# Patient Record
Sex: MALE | Race: WHITE | Employment: OTHER | URBAN - METROPOLITAN AREA
[De-identification: names, ages, dates, MRNs, and addresses within clinical notes are randomized per-mention and may not be internally consistent; named-entity substitution may affect disease eponyms.]

---

## 2017-10-29 ENCOUNTER — APPOINTMENT (OUTPATIENT)
Dept: CT IMAGING | Age: 47
DRG: 683 | End: 2017-10-29
Payer: COMMERCIAL

## 2017-10-29 ENCOUNTER — APPOINTMENT (OUTPATIENT)
Dept: GENERAL RADIOLOGY | Age: 47
DRG: 683 | End: 2017-10-29
Payer: COMMERCIAL

## 2017-10-29 ENCOUNTER — HOSPITAL ENCOUNTER (INPATIENT)
Age: 47
LOS: 5 days | Discharge: HOME | DRG: 683 | End: 2017-11-03
Attending: EMERGENCY MEDICINE | Admitting: INTERNAL MEDICINE
Payer: COMMERCIAL

## 2017-10-29 DIAGNOSIS — D50.9 IRON DEFICIENCY ANEMIA, UNSPECIFIED IRON DEFICIENCY ANEMIA TYPE: ICD-10-CM

## 2017-10-29 DIAGNOSIS — I10 ACCELERATED HYPERTENSION: Primary | ICD-10-CM

## 2017-10-29 DIAGNOSIS — N17.9 ACUTE RENAL FAILURE, UNSPECIFIED ACUTE RENAL FAILURE TYPE (HCC): ICD-10-CM

## 2017-10-29 PROBLEM — E66.9 OBESITY (BMI 35.0-39.9 WITHOUT COMORBIDITY): Status: ACTIVE | Noted: 2017-10-29

## 2017-10-29 PROBLEM — R73.9 HYPERGLYCEMIA: Status: ACTIVE | Noted: 2017-10-29

## 2017-10-29 PROBLEM — E87.6 HYPOKALEMIA: Status: ACTIVE | Noted: 2017-10-29

## 2017-10-29 PROBLEM — I16.0 HYPERTENSIVE URGENCY: Status: ACTIVE | Noted: 2017-10-29

## 2017-10-29 PROBLEM — G47.33 OSA (OBSTRUCTIVE SLEEP APNEA): Status: ACTIVE | Noted: 2017-10-29

## 2017-10-29 LAB
-: ABNORMAL
ABSOLUTE EOS #: 0.2 K/UL (ref 0–0.4)
ABSOLUTE IMMATURE GRANULOCYTE: ABNORMAL K/UL (ref 0–0.3)
ABSOLUTE LYMPH #: 1.5 K/UL (ref 1–4.8)
ABSOLUTE MONO #: 0.8 K/UL (ref 0.2–0.8)
AMORPHOUS: ABNORMAL
ANION GAP SERPL CALCULATED.3IONS-SCNC: 13 MMOL/L (ref 9–17)
ANION GAP SERPL CALCULATED.3IONS-SCNC: 15 MMOL/L (ref 9–17)
BACTERIA: ABNORMAL
BASOPHILS # BLD: 0 %
BASOPHILS ABSOLUTE: 0 K/UL (ref 0–0.2)
BILIRUBIN URINE: NEGATIVE
BUN BLDV-MCNC: 34 MG/DL (ref 6–20)
BUN BLDV-MCNC: 39 MG/DL (ref 6–20)
BUN/CREAT BLD: 18 (ref 9–20)
BUN/CREAT BLD: 19 (ref 9–20)
CALCIUM SERPL-MCNC: 8.3 MG/DL (ref 8.6–10.4)
CALCIUM SERPL-MCNC: 8.3 MG/DL (ref 8.6–10.4)
CASTS UA: ABNORMAL /LPF
CHLORIDE BLD-SCNC: 96 MMOL/L (ref 98–107)
CHLORIDE BLD-SCNC: 98 MMOL/L (ref 98–107)
CO2: 26 MMOL/L (ref 20–31)
CO2: 27 MMOL/L (ref 20–31)
COLOR: YELLOW
COMMENT UA: ABNORMAL
CREAT SERPL-MCNC: 1.87 MG/DL (ref 0.7–1.2)
CREAT SERPL-MCNC: 2.07 MG/DL (ref 0.7–1.2)
CRYSTALS, UA: ABNORMAL /HPF
D-DIMER QUANTITATIVE: 0.69 MG/L FEU
DIFFERENTIAL TYPE: ABNORMAL
EOSINOPHILS RELATIVE PERCENT: 2 %
EPITHELIAL CELLS UA: ABNORMAL /HPF
GFR AFRICAN AMERICAN: 42 ML/MIN
GFR AFRICAN AMERICAN: 48 ML/MIN
GFR NON-AFRICAN AMERICAN: 35 ML/MIN
GFR NON-AFRICAN AMERICAN: 39 ML/MIN
GFR SERPL CREATININE-BSD FRML MDRD: ABNORMAL ML/MIN/{1.73_M2}
GLUCOSE BLD-MCNC: 138 MG/DL (ref 70–99)
GLUCOSE BLD-MCNC: 152 MG/DL (ref 70–99)
GLUCOSE BLD-MCNC: 169 MG/DL (ref 75–110)
GLUCOSE URINE: NEGATIVE
HCT VFR BLD CALC: 31.5 % (ref 41–53)
HEMOGLOBIN: 10.2 G/DL (ref 13.5–17.5)
IMMATURE GRANULOCYTES: ABNORMAL %
KETONES, URINE: NEGATIVE
LEUKOCYTE ESTERASE, URINE: NEGATIVE
LYMPHOCYTES # BLD: 12 %
MCH RBC QN AUTO: 27.4 PG (ref 26–34)
MCHC RBC AUTO-ENTMCNC: 32.5 G/DL (ref 31–37)
MCV RBC AUTO: 84.3 FL (ref 80–100)
MONOCYTES # BLD: 6 %
MUCUS: ABNORMAL
MYOGLOBIN: 239 NG/ML (ref 28–72)
NITRITE, URINE: NEGATIVE
OSMOLALITY URINE: 487 MOSM/KG (ref 300–1170)
OTHER OBSERVATIONS UA: ABNORMAL
PDW BLD-RTO: 17.2 % (ref 11.5–14.5)
PH UA: 5.5 (ref 5–8)
PLATELET # BLD: 236 K/UL (ref 130–400)
PLATELET ESTIMATE: ABNORMAL
PMV BLD AUTO: 7.8 FL (ref 6–12)
POTASSIUM SERPL-SCNC: 2.7 MMOL/L (ref 3.7–5.3)
POTASSIUM SERPL-SCNC: 2.9 MMOL/L (ref 3.7–5.3)
PROTEIN UA: ABNORMAL
RBC # BLD: 3.73 M/UL (ref 4.5–5.9)
RBC # BLD: ABNORMAL 10*6/UL
RBC UA: ABNORMAL /HPF (ref 0–2)
RENAL EPITHELIAL, UA: ABNORMAL /HPF
SEG NEUTROPHILS: 80 %
SEGMENTED NEUTROPHILS ABSOLUTE COUNT: 10.6 K/UL (ref 1.8–7.7)
SODIUM BLD-SCNC: 137 MMOL/L (ref 135–144)
SODIUM BLD-SCNC: 138 MMOL/L (ref 135–144)
SPECIFIC GRAVITY UA: 1.02 (ref 1–1.03)
TRICHOMONAS: ABNORMAL
TROPONIN INTERP: ABNORMAL
TROPONIN T: 0.06 NG/ML
TURBIDITY: CLEAR
URINE HGB: ABNORMAL
UROBILINOGEN, URINE: NORMAL
WBC # BLD: 13.2 K/UL (ref 3.5–11)
WBC # BLD: ABNORMAL 10*3/UL
WBC UA: ABNORMAL /HPF (ref 0–5)
YEAST: ABNORMAL

## 2017-10-29 PROCEDURE — 83935 ASSAY OF URINE OSMOLALITY: CPT

## 2017-10-29 PROCEDURE — 86335 IMMUNFIX E-PHORSIS/URINE/CSF: CPT

## 2017-10-29 PROCEDURE — 84300 ASSAY OF URINE SODIUM: CPT

## 2017-10-29 PROCEDURE — 83036 HEMOGLOBIN GLYCOSYLATED A1C: CPT

## 2017-10-29 PROCEDURE — 6360000002 HC RX W HCPCS: Performed by: EMERGENCY MEDICINE

## 2017-10-29 PROCEDURE — 84156 ASSAY OF PROTEIN URINE: CPT

## 2017-10-29 PROCEDURE — 82088 ASSAY OF ALDOSTERONE: CPT

## 2017-10-29 PROCEDURE — 36415 COLL VENOUS BLD VENIPUNCTURE: CPT

## 2017-10-29 PROCEDURE — 80048 BASIC METABOLIC PNL TOTAL CA: CPT

## 2017-10-29 PROCEDURE — 84244 ASSAY OF RENIN: CPT

## 2017-10-29 PROCEDURE — 6370000000 HC RX 637 (ALT 250 FOR IP): Performed by: EMERGENCY MEDICINE

## 2017-10-29 PROCEDURE — 99285 EMERGENCY DEPT VISIT HI MDM: CPT

## 2017-10-29 PROCEDURE — 6370000000 HC RX 637 (ALT 250 FOR IP): Performed by: INTERNAL MEDICINE

## 2017-10-29 PROCEDURE — 70450 CT HEAD/BRAIN W/O DYE: CPT

## 2017-10-29 PROCEDURE — 87205 SMEAR GRAM STAIN: CPT

## 2017-10-29 PROCEDURE — 82947 ASSAY GLUCOSE BLOOD QUANT: CPT

## 2017-10-29 PROCEDURE — 84166 PROTEIN E-PHORESIS/URINE/CSF: CPT

## 2017-10-29 PROCEDURE — 2500000003 HC RX 250 WO HCPCS: Performed by: EMERGENCY MEDICINE

## 2017-10-29 PROCEDURE — 6360000002 HC RX W HCPCS: Performed by: INTERNAL MEDICINE

## 2017-10-29 PROCEDURE — 6370000000 HC RX 637 (ALT 250 FOR IP): Performed by: SPECIALIST

## 2017-10-29 PROCEDURE — 96374 THER/PROPH/DIAG INJ IV PUSH: CPT

## 2017-10-29 PROCEDURE — 96375 TX/PRO/DX INJ NEW DRUG ADDON: CPT

## 2017-10-29 PROCEDURE — 83874 ASSAY OF MYOGLOBIN: CPT

## 2017-10-29 PROCEDURE — 84484 ASSAY OF TROPONIN QUANT: CPT

## 2017-10-29 PROCEDURE — 85025 COMPLETE CBC W/AUTO DIFF WBC: CPT

## 2017-10-29 PROCEDURE — 81001 URINALYSIS AUTO W/SCOPE: CPT

## 2017-10-29 PROCEDURE — 85379 FIBRIN DEGRADATION QUANT: CPT

## 2017-10-29 PROCEDURE — 93005 ELECTROCARDIOGRAM TRACING: CPT

## 2017-10-29 PROCEDURE — 82570 ASSAY OF URINE CREATININE: CPT

## 2017-10-29 PROCEDURE — 2000000000 HC ICU R&B

## 2017-10-29 PROCEDURE — 2580000003 HC RX 258: Performed by: INTERNAL MEDICINE

## 2017-10-29 PROCEDURE — 71020 XR CHEST STANDARD TWO VW: CPT

## 2017-10-29 RX ORDER — LABETALOL HYDROCHLORIDE 5 MG/ML
10 INJECTION, SOLUTION INTRAVENOUS ONCE
Status: COMPLETED | OUTPATIENT
Start: 2017-10-29 | End: 2017-10-29

## 2017-10-29 RX ORDER — AMLODIPINE BESYLATE 5 MG/1
5 TABLET ORAL DAILY
Status: DISCONTINUED | OUTPATIENT
Start: 2017-10-29 | End: 2017-10-30

## 2017-10-29 RX ORDER — POTASSIUM BICARBONATE 25 MEQ/1
50 TABLET, EFFERVESCENT ORAL ONCE
Status: COMPLETED | OUTPATIENT
Start: 2017-10-29 | End: 2017-10-29

## 2017-10-29 RX ORDER — ACETAMINOPHEN 325 MG/1
650 TABLET ORAL EVERY 4 HOURS PRN
Status: DISCONTINUED | OUTPATIENT
Start: 2017-10-29 | End: 2017-11-03 | Stop reason: HOSPADM

## 2017-10-29 RX ORDER — HYDRALAZINE HYDROCHLORIDE 20 MG/ML
10 INJECTION INTRAMUSCULAR; INTRAVENOUS ONCE
Status: COMPLETED | OUTPATIENT
Start: 2017-10-29 | End: 2017-10-29

## 2017-10-29 RX ORDER — LIDOCAINE HYDROCHLORIDE AND EPINEPHRINE 10; 10 MG/ML; UG/ML
20 INJECTION, SOLUTION INFILTRATION; PERINEURAL ONCE
Status: COMPLETED | OUTPATIENT
Start: 2017-10-29 | End: 2017-10-29

## 2017-10-29 RX ORDER — SODIUM CHLORIDE 0.9 % (FLUSH) 0.9 %
10 SYRINGE (ML) INJECTION PRN
Status: DISCONTINUED | OUTPATIENT
Start: 2017-10-29 | End: 2017-11-03 | Stop reason: HOSPADM

## 2017-10-29 RX ORDER — SODIUM CHLORIDE 0.9 % (FLUSH) 0.9 %
10 SYRINGE (ML) INJECTION EVERY 12 HOURS SCHEDULED
Status: DISCONTINUED | OUTPATIENT
Start: 2017-10-29 | End: 2017-11-03 | Stop reason: HOSPADM

## 2017-10-29 RX ORDER — HEPARIN SODIUM 5000 [USP'U]/ML
5000 INJECTION, SOLUTION INTRAVENOUS; SUBCUTANEOUS EVERY 8 HOURS SCHEDULED
Status: DISCONTINUED | OUTPATIENT
Start: 2017-10-30 | End: 2017-11-03 | Stop reason: HOSPADM

## 2017-10-29 RX ORDER — MORPHINE SULFATE 2 MG/ML
1 INJECTION, SOLUTION INTRAMUSCULAR; INTRAVENOUS EVERY 4 HOURS PRN
Status: DISCONTINUED | OUTPATIENT
Start: 2017-10-29 | End: 2017-11-02

## 2017-10-29 RX ORDER — DEXTROSE MONOHYDRATE 50 MG/ML
100 INJECTION, SOLUTION INTRAVENOUS PRN
Status: DISCONTINUED | OUTPATIENT
Start: 2017-10-29 | End: 2017-11-03 | Stop reason: HOSPADM

## 2017-10-29 RX ORDER — POTASSIUM CHLORIDE 20 MEQ/1
40 TABLET, EXTENDED RELEASE ORAL ONCE
Status: COMPLETED | OUTPATIENT
Start: 2017-10-29 | End: 2017-10-29

## 2017-10-29 RX ORDER — DEXTROSE MONOHYDRATE 25 G/50ML
12.5 INJECTION, SOLUTION INTRAVENOUS PRN
Status: DISCONTINUED | OUTPATIENT
Start: 2017-10-29 | End: 2017-11-03 | Stop reason: HOSPADM

## 2017-10-29 RX ORDER — NICOTINE POLACRILEX 4 MG
15 LOZENGE BUCCAL PRN
Status: DISCONTINUED | OUTPATIENT
Start: 2017-10-29 | End: 2017-11-03 | Stop reason: HOSPADM

## 2017-10-29 RX ORDER — ONDANSETRON 2 MG/ML
4 INJECTION INTRAMUSCULAR; INTRAVENOUS EVERY 6 HOURS PRN
Status: DISCONTINUED | OUTPATIENT
Start: 2017-10-29 | End: 2017-11-03 | Stop reason: HOSPADM

## 2017-10-29 RX ORDER — SPIRONOLACTONE 25 MG/1
50 TABLET ORAL ONCE
Status: COMPLETED | OUTPATIENT
Start: 2017-10-29 | End: 2017-10-29

## 2017-10-29 RX ORDER — SODIUM CHLORIDE 450 MG/100ML
INJECTION, SOLUTION INTRAVENOUS CONTINUOUS
Status: DISCONTINUED | OUTPATIENT
Start: 2017-10-29 | End: 2017-10-29

## 2017-10-29 RX ADMIN — Medication: at 11:21

## 2017-10-29 RX ADMIN — SODIUM CHLORIDE: 4.5 INJECTION, SOLUTION INTRAVENOUS at 16:02

## 2017-10-29 RX ADMIN — LABETALOL HYDROCHLORIDE 10 MG: 5 INJECTION, SOLUTION INTRAVENOUS at 11:13

## 2017-10-29 RX ADMIN — MORPHINE SULFATE 1 MG: 2 INJECTION, SOLUTION INTRAMUSCULAR; INTRAVENOUS at 22:24

## 2017-10-29 RX ADMIN — HEPARIN SODIUM 5000 UNITS: 5000 INJECTION, SOLUTION INTRAVENOUS; SUBCUTANEOUS at 23:58

## 2017-10-29 RX ADMIN — NICARDIPINE HYDROCHLORIDE 5 MG/HR: 0.2 INJECTION, SOLUTION INTRAVENOUS at 13:51

## 2017-10-29 RX ADMIN — ACETAMINOPHEN 650 MG: 325 TABLET ORAL at 21:21

## 2017-10-29 RX ADMIN — AMLODIPINE BESYLATE 5 MG: 5 TABLET ORAL at 19:42

## 2017-10-29 RX ADMIN — SPIRONOLACTONE 50 MG: 25 TABLET, FILM COATED ORAL at 16:02

## 2017-10-29 RX ADMIN — METOPROLOL TARTRATE 50 MG: 25 TABLET ORAL at 12:11

## 2017-10-29 RX ADMIN — POTASSIUM BICARBONATE 50 MEQ: 25 TABLET, EFFERVESCENT ORAL at 12:12

## 2017-10-29 RX ADMIN — INSULIN LISPRO 1 UNITS: 100 INJECTION, SOLUTION INTRAVENOUS; SUBCUTANEOUS at 21:08

## 2017-10-29 RX ADMIN — LIDOCAINE HYDROCHLORIDE,EPINEPHRINE BITARTRATE 20 ML: 10; .01 INJECTION, SOLUTION INFILTRATION; PERINEURAL at 11:13

## 2017-10-29 RX ADMIN — NICARDIPINE HYDROCHLORIDE 10 MG/HR: 0.2 INJECTION, SOLUTION INTRAVENOUS at 23:58

## 2017-10-29 RX ADMIN — HYDRALAZINE HYDROCHLORIDE 10 MG: 20 INJECTION INTRAMUSCULAR; INTRAVENOUS at 11:12

## 2017-10-29 RX ADMIN — POTASSIUM CHLORIDE 40 MEQ: 20 TABLET, EXTENDED RELEASE ORAL at 19:42

## 2017-10-29 RX ADMIN — POTASSIUM CHLORIDE 40 MEQ: 20 TABLET, EXTENDED RELEASE ORAL at 16:02

## 2017-10-29 RX ADMIN — NICARDIPINE HYDROCHLORIDE 5 MG/HR: 0.2 INJECTION, SOLUTION INTRAVENOUS at 19:42

## 2017-10-29 ASSESSMENT — PAIN SCALES - GENERAL
PAINLEVEL_OUTOF10: 0
PAINLEVEL_OUTOF10: 3
PAINLEVEL_OUTOF10: 0
PAINLEVEL_OUTOF10: 0

## 2017-10-29 NOTE — ED NOTES
Pt presents to er via ems with c/o bleeding to left lower leg. Pt states he was picking at a sore at 0100 this morning. Pt states he has been bleeding since. Pt was seen at urgent care earlier this morning. Pt arrives to ed with pressure dressing applied. Pt states he is non-compliant with medications. Pt has been off of blood pressure meds for last 2-3 years. Pt states he gets headaches often. Pt a&ox3. Skin warm and dry. Respirations even and non-labored.       Alrine Merrill RN  10/29/17 1190

## 2017-10-29 NOTE — CONSULTS
Admission medications    Not on File       Current Medications:   Scheduled Meds:   sodium chloride flush  10 mL Intravenous 2 times per day     Continuous Infusions:   niCARdipine in NaCl 5 mg/hr (10/29/17 1530)    sodium chloride 100 mL/hr at 10/29/17 1602     PRN Meds:sodium chloride flush, acetaminophen    Allergies:   No Known Allergies    Social History:     Social History     Social History    Marital status:      Spouse name: N/A    Number of children: N/A    Years of education: N/A     Occupational History    Not on file. Social History Main Topics    Smoking status: Never Smoker    Smokeless tobacco: Never Used    Alcohol use Yes    Drug use: No    Sexual activity: Not on file     Other Topics Concern    Not on file     Social History Narrative    No narrative on file       Family History:   History reviewed. No pertinent family history. Physical Exam:     Vitals:    10/29/17 1715 10/29/17 1730 10/29/17 1800 10/29/17 1815   BP: (!) 149/95 (!) 157/78 (!) 175/76 (!) 147/80   Pulse: 82 80 81 81   Resp: 17 14 17 19   Temp:       TempSrc:       SpO2: 96% 96% 96% 98%   Weight:       Height:         No intake/output data recorded. Patient Vitals for the past 96 hrs (Last 3 readings):   Weight   10/29/17 1106 300 lb (136.1 kg)       PHYSICAL EXAM:  General:  Awake, alert, not in distress. Appears to be stated age. HEENT: Atraumatic, normocephalic. Anicteric sclera. Pink and moist oral mucosa. Neck supple. No carotid bruit. No JVD. Chest: Bilateral air entry, clear to auscultation, no wheezing, rhonchi or rales. Cardiovascular: RRR, S1S2, no murmur, rub or gallop. No lower extremity edema. Abdomen: Soft, non tender to palpation. Active bowel sounds x 4 quadrants. Musculoskeletal: Active ROM x 4 extremities. No cyanosis or clubbing. Integumentary: Pink, warm and dry. Free from rash or lesions. Skin turgor normal. BL vericose vains noted.   CNS: Oriented to person, place and time. Speech clear. Face symmetrical. No tremor. Has trouble recalling events    Data:     CBC:   Recent Labs      10/29/17   1115   WBC  13.2*   HGB  10.2*   HCT  31.5*   MCV  84.3   PLT  236     BMP:  Recent Labs      10/29/17   1115   NA  137   K  2.7*   CL  96*   CO2  26   BUN  39*   CREATININE  2.07*   GLUCOSE  138*         Assessment:     1. Acute Kidney Injury likely hemodynamic vs CKD from Chronic uncontrolled HTN  2. Hypokalemia  3. Hyperaldosteronism? 4. Obesity    Plan:     1. Check serum jeevan and Plasma renin activity  2. We will check Renal Ultrasound to r/o element of obstruction and to assess the kidney size/echotexture. 3. Comprehensive urine testing including Urinalysis, Urine sodium, Creatinine (FENa) and Eosinophils. Urine protein and creatinine to quantify the proteinuria if any at all. 4. Start Norvasc and aldactone po. K supplements. Monitor GFR, elytes. 5. We will order serum and urine protein Immunofixation to r/o element of occult paraprotein disease. 6 .Avoid nephrotoxic drugs and IV contrast exposure. 7. Wean Cardene as tolerated  See orders    Thank you for the consultation. Please do not hesitate to contact us for any further questions/concerns. We will continue to follow along with you.      Electronically signed by Alison Daniels MD on 10/29/2017 at 6:28 PM

## 2017-10-29 NOTE — ED NOTES
Bed: 25  Expected date: 10/29/17  Expected time: 10:48 AM  Means of arrival: [facility]  Comments:  [facility]     Bre Ley  10/29/17 110

## 2017-10-29 NOTE — ED PROVIDER NOTES
reviewed. No pertinent family history. SOCIAL HISTORY       Social History     Social History    Marital status:      Spouse name: N/A    Number of children: N/A    Years of education: N/A     Social History Main Topics    Smoking status: Never Smoker    Smokeless tobacco: Never Used    Alcohol use Yes    Drug use: No    Sexual activity: Not Asked     Other Topics Concern    None     Social History Narrative    None       SCREENINGS             PHYSICAL EXAM    (up to 7 for level 4, 8 or more for level 5)     ED Triage Vitals [10/29/17 1106]   BP Temp Temp Source Pulse Resp SpO2 Height Weight   (!) 238/138 98.3 °F (36.8 °C) Oral 85 20 96 % 6' 1\" (1.854 m) 300 lb (136.1 kg)       Physical Exam   Constitutional:   Morbidly obese in appearance, nondistressed. HENT:   Head: Normocephalic. Right Ear: External ear normal.   Left Ear: External ear normal.   Nose: Nose normal.   Mouth/Throat: Oropharynx is clear and moist.   Eyes: EOM are normal. Pupils are equal, round, and reactive to light. Neck: Neck supple. Cardiovascular: Normal rate and regular rhythm. Exam reveals distant heart sounds (due to body habitus. ). No murmur heard. Pulmonary/Chest: Effort normal. No accessory muscle usage. No respiratory distress. He has decreased breath sounds (due to body habitus. ). He has no rhonchi. He has no rales. Abdominal: Soft. He exhibits no distension. There is no tenderness. Unable to assess for masses due to protruberant abdomen. Musculoskeletal: Normal range of motion. He exhibits no tenderness. Edema: 1+ pitting pretibial.   Patient has superficial varicose veins in both distal lower extremities. There is a small open area over the anterior tibial border distally in the left lower leg from where he starts bleeding 1 cm attempt is made to cauterize it. Skin: Skin is warm and dry. No pallor. Psychiatric: He has a normal mood and affect. His speech is tangential. He is slowed.  He ( Topical Given 10/29/17 1121)   lidocaine-EPINEPHrine 1 percent-1:[id number] injection 20 mL (20 mLs Intradermal Given 10/29/17 1113)   labetalol (NORMODYNE;TRANDATE) injection 10 mg (10 mg Intravenous Given 10/29/17 1113)   hydrALAZINE (APRESOLINE) injection 10 mg (10 mg Intravenous Given 10/29/17 1112)   potassium bicarbonate (K-LYTE) disintegrating tablet 50 mEq (50 mEq Oral Given 10/29/17 1212)   metoprolol tartrate (LOPRESSOR) tablet 50 mg (50 mg Oral Given 10/29/17 1211)       New Prescriptions from this visit:  There are no discharge medications for this patient. Follow-up:  No follow-up provider specified. Final Impression:   1. Accelerated hypertension    2.  Acute renal failure, unspecified acute renal failure type ([facility] [address]               (Please note that portions of this note were completed with a voice recognition program.  Efforts were made to edit the dictations but occasionally words are mis-transcribed.)    Acacia Barger MD (electronically signed)  Attending Emergency Physician            Acacia Barger MD  10/29/17 308 [facility] MD Gabriella  10/29/17 8025

## 2017-10-29 NOTE — H&P
conjunctivae normal and sclera anicteric    Neck: neck supple and non tender without mass   Pulmonary/Chest: clear to auscultation bilaterally- no wheezes, rales or rhonchi, normal air movement, no respiratory distress  Cardiovascular: normal rate, regular rhythm, normal S1 and S2, no gallops, intact distal pulses and no carotid bruits  Abdomen: soft, non-tender, non-distended, normal bowel sounds, no masses or organomegaly  Extremities: no edema and good pulses varicose veins  Neurologic: Alert and oriented ×3 with no focal deficits  Slow To react    Vitals:  BP (!) 145/71   Pulse 81   Temp 97.9 °F (36.6 °C) (Oral)   Resp 16   Ht 6' 1\" (1.854 m)   Wt 300 lb (136.1 kg)   SpO2 97%   BMI 39.58 kg/m²     LABS:  CBC:   Lab Results   Component Value Date    WBC 13.2 10/29/2017    RBC 3.73 10/29/2017    HGB 10.2 10/29/2017    HCT 31.5 10/29/2017    MCV 84.3 10/29/2017    MCH 27.4 10/29/2017    MCHC 32.5 10/29/2017    RDW 17.2 10/29/2017     10/29/2017    MPV 7.8 10/29/2017     BMP:    Lab Results   Component Value Date     10/29/2017    K 2.9 10/29/2017    CL 98 10/29/2017    CO2 27 10/29/2017    BUN 34 10/29/2017    CREATININE 1.87 10/29/2017    CALCIUM 8.3 10/29/2017    GFRAA 48 10/29/2017    LABGLOM 39 10/29/2017    GLUCOSE 152 10/29/2017         ASSESSMENT:    Hypertensive urgency  Acute kidney injury  Hypokalemia  Hyperglycemia  PRIMO  Obesity BMI 39.3  Varicose veins legs  Patient Active Problem List   Diagnosis    Hypertensive urgency    [facility]ONNA [facility]acute kidney injury) ([facility] [address]    Hypokalemia    Hyperglycemia    PRIMO (obstructive sleep apnea)       PLAN:    Admit to ICU on IV Cardene drip, start oral medications K supplements checked before meals and at bedtime Accu-Cheks with insulin coverage as needed check HbA1c DVT prophylaxis overnight pulse oximetry will have nephrology vascular and pulmonary see the patient see orders              Anya Leal MD  7:24 PM  10/29/2017

## 2017-10-30 ENCOUNTER — APPOINTMENT (OUTPATIENT)
Dept: ULTRASOUND IMAGING | Age: 47
DRG: 683 | End: 2017-10-30
Payer: COMMERCIAL

## 2017-10-30 LAB
ABSOLUTE EOS #: 0.2 K/UL (ref 0–0.4)
ABSOLUTE IMMATURE GRANULOCYTE: ABNORMAL K/UL (ref 0–0.3)
ABSOLUTE LYMPH #: 1.9 K/UL (ref 1–4.8)
ABSOLUTE MONO #: 1 K/UL (ref 0.2–0.8)
ALBUMIN (CALCULATED): 3.8 G/DL (ref 3.2–5.2)
ALBUMIN PERCENT: 63 % (ref 45–65)
ALPHA 1 PERCENT: 3 % (ref 3–6)
ALPHA 2 PERCENT: 9 % (ref 6–13)
ALPHA-1-GLOBULIN: 0.2 G/DL (ref 0.1–0.4)
ALPHA-2-GLOBULIN: 0.5 G/DL (ref 0.5–0.9)
ANION GAP SERPL CALCULATED.3IONS-SCNC: 13 MMOL/L (ref 9–17)
BASOPHILS # BLD: 0 %
BASOPHILS ABSOLUTE: 0 K/UL (ref 0–0.2)
BETA GLOBULIN: 0.6 G/DL (ref 0.5–1.1)
BETA PERCENT: 11 % (ref 11–19)
BUN BLDV-MCNC: 29 MG/DL (ref 6–20)
BUN/CREAT BLD: 16 (ref 9–20)
CALCIUM IONIZED: 1.13 MMOL/L (ref 1.13–1.33)
CALCIUM SERPL-MCNC: 8.5 MG/DL (ref 8.6–10.4)
CHLORIDE BLD-SCNC: 99 MMOL/L (ref 98–107)
CO2: 27 MMOL/L (ref 20–31)
CREAT SERPL-MCNC: 1.81 MG/DL (ref 0.7–1.2)
CREATININE URINE: 92.9 MG/DL (ref 39–259)
DIFFERENTIAL TYPE: ABNORMAL
EKG ATRIAL RATE: 80 BPM
EKG P AXIS: 46 DEGREES
EKG P-R INTERVAL: 192 MS
EKG Q-T INTERVAL: 450 MS
EKG QRS DURATION: 124 MS
EKG QTC CALCULATION (BAZETT): 519 MS
EKG R AXIS: -42 DEGREES
EKG T AXIS: 118 DEGREES
EKG VENTRICULAR RATE: 80 BPM
EOSINOPHIL,URINE: NORMAL
EOSINOPHILS RELATIVE PERCENT: 1 %
ESTIMATED AVERAGE GLUCOSE: 128 MG/DL
FERRITIN: 58 UG/L (ref 30–400)
GAMMA GLOBULIN %: 15 % (ref 9–20)
GAMMA GLOBULIN: 0.9 G/DL (ref 0.5–1.5)
GFR AFRICAN AMERICAN: 49 ML/MIN
GFR NON-AFRICAN AMERICAN: 41 ML/MIN
GFR SERPL CREATININE-BSD FRML MDRD: ABNORMAL ML/MIN/{1.73_M2}
GFR SERPL CREATININE-BSD FRML MDRD: ABNORMAL ML/MIN/{1.73_M2}
GLUCOSE BLD-MCNC: 132 MG/DL (ref 70–99)
GLUCOSE BLD-MCNC: 138 MG/DL (ref 75–110)
GLUCOSE BLD-MCNC: 140 MG/DL (ref 75–110)
GLUCOSE BLD-MCNC: 170 MG/DL (ref 75–110)
HBA1C MFR BLD: 6.1 % (ref 4–6)
HCT VFR BLD CALC: 30.6 % (ref 41–53)
HEMOGLOBIN: 9.9 G/DL (ref 13.5–17.5)
IMMATURE GRANULOCYTES: ABNORMAL %
IRON: 17 UG/DL (ref 59–158)
LYMPHOCYTES # BLD: 14 %
MAGNESIUM: 2.1 MG/DL (ref 1.6–2.6)
MCH RBC QN AUTO: 27.6 PG (ref 26–34)
MCHC RBC AUTO-ENTMCNC: 32.3 G/DL (ref 31–37)
MCV RBC AUTO: 85.5 FL (ref 80–100)
MONOCYTES # BLD: 7 %
P E INTERPRETATION, U: NORMAL
PATHOLOGIST: ABNORMAL
PATHOLOGIST: NORMAL
PATHOLOGIST: NORMAL
PDW BLD-RTO: 16.8 % (ref 11.5–14.5)
PHOSPHORUS: 3.1 MG/DL (ref 2.5–4.5)
PLATELET # BLD: 255 K/UL (ref 130–400)
PLATELET ESTIMATE: ABNORMAL
PMV BLD AUTO: 7.8 FL (ref 6–12)
POTASSIUM SERPL-SCNC: 2.8 MMOL/L (ref 3.7–5.3)
PROTEIN ELECTROPHORESIS, SERUM: ABNORMAL
RBC # BLD: 3.58 M/UL (ref 4.5–5.9)
RBC # BLD: ABNORMAL 10*6/UL
SEG NEUTROPHILS: 78 %
SEGMENTED NEUTROPHILS ABSOLUTE COUNT: 10.5 K/UL (ref 1.8–7.7)
SERUM IFX INTERP: NORMAL
SODIUM BLD-SCNC: 139 MMOL/L (ref 135–144)
SODIUM,UR: 32 MMOL/L
SPECIMEN TYPE: NORMAL
TOTAL PROT. SUM,%: 101 % (ref 98–102)
TOTAL PROT. SUM: 6 G/DL (ref 6.3–8.2)
TOTAL PROTEIN: 6 G/DL (ref 6.4–8.3)
URINE IFX INTERP: NORMAL
URINE IFX SPECIMEN: NORMAL
URINE TOTAL PROTEIN: 28 MG/DL
URINE TOTAL PROTEIN: 28 MG/DL
VOLUME: NORMAL ML
WBC # BLD: 13.5 K/UL (ref 3.5–11)
WBC # BLD: ABNORMAL 10*3/UL

## 2017-10-30 PROCEDURE — 2580000003 HC RX 258: Performed by: INTERNAL MEDICINE

## 2017-10-30 PROCEDURE — 76770 US EXAM ABDO BACK WALL COMP: CPT

## 2017-10-30 PROCEDURE — 93970 EXTREMITY STUDY: CPT

## 2017-10-30 PROCEDURE — 6360000002 HC RX W HCPCS: Performed by: INTERNAL MEDICINE

## 2017-10-30 PROCEDURE — 82947 ASSAY GLUCOSE BLOOD QUANT: CPT

## 2017-10-30 PROCEDURE — 84165 PROTEIN E-PHORESIS SERUM: CPT

## 2017-10-30 PROCEDURE — 2500000003 HC RX 250 WO HCPCS: Performed by: INTERNAL MEDICINE

## 2017-10-30 PROCEDURE — 6370000000 HC RX 637 (ALT 250 FOR IP): Performed by: INTERNAL MEDICINE

## 2017-10-30 PROCEDURE — 83540 ASSAY OF IRON: CPT

## 2017-10-30 PROCEDURE — 93306 TTE W/DOPPLER COMPLETE: CPT

## 2017-10-30 PROCEDURE — 82746 ASSAY OF FOLIC ACID SERUM: CPT

## 2017-10-30 PROCEDURE — 6370000000 HC RX 637 (ALT 250 FOR IP): Performed by: SPECIALIST

## 2017-10-30 PROCEDURE — 80048 BASIC METABOLIC PNL TOTAL CA: CPT

## 2017-10-30 PROCEDURE — 83735 ASSAY OF MAGNESIUM: CPT

## 2017-10-30 PROCEDURE — 85025 COMPLETE CBC W/AUTO DIFF WBC: CPT

## 2017-10-30 PROCEDURE — 82330 ASSAY OF CALCIUM: CPT

## 2017-10-30 PROCEDURE — 82728 ASSAY OF FERRITIN: CPT

## 2017-10-30 PROCEDURE — 82607 VITAMIN B-12: CPT

## 2017-10-30 PROCEDURE — 36415 COLL VENOUS BLD VENIPUNCTURE: CPT

## 2017-10-30 PROCEDURE — 2500000003 HC RX 250 WO HCPCS: Performed by: EMERGENCY MEDICINE

## 2017-10-30 PROCEDURE — 86334 IMMUNOFIX E-PHORESIS SERUM: CPT

## 2017-10-30 PROCEDURE — 84100 ASSAY OF PHOSPHORUS: CPT

## 2017-10-30 PROCEDURE — 84155 ASSAY OF PROTEIN SERUM: CPT

## 2017-10-30 PROCEDURE — 6370000000 HC RX 637 (ALT 250 FOR IP): Performed by: PSYCHIATRY & NEUROLOGY

## 2017-10-30 PROCEDURE — 2000000000 HC ICU R&B

## 2017-10-30 RX ORDER — SPIRONOLACTONE 25 MG/1
50 TABLET ORAL ONCE
Status: COMPLETED | OUTPATIENT
Start: 2017-10-30 | End: 2017-10-30

## 2017-10-30 RX ORDER — SPIRONOLACTONE 25 MG/1
50 TABLET ORAL DAILY
Status: DISCONTINUED | OUTPATIENT
Start: 2017-10-30 | End: 2017-10-30

## 2017-10-30 RX ORDER — POTASSIUM CHLORIDE 20 MEQ/1
40 TABLET, EXTENDED RELEASE ORAL 3 TIMES DAILY
Status: DISCONTINUED | OUTPATIENT
Start: 2017-10-30 | End: 2017-11-02

## 2017-10-30 RX ORDER — AMLODIPINE BESYLATE 10 MG/1
10 TABLET ORAL DAILY
Status: DISCONTINUED | OUTPATIENT
Start: 2017-10-30 | End: 2017-10-30

## 2017-10-30 RX ORDER — SPIRONOLACTONE 25 MG/1
25 TABLET ORAL 2 TIMES DAILY
Status: DISCONTINUED | OUTPATIENT
Start: 2017-10-30 | End: 2017-11-01

## 2017-10-30 RX ORDER — AMLODIPINE BESYLATE 10 MG/1
10 TABLET ORAL 2 TIMES DAILY
Status: DISCONTINUED | OUTPATIENT
Start: 2017-10-30 | End: 2017-11-03 | Stop reason: HOSPADM

## 2017-10-30 RX ORDER — ARIPIPRAZOLE 5 MG/1
10 TABLET ORAL DAILY
Status: DISCONTINUED | OUTPATIENT
Start: 2017-10-30 | End: 2017-11-03 | Stop reason: HOSPADM

## 2017-10-30 RX ORDER — HYDRALAZINE HYDROCHLORIDE 50 MG/1
50 TABLET, FILM COATED ORAL EVERY 8 HOURS SCHEDULED
Status: DISCONTINUED | OUTPATIENT
Start: 2017-10-30 | End: 2017-10-31

## 2017-10-30 RX ADMIN — NICARDIPINE HYDROCHLORIDE 10 MG/HR: 0.2 INJECTION, SOLUTION INTRAVENOUS at 11:15

## 2017-10-30 RX ADMIN — INSULIN LISPRO 1 UNITS: 100 INJECTION, SOLUTION INTRAVENOUS; SUBCUTANEOUS at 12:12

## 2017-10-30 RX ADMIN — NICARDIPINE HYDROCHLORIDE 15 MG/HR: 0.2 INJECTION, SOLUTION INTRAVENOUS at 08:39

## 2017-10-30 RX ADMIN — HYDRALAZINE HYDROCHLORIDE 50 MG: 50 TABLET, FILM COATED ORAL at 16:16

## 2017-10-30 RX ADMIN — HEPARIN SODIUM 5000 UNITS: 5000 INJECTION, SOLUTION INTRAVENOUS; SUBCUTANEOUS at 16:10

## 2017-10-30 RX ADMIN — AMLODIPINE BESYLATE 10 MG: 10 TABLET ORAL at 20:33

## 2017-10-30 RX ADMIN — NICARDIPINE HYDROCHLORIDE 10 MG/HR: 0.2 INJECTION, SOLUTION INTRAVENOUS at 04:16

## 2017-10-30 RX ADMIN — POTASSIUM CHLORIDE 40 MEQ: 20 TABLET, EXTENDED RELEASE ORAL at 16:14

## 2017-10-30 RX ADMIN — AMLODIPINE BESYLATE 10 MG: 10 TABLET ORAL at 07:45

## 2017-10-30 RX ADMIN — INSULIN LISPRO 1 UNITS: 100 INJECTION, SOLUTION INTRAVENOUS; SUBCUTANEOUS at 20:36

## 2017-10-30 RX ADMIN — Medication 10 ML: at 12:08

## 2017-10-30 RX ADMIN — HYDRALAZINE HYDROCHLORIDE 50 MG: 50 TABLET, FILM COATED ORAL at 21:05

## 2017-10-30 RX ADMIN — POTASSIUM CHLORIDE 40 MEQ: 20 TABLET, EXTENDED RELEASE ORAL at 20:32

## 2017-10-30 RX ADMIN — HEPARIN SODIUM 5000 UNITS: 5000 INJECTION, SOLUTION INTRAVENOUS; SUBCUTANEOUS at 08:40

## 2017-10-30 RX ADMIN — HEPARIN SODIUM 5000 UNITS: 5000 INJECTION, SOLUTION INTRAVENOUS; SUBCUTANEOUS at 21:05

## 2017-10-30 RX ADMIN — NICARDIPINE HYDROCHLORIDE 10 MG/HR: 0.2 INJECTION, SOLUTION INTRAVENOUS at 16:10

## 2017-10-30 RX ADMIN — POTASSIUM CHLORIDE 40 MEQ: 20 TABLET, EXTENDED RELEASE ORAL at 06:27

## 2017-10-30 RX ADMIN — SPIRONOLACTONE 50 MG: 25 TABLET, FILM COATED ORAL at 06:27

## 2017-10-30 RX ADMIN — NICARDIPINE HYDROCHLORIDE 7.5 MG/HR: 0.2 INJECTION, SOLUTION INTRAVENOUS at 21:09

## 2017-10-30 RX ADMIN — MICONAZOLE NITRATE: 20.6 POWDER TOPICAL at 21:06

## 2017-10-30 RX ADMIN — ARIPIPRAZOLE 10 MG: 5 TABLET ORAL at 16:10

## 2017-10-30 RX ADMIN — SPIRONOLACTONE 25 MG: 25 TABLET, FILM COATED ORAL at 17:41

## 2017-10-30 NOTE — PROGRESS NOTES
Continuous Shazia Blanc MD 50 mL/hr at 10/30/17 1115 10 mg/hr at 10/30/17 1115    sodium chloride flush 0.9 % injection 10 mL  10 mL Intravenous 2 times per day Sosa Cordero MD   10 mL at 10/30/17 1208    sodium chloride flush 0.9 % injection 10 mL  10 mL Intravenous PRN Sosa Cordero MD        acetaminophen (TYLENOL) tablet 650 mg  650 mg Oral Q4H PRN Sosa Cordero MD   650 mg at 10/29/17 2121    glucose (GLUTOSE) 40 % oral gel 15 g  15 g Oral PRN Sosa Cordero MD        dextrose 50 % solution 12.5 g  12.5 g Intravenous PRN Sosa Cordero MD        glucagon (rDNA) injection 1 mg  1 mg Intramuscular PRN Sosa Cordero MD        dextrose 5 % solution  100 mL/hr Intravenous PRN Sosa Cordero MD        insulin lispro (HUMALOG) injection vial 0-6 Units  0-6 Units Subcutaneous TID WC Sosa Cordero MD   1 Units at 10/30/17 1212    insulin lispro (HUMALOG) injection vial 0-3 Units  0-3 Units Subcutaneous Nightly Sosa Cordero MD   1 Units at 10/29/17 2108    morphine (PF) injection 1 mg  1 mg Intravenous Q4H PRN Sosa Cordero MD   1 mg at 10/29/17 2224    heparin (porcine) injection 5,000 Units  5,000 Units Subcutaneous 3 times per day Sosa Cordero MD   5,000 Units at 10/30/17 0840    ondansetron (ZOFRAN) injection 4 mg  4 mg Intravenous Q6H PRN Sosa Cordero MD           REVIEW OF SYSTEMS     All other ROS is negative. OBJECTIVE      Vitals:    10/30/17 1030   BP: (!) 164/86   Pulse: 91   Resp: 19   Temp:    SpO2: 96%     Wt Readings from Last 3 Encounters:   10/29/17 300 lb (136.1 kg)     I/O last 3 completed shifts: In: 519 [I.V.:519]  Out: 1825 [Urine:1825]  Body mass index is 39.58 kg/m². PHYSICAL EXAM      GENERAL APPEARANCE:Awake, alert, in no acute distress  SKIN: warm and dry, no rash or erythema  EYES: conjunctivae normal and sclera anicteric  NECK:  JVD Absent. PULMONARY: Symmetrical and CTA BL. CADRDIOVASCULAR: S1 and S2 audible.    ABDOMEN: soft

## 2017-10-30 NOTE — CONSULTS
Chris Arias      Name: Lin Leon  MRN: [id number]     Acct: [de-identified]  Room: [address] [place] [address]    Admit Date: 10/29/2017  PCP: No primary care provider on file. Physician Requesting Consult:  Segun Pearl MD    Reason for Consult:  Bleeding varicose veins    Chief Complaint:     Chief Complaint   Patient presents with    Other     leg wound bleeding          History Obtained From:     patient    History of Present Illness:      Lin Leon is a  41 y.o.  male who presents with Other (leg wound bleeding )    He is admitted for hypertensive urgency and has been in the ICU for blood pressure control. He ha an episode of bleeding from his left ankle thought to be due to varicose veins. The patient himself is somnolent and a poor historian. He has multiple scabs on his legs as well as chronic venous changes. He denies any claudication and has never had a DVT. Past Medical History:     Past Medical History:   Diagnosis Date    Hypertension         Past Surgical History:     History reviewed. No pertinent surgical history. Medications Prior to Admission:       Prior to Admission medications    Not on File        Allergies:       Review of patient's allergies indicates no known allergies. Social History:     Tobacco:    reports that he has never smoked. He has never used smokeless tobacco.  Alcohol:      reports that he drinks alcohol. Drug Use:  reports that he does not use drugs. Family History:     History reviewed. No pertinent family history.     Review of Systems:     Positive and Negative as described in HPI    Constitutional:  negative for  fevers, chills, sweats, fatigue, and weight loss  HEENT:  negative for vision or hearing changes,   Respiratory:  negative for shortness of breath, cough, or congestion  Cardiovascular:  negative for  chest pain, palpitations  Gastrointestinal:  negative for nausea, vomiting, diarrhea, constipation, abdominal pain  Genitourinary:  negative for frequency, dysuria  Integument/Breast:  negative for rash, skin lesions  Musculoskeletal:  negative for muscle aches or joint pain  Neurological:  negative for headaches, dizziness, lightheadedness, numbness, pain and tingling extremities  Behavior/Psych:  negative for depression and anxiety    Code Status:  Full Code    Physical Exam:     Vitals:  BP (!) 164/86   Pulse 91   Temp 98 °F (36.7 °C) (Infrared)   Resp 19   Ht 6' 1\" (1.854 m)   Wt 300 lb (136.1 kg)   SpO2 96%   BMI 39.58 kg/m²   Temp (24hrs), Av.2 °F (36.8 °C), Min:97.9 °F (36.6 °C), Max:98.6 °F (37 °C)      General appearance - somnolent, well appearing and in no acute distress  Mental status - oriented to person, place and time with normal affect  Head - normocephalic and atraumatic  Eyes - pupils equal and reactive, extraocular eye movements intact, conjunctiva clear  Ears - hearing appears to be intact  Nose - no drainage noted  Mouth - mucous membranes moist  Neck - supple, no carotid bruits, thyroid not palpable, no JVD  Chest - clear to auscultation, normal effort  Heart - normal rate, regular rhythm, no murmurs  Abdomen - obese, soft, non-tender, non-distended, bowel sounds present all four quadrants, no masses, hepatomegaly, splenomegaly or aortic enlargement  Neurological - somnolent, does not open eyes when spoken to. no focal findings or movement disorder noted, cranial nerves II through XII grossly intact  Extremities - peripheral pulses palpable, + Hemosiderosis, small varicose veins. Multiple scabbed areas.  +pruritis when asked  Skin - no gross lesions, rashes, or induration noted        R brachial 2+ L brachial 2+   R radial 2+ L radial 2+   R femoral 2+ L femoral 2+   R popliteal 2+ L popliteal 2+   R posterior tibial 2+ L posterior tibial 2+   R dorsalis pedis 2+ L dorsalis pedis 2+         Data:     Lab Results   Component Value Date    WBC 13.5 (H) 10/30/2017    HGB 9.9 (L) 10/30/2017 HCT 30.6 (L) 10/30/2017    MCV 85.5 10/30/2017     10/30/2017     Lab Results   Component Value Date     10/30/2017    K 2.8 10/30/2017    CL 99 10/30/2017    CO2 27 10/30/2017    BUN 29 10/30/2017    CREATININE 1.81 10/30/2017    GLUCOSE 132 10/30/2017    CALCIUM 8.5 10/30/2017          Assessment:     Primary Problem  <principal problem not specified>  1. 41 yr old male with bleeding varicose veins    Active Hospital Problems    Diagnosis Date Noted    Hypertensive urgency [I16.0] 10/29/2017    JONNA (acute kidney injury) ([facility] [address] [N17.9] 10/29/2017    Hypokalemia [E87.6] 10/29/2017    Hyperglycemia [R73.9] 10/29/2017    PRIMO (obstructive sleep apnea) [G47.33] 10/29/2017    Obesity (BMI 35.0-39.9 without comorbidity) [E66.9] 10/29/2017       Plan:     1. Will obtain a venous reflux study to rule out valvular disease  2. Continue BP control. Electronically signed by Sushila Lamas MD on 10/30/2017 at 11:13 AM     Copy sent to Dr. Stanley primary care provider on file.

## 2017-10-30 NOTE — PLAN OF CARE
Problem: PAIN  Goal: Patient's pain/discomfort is manageable  Outcome: Ongoing  Pt medicated with pain medication prn. Assessed all pain characteristics including level, type, location, frequency, and onset. Non-pharmacologic interventions offered to pt as well. Pt states pain is tolerable at this time.  Will continue to monitor

## 2017-10-30 NOTE — PLAN OF CARE
Problem: Nutrition  Goal: Optimal nutrition therapy  Nutrition Problem: Altered nutrition-related lab values  Intervention: Food and/or Nutrient Delivery: Continue current diet, Start ONS  Nutritional Goals: PO intake to meet >75% of estimated kcal/protein needs, with good management of renal labs  Outcome: Ongoing

## 2017-10-30 NOTE — PROGRESS NOTES
Pharmacy Medication History Note      The patient does not currently take any prescription or OTC maintenance medications at home. No changes to the patient's home medication list were necessary. Source(s) of information: Patient    I have made the following changes to the patient's home medication list:  Other Notes · He has been off all RX medications for 2 years due to loss of insurance. Denies use of regular OTC medications         Please feel free to call me with any questions about this encounter. Thank you.     Gena Soares [facility]  Pharmacy Medication Accuracy Review Service  Phone: [phone]  Fax: [phone]    Electronically signed by Gena Soares [facility] on 10/30/2017 at 9:52 AM

## 2017-10-30 NOTE — CONSULTS
Inpatient consult to Pulmonology  Consult performed by: Mariam Metz ordered by: Opal Obrien  Assessment/Recommendations: PULMONARY, Lane Toure MD    REASON FOR CONSULTATION/CC: HYPOXEMIA AND OBSTRUCTIVE SLEEP APNEA      CONSULTING PHYSICIAN:  [facility]  PCP: No primary care provider on file. HISTORY OF PRESENT ILLNESS: Haley Rick is a 41y.o. year old male NONCOMPLIANT WITH MEDS ADMITTED WITH DYSPNEA, HYPOXEMIA AND BLEEDING FROM HIS LEGS  DROWSY AND HAS NOW IMPROVED OXYGENATION. HAS HX OF PRIMO, HAD NCPAP, NOT NOW. PAST MEDICAL HISTORY:  Past Medical History:  No date: Hypertension    PAST SURGICAL HISTORY:  History reviewed. No pertinent surgical history. FAMILY HISTORY:  family history is not on file. SOCIAL HISTORY:   reports that he has never smoked. He has never used smokeless tobacco.    OCCUPATIONAL HISTORY; denies any exposure to chemicals, coal dust, or asbestos  TRAVEL HISTORY: no recent travel history, no close contact with infectious disease      Scheduled Meds:  potassium chloride, 40 mEq, Oral, TID  ARIPiprazole, 10 mg, Oral, Daily  spironolactone, 25 mg, Oral, BID  amLODIPine, 10 mg, Oral, BID  hydrALAZINE, 50 mg, Oral, 3 times per day  sodium chloride flush, 10 mL, Intravenous, 2 times per day  insulin lispro, 0-6 Units, Subcutaneous, TID WC  insulin lispro, 0-3 Units, Subcutaneous, Nightly  heparin (porcine), 5,000 Units, Subcutaneous, 3 times per day        Continuous Infusions:  niCARdipine in NaCl Last Rate: 10 mg/hr (10/30/17 1610)  dextrose        PRN Meds:  sodium chloride flush, acetaminophen, glucose, dextrose, glucagon (rDNA), dextrose, morphine, ondansetron    ALLERGIES:  Patient has No Known Allergies.     REVIEW OF SYSTEMS:  DYSPNEA ON ANY EXERTION, LOUD SNORING  EDS, LEG SWELLING AND BLEEDING FROM SCATCHING  NO CHEST PAIN  ALL OTHER SYSTEM REVIEW NEGATIVE    Objective:  PHYSICAL EXAM:  Blood pressure (!) 186/88, pulse 92, temperature 98.8 °F (37.1 °C), temperature source Infrared, resp. rate 22, height 6' 1\" (1.854 m), weight 300 lb (136.1 kg), SpO2 97 %.'  Gen: POOR WITH LARGE NECK  Eyes: PERRL. .   ENT: No discharge. Pharynx clear. NECK IS SUPPLE  LARGE TONGUE WITH CROWDED OP  Neck: Trachea midline. No obvious mass. Neck is supple. Resp: POOR AIR BOTH BASES FEW RHONCHI  CV: Regular rate. NO S3.  GI: Non-tender. Non-distended. No hernia. Skin: Warm, dry, normal texture and turgor. No nodule on exposed extremities. Lymph: No cervical LAD. No supraclavicular LAD. M/S: No cyanosis. No clubbing. No joint deformity. 3+ BILATERAL LEG EDEMA  SCRATCH MARKS AND BLEEDING  Neuro: Moves all extremities, no focal defect  Psych: none on brief evaluation      Data Reviewed:  LABS:  CBC:                 10/29/17     10/30/17                       1115          0451          WBC          13.2*        13.5*         HGB          10.2*        9.9*          HCT          31.5*        30.6*         MCV          84.3         85.5          PLT          236          255           BMP:                 10/29/17     10/29/17     10/30/17                       1115          1807          0451          NA           137          138          139           K            2.7*         2.9*         2.8*          CL           96*          98           99            CO2          26           27           27            PHOS          --           --          3.1           BUN          39*          34*          29*           CREATININE   2.07*        1.87*        1.81*         LIVER PROFILE: @LABRCNT, ALB:3,BILIDIR:3,BILITOT:3,ALKPHOS:3)@  PT/INR: No results for input(s): PROTIME, INR in the last 72 hours. APTT: No results for input(s): APTT in the last 72 hours.   UA:                10/29/17                       2127          COLORU       YELLOW        PHUR         5.5           WBCUA        0 TO 2        RBCUA        2 TO 5        MUCUS        NOT REPORTED  TRICHOMONAS  NOT REPORTED  YEAST        NOT REPORTED  BACTERIA     FEW*          SPECGRAV     1.020         LEUKOCYTESUR NEGATIVE      UROBILINOGEN Normal        BILIRUBINUR  NEGATIVE      GLUCOSEU     NEGATIVE      AMORPHOUS    NOT REPORTED  No results for input(s): PHART, [id number], PO2ART in the last 72 hours. ECHOCARDIOGRAM:  Summary  Left ventricle is normal in size. Moderate to severe left ventricular hypertrophy. Global left ventricular systolic function is low normal with an estimated  ejection fraction of 50 % . Accelerated velocities though the LVOT likely due to LVH. Technically somewhat difficult study. All segments not well visualized. Cannot rule-out abnormal segmental wall motion. Left atrium is mildly dilated. Thickened mitral valve leaflets. Mild mitral regurgitation. Moderate tricuspid regurgitation. Mild pulmonary hypertension with an estimated right ventricular systolic  pressure of 47 mmHg. Aortic root dimension is at upper limit of normal.  IVC impaired inspiratory variation indicating elevated RA filling pressure  (i.e. CVP) . CHEST RADIOGRAPH:  CARDIOMEGALY; NO INFILTRATE, ATELECTASIS  Assessment:  CHRONIC RES FAILURE WITH NOCTURNAL HYPOXEMIA  OBSTRUCTIVE SLEEP APNEA  CORPULMONALE WITH LEG EDEMA  PULM ART HYPERTENSION        Plan:   BIPAP, OXYGEN, EDUCATION ON COMPLIANCE AND PRIMO DONE  TOTALLY NONCOMPLIANT WITH MEDS.  DOESNOT HAVE BIPAP NOW  IMPROVING; LEG EDEMA IS FROM CORPULMONALE, EHCO ORDERED  BLEEDING FROM LEG WOUNDS HAVE STOPPED    Gal Lr MD  PULMONARY, CRITICAL CARE  [facility] PULMONARY  SLEEP AND CRITICAL CARE GROUP  [phone]

## 2017-10-30 NOTE — PROGRESS NOTES
Psychiatry consultation. Chart reviewed and patient interviewed. This is a 51-year-old white male patient who was admitted on 29 October 2017 from emergency room because of hypertensive crisis. He reports that he had a scab on his leg which he pulled and could not stop bleeding so came to the emergency room. He states that he has history of psychiatric treatment in the past but none currently. He states that he has history of confusion, racing thoughts, mood swings. He denies any substance abuse. He has history of hypertension. He reports that his mother has history of psychiatry problems. He reports that he was born and raised in [place]. He graduated from high school and has had some college. 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 He has been  for 13 years and has 1 son. He lives with his wife and son and get along adequately. On mental status examination He is of average height and built, cooperative and informative, talking slowly in a conversational tone. His affect is appropriate and responds to questions slowly but relevantly and coherently. He denies any feelings of hopelessness helplessness, suicidal feelings, homicidal feelings, delusions or ideas of reference but he is not clear about hallucinations. He is alert and oriented to time place person and situation. His memory and intellectual functions are somewhat impaired which seems more due to poor concentration and thought process disorganization than any cognitive impairment. He has superficial judgment and partial insight. Diagnostic impression  : Bipolar disorder    Will manage his  psych. Medication and provide supportive therapy as well as develop insight. Side effects of medications reviewed and medication education provided.

## 2017-10-30 NOTE — PLAN OF CARE
Problem: Falls - Risk of  Goal: Absence of falls  Outcome: Ongoing  The patient remained free from falls this shift, call light within reach, bed in locked and lowest position. Side rails up x2. Continue to monitor closely. Bed alarm set. Problem: DAILY CARE  Goal: Daily care needs are met  Outcome: Ongoing  Assisted pt with bed bath and gown change at beginning of shift. Problem: SKIN INTEGRITY  Goal: Skin integrity is maintained or improved  Outcome: Ongoing  See skin assessment for details. interdry applied to abd folds & groin area due to redness and moisture. Problem: KNOWLEDGE DEFICIT  Goal: Patient/S.O. demonstrates understanding of disease process, treatment plan, medications, and discharge instructions.   Outcome: Ongoing

## 2017-10-31 LAB
ABSOLUTE EOS #: 0.36 K/UL (ref 0–0.44)
ABSOLUTE EOS #: 0.4 K/UL (ref 0–0.4)
ABSOLUTE IMMATURE GRANULOCYTE: 0.06 K/UL (ref 0–0.3)
ABSOLUTE IMMATURE GRANULOCYTE: ABNORMAL K/UL (ref 0–0.3)
ABSOLUTE LYMPH #: 2.31 K/UL (ref 1.1–3.7)
ABSOLUTE LYMPH #: 2.6 K/UL (ref 1–4.8)
ABSOLUTE MONO #: 1.01 K/UL (ref 0.1–1.2)
ABSOLUTE MONO #: 1.1 K/UL (ref 0.2–0.8)
ABSOLUTE RETIC #: 0.14 M/UL (ref 0.03–0.08)
ANION GAP SERPL CALCULATED.3IONS-SCNC: 13 MMOL/L (ref 9–17)
BASOPHILS # BLD: 1 %
BASOPHILS # BLD: 1 % (ref 0–2)
BASOPHILS ABSOLUTE: 0.08 K/UL (ref 0–0.2)
BASOPHILS ABSOLUTE: 0.2 K/UL (ref 0–0.2)
BUN BLDV-MCNC: 25 MG/DL (ref 6–20)
BUN/CREAT BLD: 15 (ref 9–20)
CALCIUM SERPL-MCNC: 8.8 MG/DL (ref 8.6–10.4)
CHLORIDE BLD-SCNC: 101 MMOL/L (ref 98–107)
CO2: 25 MMOL/L (ref 20–31)
CORTISOL COLLECTION INFO: NORMAL
CORTISOL: 11.7 UG/DL (ref 2.7–18.4)
CREAT SERPL-MCNC: 1.72 MG/DL (ref 0.7–1.2)
DIFFERENTIAL TYPE: ABNORMAL
DIFFERENTIAL TYPE: ABNORMAL
EOSINOPHILS RELATIVE PERCENT: 2 % (ref 1–4)
EOSINOPHILS RELATIVE PERCENT: 3 %
FOLATE: >20 NG/ML
FREE KAPPA/LAMBDA RATIO: 2.64 (ref 0.26–1.65)
GFR AFRICAN AMERICAN: 52 ML/MIN
GFR NON-AFRICAN AMERICAN: 43 ML/MIN
GFR SERPL CREATININE-BSD FRML MDRD: ABNORMAL ML/MIN/{1.73_M2}
GFR SERPL CREATININE-BSD FRML MDRD: ABNORMAL ML/MIN/{1.73_M2}
GLUCOSE BLD-MCNC: 125 MG/DL (ref 75–110)
GLUCOSE BLD-MCNC: 137 MG/DL (ref 75–110)
GLUCOSE BLD-MCNC: 140 MG/DL (ref 70–99)
GLUCOSE BLD-MCNC: 152 MG/DL (ref 75–110)
HCT VFR BLD CALC: 30 % (ref 40.7–50.3)
HCT VFR BLD CALC: 30.1 % (ref 41–53)
HEMOGLOBIN: 9.3 G/DL (ref 13–17)
HEMOGLOBIN: 9.9 G/DL (ref 13.5–17.5)
IGA: 212 MG/DL (ref 70–400)
IGG: 998 MG/DL (ref 700–1600)
IGM: 32 MG/DL (ref 40–230)
IMMATURE GRANULOCYTES: 0 %
IMMATURE GRANULOCYTES: ABNORMAL %
IMMATURE RETIC FRACT: 29.6 (ref 2.7–18.3)
KAPPA FREE LIGHT CHAINS QNT: 7.6 MG/DL (ref 0.37–1.94)
LACTATE DEHYDROGENASE: 285 U/L (ref 135–225)
LAMBDA FREE LIGHT CHAINS QNT: 2.88 MG/DL (ref 0.57–2.63)
LYMPHOCYTES # BLD: 16 % (ref 24–43)
LYMPHOCYTES # BLD: 17 %
MCH RBC QN AUTO: 27.8 PG (ref 25.2–33.5)
MCH RBC QN AUTO: 28.2 PG (ref 26–34)
MCHC RBC AUTO-ENTMCNC: 31 G/DL (ref 29.9–34.7)
MCHC RBC AUTO-ENTMCNC: 33 G/DL (ref 31–37)
MCV RBC AUTO: 85.4 FL (ref 80–100)
MCV RBC AUTO: 89.6 FL (ref 82.6–102.9)
MONOCYTES # BLD: 7 % (ref 3–12)
MONOCYTES # BLD: 8 %
PDW BLD-RTO: 15.8 % (ref 11.8–14.4)
PDW BLD-RTO: 17.5 % (ref 11.5–14.5)
PLATELET # BLD: 250 K/UL (ref 130–400)
PLATELET # BLD: 299 K/UL (ref 138–453)
PLATELET ESTIMATE: ABNORMAL
PLATELET ESTIMATE: ABNORMAL
PMV BLD AUTO: 10.1 FL (ref 8.1–13.5)
PMV BLD AUTO: 8.9 FL (ref 6–12)
POTASSIUM SERPL-SCNC: 3.5 MMOL/L (ref 3.7–5.3)
RBC # BLD: 3.35 M/UL (ref 4.21–5.77)
RBC # BLD: 3.53 M/UL (ref 4.5–5.9)
RBC # BLD: ABNORMAL 10*6/UL
RBC # BLD: ABNORMAL 10*6/UL
RETIC %: 4.2 % (ref 0.5–1.9)
RETIC HEMOGLOBIN: 28.1 PG (ref 28.2–35.7)
SEG NEUTROPHILS: 71 %
SEG NEUTROPHILS: 74 % (ref 36–65)
SEGMENTED NEUTROPHILS ABSOLUTE COUNT: 10.7 K/UL (ref 1.8–7.7)
SEGMENTED NEUTROPHILS ABSOLUTE COUNT: 10.93 K/UL (ref 1.5–8.1)
SODIUM BLD-SCNC: 139 MMOL/L (ref 135–144)
VITAMIN B-12: 485 PG/ML (ref 211–946)
WBC # BLD: 14.8 K/UL (ref 3.5–11.3)
WBC # BLD: 15 K/UL (ref 3.5–11)
WBC # BLD: ABNORMAL 10*3/UL
WBC # BLD: ABNORMAL 10*3/UL

## 2017-10-31 PROCEDURE — 80048 BASIC METABOLIC PNL TOTAL CA: CPT

## 2017-10-31 PROCEDURE — 85025 COMPLETE CBC W/AUTO DIFF WBC: CPT

## 2017-10-31 PROCEDURE — 82533 TOTAL CORTISOL: CPT

## 2017-10-31 PROCEDURE — 82947 ASSAY GLUCOSE BLOOD QUANT: CPT

## 2017-10-31 PROCEDURE — 36415 COLL VENOUS BLD VENIPUNCTURE: CPT

## 2017-10-31 PROCEDURE — 2000000000 HC ICU R&B

## 2017-10-31 PROCEDURE — 83883 ASSAY NEPHELOMETRY NOT SPEC: CPT

## 2017-10-31 PROCEDURE — 6370000000 HC RX 637 (ALT 250 FOR IP): Performed by: SPECIALIST

## 2017-10-31 PROCEDURE — 6370000000 HC RX 637 (ALT 250 FOR IP): Performed by: PSYCHIATRY & NEUROLOGY

## 2017-10-31 PROCEDURE — 85045 AUTOMATED RETICULOCYTE COUNT: CPT

## 2017-10-31 PROCEDURE — 2500000003 HC RX 250 WO HCPCS: Performed by: EMERGENCY MEDICINE

## 2017-10-31 PROCEDURE — 83615 LACTATE (LD) (LDH) ENZYME: CPT

## 2017-10-31 PROCEDURE — 82784 ASSAY IGA/IGD/IGG/IGM EACH: CPT

## 2017-10-31 PROCEDURE — 6370000000 HC RX 637 (ALT 250 FOR IP): Performed by: INTERNAL MEDICINE

## 2017-10-31 PROCEDURE — 94762 N-INVAS EAR/PLS OXIMTRY CONT: CPT

## 2017-10-31 PROCEDURE — 6360000002 HC RX W HCPCS: Performed by: INTERNAL MEDICINE

## 2017-10-31 RX ORDER — HYDRALAZINE HYDROCHLORIDE 50 MG/1
100 TABLET, FILM COATED ORAL EVERY 8 HOURS SCHEDULED
Status: DISCONTINUED | OUTPATIENT
Start: 2017-10-31 | End: 2017-11-03 | Stop reason: HOSPADM

## 2017-10-31 RX ORDER — BISACODYL 10 MG
10 SUPPOSITORY, RECTAL RECTAL DAILY PRN
Status: DISCONTINUED | OUTPATIENT
Start: 2017-10-31 | End: 2017-11-03 | Stop reason: HOSPADM

## 2017-10-31 RX ADMIN — NICARDIPINE HYDROCHLORIDE 12.5 MG/HR: 0.2 INJECTION, SOLUTION INTRAVENOUS at 17:04

## 2017-10-31 RX ADMIN — AMLODIPINE BESYLATE 10 MG: 10 TABLET ORAL at 11:23

## 2017-10-31 RX ADMIN — NICARDIPINE HYDROCHLORIDE 12.5 MG/HR: 0.2 INJECTION, SOLUTION INTRAVENOUS at 20:59

## 2017-10-31 RX ADMIN — NICARDIPINE HYDROCHLORIDE 10 MG/HR: 0.2 INJECTION, SOLUTION INTRAVENOUS at 09:37

## 2017-10-31 RX ADMIN — HYDRALAZINE HYDROCHLORIDE 50 MG: 50 TABLET, FILM COATED ORAL at 14:06

## 2017-10-31 RX ADMIN — HEPARIN SODIUM 5000 UNITS: 5000 INJECTION, SOLUTION INTRAVENOUS; SUBCUTANEOUS at 06:57

## 2017-10-31 RX ADMIN — POTASSIUM CHLORIDE 40 MEQ: 20 TABLET, EXTENDED RELEASE ORAL at 21:55

## 2017-10-31 RX ADMIN — MICONAZOLE NITRATE: 20.6 POWDER TOPICAL at 14:04

## 2017-10-31 RX ADMIN — SPIRONOLACTONE 25 MG: 25 TABLET, FILM COATED ORAL at 11:25

## 2017-10-31 RX ADMIN — HEPARIN SODIUM 5000 UNITS: 5000 INJECTION, SOLUTION INTRAVENOUS; SUBCUTANEOUS at 14:06

## 2017-10-31 RX ADMIN — SPIRONOLACTONE 25 MG: 25 TABLET, FILM COATED ORAL at 19:16

## 2017-10-31 RX ADMIN — NICARDIPINE HYDROCHLORIDE 15 MG/HR: 0.2 INJECTION, SOLUTION INTRAVENOUS at 05:15

## 2017-10-31 RX ADMIN — MICONAZOLE NITRATE: 20.6 POWDER TOPICAL at 18:39

## 2017-10-31 RX ADMIN — AMLODIPINE BESYLATE 10 MG: 10 TABLET ORAL at 21:56

## 2017-10-31 RX ADMIN — MICONAZOLE NITRATE: 20.6 POWDER TOPICAL at 11:25

## 2017-10-31 RX ADMIN — NICARDIPINE HYDROCHLORIDE 12.5 MG/HR: 0.2 INJECTION, SOLUTION INTRAVENOUS at 14:03

## 2017-10-31 RX ADMIN — NICARDIPINE HYDROCHLORIDE 10 MG/HR: 0.2 INJECTION, SOLUTION INTRAVENOUS at 01:36

## 2017-10-31 RX ADMIN — BISACODYL 10 MG: 10 SUPPOSITORY RECTAL at 19:16

## 2017-10-31 RX ADMIN — HEPARIN SODIUM 5000 UNITS: 5000 INJECTION, SOLUTION INTRAVENOUS; SUBCUTANEOUS at 21:55

## 2017-10-31 RX ADMIN — ARIPIPRAZOLE 10 MG: 5 TABLET ORAL at 11:24

## 2017-10-31 RX ADMIN — HYDRALAZINE HYDROCHLORIDE 100 MG: 50 TABLET, FILM COATED ORAL at 21:55

## 2017-10-31 RX ADMIN — POTASSIUM CHLORIDE 40 MEQ: 20 TABLET, EXTENDED RELEASE ORAL at 11:25

## 2017-10-31 RX ADMIN — INSULIN LISPRO 1 UNITS: 100 INJECTION, SOLUTION INTRAVENOUS; SUBCUTANEOUS at 18:39

## 2017-10-31 RX ADMIN — POTASSIUM CHLORIDE 40 MEQ: 20 TABLET, EXTENDED RELEASE ORAL at 14:05

## 2017-10-31 RX ADMIN — MICONAZOLE NITRATE: 20.6 POWDER TOPICAL at 21:56

## 2017-10-31 NOTE — PROGRESS NOTES
Nephrology Progress Note    Yandynicola Sam    No primary care provider on file. Follow Up for (CC) : JONNA, HTN    ASSESSMENT        1. Acute Kidney Injury likely hemodynamic vs CKD from Chronic uncontrolled HTN  2. Hypokalemia  3. Hyperaldosteronism? 4. Obesity     Active Problems:    Hypertensive urgency    JONNA (acute kidney injury) ([facility] [address]    Hypokalemia    Hyperglycemia    PRIMO (obstructive sleep apnea)    Obesity (BMI 35.0-39.9 without comorbidity)      PLAN     On Norvasc, hydralazine and aldactone. Still requiring Cardene high dose. Increase hydralazine  Avoid Catapress due to non-compliance and rebound HTN risks. Wean Cardene as tolerated  Pending serum jeevan and Plasma renin activity  Renal Ultrasound showed CKD and asymmetry. ? FRANKI. Vascular on the case. K supplements given and improving. Pending serum and urine protein Immunofixation to r/o element of occult paraprotein disease. Avoid nephrotoxic drugs and IV contrast exposure. Please do not hesitate to call with questions. SUBJECTIVE      Pt seen and examined at bed side. No complaints.     Chief Complaint   Patient presents with    Other     leg wound bleeding        Patient Active Problem List   Diagnosis    Hypertensive urgency    JONNA [facility]acute kidney injury) ([facility]    Hypokalemia    Hyperglycemia    PRIMO (obstructive sleep apnea)    Obesity (BMI 35.0-39.9 without comorbidity)       CURRENT MEDICATIONS      Current Facility-Administered Medications   Medication Dose Route Frequency Provider Last Rate Last Dose    potassium chloride (KLOR-CON M) extended release tablet 40 mEq  40 mEq Oral TID Diana Stevens MD   40 mEq at 10/31/17 1405    ARIPiprazole (ABILIFY) tablet 10 mg  10 mg Oral Daily Janae Merino MD   10 mg at 10/31/17 1124    spironolactone (ALDACTONE) tablet 25 mg  25 mg Oral BID Diana Stevens MD   25 mg at 10/31/17 1125    amLODIPine (NORVASC) tablet 10 mg  10 mg Oral BID Diana Stevens MD   10 mg at 10/31/17 1123    [Urine:2150]  Body mass index is 39.58 kg/m². PHYSICAL EXAM      GENERAL APPEARANCE:Awake, alert, in no acute distress  SKIN: warm and dry, no rash or erythema  EYES: conjunctivae normal and sclera anicteric  NECK:  JVD Absent. PULMONARY: Symmetrical and CTA BL. CADRDIOVASCULAR: S1 and S2 audible. ABDOMEN: soft nontender, bowel sounds present. EXTREMITIES: no cyanosis, clubbing or edema    LABS      Data:    CBC:   Recent Labs      10/29/17   1115  10/30/17   0451  10/31/17   0710   WBC  13.2*  13.5*  15.0*   HGB  10.2*  9.9*  9.9*   HCT  31.5*  30.6*  30.1*   MCV  84.3  85.5  85.4   PLT  236  255  250     BMP:    Recent Labs      10/29/17   1807  10/30/17   0451  10/31/17   0710   NA  138  139  139   K  2.9*  2.8*  3.5*   CL  98  99  101   CO2  27  27  25   BUN  34*  29*  25*   CREATININE  1.87*  1.81*  1.72*   GLUCOSE  152*  132*  140*     CMP:   Recent Labs      10/29/17   1807  10/30/17   0451  10/31/17   0710   NA  138  139  139   K  2.9*  2.8*  3.5*   CL  98  99  101   CO2  27  27  25   BUN  34*  29*  25*   CREATININE  1.87*  1.81*  1.72*   GLUCOSE  152*  132*  140*   CALCIUM  8.3*  8.5*  8.8   PROT   --   6.0*   --        Phosphorus:    Recent Labs      10/30/17   0451   PHOS  3.1     Ionized Calcium: No results for input(s): IONCA in the last 72 hours. Magnesium:   Recent Labs      10/30/17   0451   MG  2.1     Albumin: No results for input(s): LABALBU in the last 72 hours. URINALYSIS/URINE CHEMISTRIES      URINE SODIUM:    Lab Results   Component Value Date    DENA 32 10/29/2017      URINE OSMOLARITY:    Lab Results   Component Value Date    OSMOU 487 10/29/2017     URINE CREATININE:    Lab Results   Component Value Date    LABCREA 92.9 10/29/2017     URINE EOSINOPHILS: No components found for: EOSU  URINALYSIS:  U/A:     RADIOLOGY      No results found for this or any previous visit. [facility].   Ph: [phone]

## 2017-10-31 NOTE — PROGRESS NOTES
oriented to person, place and time with normal affect  Neck: good carotid pulses, no JVD  Lungs: clear to auscultation bilaterally, normal effort  Heart: regular rate and rhythm, no murmur,  Abdomen: soft, obese, non-tender, non-distended, bowel sounds present all four quadrants, no masses, hepatomegaly, splenomegaly or aortic enlargement  Extremities: Small varicosities with venous skin changes and several scabbed areas  Skin: no gross lesions, rashes, or induration    Assessment:   3 63-year-old male with bleeding varicose veins  2. Mild bilateral lower extremity venous reflux  3. Bipolar disorder    Patient Active Problem List:     Hypertensive urgency     JONNA (acute kidney injury) ([facility] [address]     Hypokalemia     Hyperglycemia     PRIMO (obstructive sleep apnea)     Obesity (BMI 35.0-39.9 without comorbidity)      Plan:   1. Fit with 30-40 mmHg knee-high Jobst stockings  2.  Continue blood pressure control    Electronically signed by Rose Ellis MD on 10/31/2017 at 5:39 PM

## 2017-10-31 NOTE — PROGRESS NOTES
PT EXAMINED, NOX DONE, AMBULANT IN THE ROOM OK FOR MED SURG                                                  PROGRESS NOTE: PULMONARY & CRITICAL Ramírez Albrecht MD     Patient - Jazmyne Mendez   MRN -  [id number]   [facility]   - 1970      Date of Admission -  10/29/2017 11:03 AM  Date of evaluation -  2017    Admit Date: 10/29/2017       2017    Subjective: denies chest pain, no hemoptysis, able to communicate, no cough    Objective:   Vitals: BP (!) 171/80   Pulse 93   Temp 98.6 °F (37 °C)   Resp 15   Ht 6' 1\" (1.854 m)   Wt 300 lb (136.1 kg)   SpO2 99%   BMI 39.58 kg/m²   General appearance: alert, comfortable  Skin: Skin color, texture, turgor normal. No rash  HEENT: Head: Normocephalic, no lesions, without obvious abnormality. Neck:  Supple, no JVD, normal on examination  Lungs: diminished breath sounds bibasilar  Heart: regular rate and rhythm, S1, S2 normal, P2 sounds normal  ABDOMEN: soft, bowel sounds positive, soft  Extremities: 1+ LEG EDEMA, HOMANS NEG  SKIN: turgor normal, no rash seen  Neurologic: Mental status: Alert, oriented,; moves all extremities. Data:   Scheduled Meds: Reviewed  Continuous Infusions:   niCARdipine in NaCl 2.5 mg/hr (17 0505)    dextrose         Intake/Output Summary (Last 24 hours) at 17 1144  Last data filed at 17 0510   Gross per 24 hour   Intake             1611 ml   Output              550 ml   Net             1061 ml     CBC:   Recent Labs      17   0452   WBC  14.9*   HGB  9.4*   PLT  294     BMP:  Recent Labs      17   0452   NA  139   K  4.1   CL  101   CO2  25   BUN  26*   CREATININE  1.90*   GLUCOSE  125*     NOX REVIEWED; NO DESATURATION DURING SLEEP. AMBULATING IN THE ROOM      Assessment/Discussion & Plan     OBSTRUCTIVE SLEEP APNEA  MORBID OBESITY  CORPULMONALE WITH LEG EDEMA  PULM ART HYPERTENSION    NOX AND PLAN DISCUSSED.  IS DOING BETTER WITH IMPROVED OXYGENATION AND DYSPNEA  Electronically signed by MD Deann Nj MD  PULMONARY, [place] [id number] GROUP   [phone]

## 2017-10-31 NOTE — PLAN OF CARE
Problem: Cardiac Output - Decreased:  Goal: Hemodynamic stability will improve  Hemodynamic stability will improve  Outcome: Ongoing  Pt remains on cardene gtt. titrating as ordered.

## 2017-11-01 LAB
ABSOLUTE EOS #: 0.5 K/UL (ref 0–0.4)
ABSOLUTE IMMATURE GRANULOCYTE: ABNORMAL K/UL (ref 0–0.3)
ABSOLUTE LYMPH #: 2.2 K/UL (ref 1–4.8)
ABSOLUTE MONO #: 0.8 K/UL (ref 0.2–0.8)
ALDOSTERONE COMMENT: NORMAL
ALDOSTERONE: 46.2 NG/DL
ANION GAP SERPL CALCULATED.3IONS-SCNC: 13 MMOL/L (ref 9–17)
BASOPHILS # BLD: 0 %
BASOPHILS ABSOLUTE: 0 K/UL (ref 0–0.2)
BUN BLDV-MCNC: 26 MG/DL (ref 6–20)
BUN/CREAT BLD: 14 (ref 9–20)
CALCIUM SERPL-MCNC: 8.6 MG/DL (ref 8.6–10.4)
CHLORIDE BLD-SCNC: 101 MMOL/L (ref 98–107)
CO2: 25 MMOL/L (ref 20–31)
CREAT SERPL-MCNC: 1.9 MG/DL (ref 0.7–1.2)
DIFFERENTIAL TYPE: ABNORMAL
EOSINOPHILS RELATIVE PERCENT: 3 %
GFR AFRICAN AMERICAN: 47 ML/MIN
GFR NON-AFRICAN AMERICAN: 39 ML/MIN
GFR SERPL CREATININE-BSD FRML MDRD: ABNORMAL ML/MIN/{1.73_M2}
GFR SERPL CREATININE-BSD FRML MDRD: ABNORMAL ML/MIN/{1.73_M2}
GLUCOSE BLD-MCNC: 121 MG/DL (ref 75–110)
GLUCOSE BLD-MCNC: 123 MG/DL (ref 75–110)
GLUCOSE BLD-MCNC: 125 MG/DL (ref 70–99)
GLUCOSE BLD-MCNC: 134 MG/DL (ref 75–110)
HAPTOGLOBIN: 145 MG/DL (ref 30–200)
HCT VFR BLD CALC: 28.6 % (ref 41–53)
HEMOGLOBIN: 9.4 G/DL (ref 13.5–17.5)
IMMATURE GRANULOCYTES: ABNORMAL %
LACTATE DEHYDROGENASE: 420 U/L (ref 135–225)
LYMPHOCYTES # BLD: 15 %
MCH RBC QN AUTO: 28 PG (ref 26–34)
MCHC RBC AUTO-ENTMCNC: 33 G/DL (ref 31–37)
MCV RBC AUTO: 84.9 FL (ref 80–100)
MONOCYTES # BLD: 5 %
PATHOLOGIST REVIEW: NORMAL
PDW BLD-RTO: 17.3 % (ref 11.5–14.5)
PLATELET # BLD: 294 K/UL (ref 130–400)
PLATELET ESTIMATE: ABNORMAL
PMV BLD AUTO: ABNORMAL FL (ref 6–12)
POTASSIUM SERPL-SCNC: 4.1 MMOL/L (ref 3.7–5.3)
RBC # BLD: 3.37 M/UL (ref 4.5–5.9)
RBC # BLD: ABNORMAL 10*6/UL
RENIN ACTIVITY: 9.5 NG/ML/HR
RENIN COMMENT: NORMAL
SEG NEUTROPHILS: 77 %
SEGMENTED NEUTROPHILS ABSOLUTE COUNT: 11.4 K/UL (ref 1.8–7.7)
SODIUM BLD-SCNC: 139 MMOL/L (ref 135–144)
SURGICAL PATHOLOGY REPORT: NORMAL
WBC # BLD: 14.9 K/UL (ref 3.5–11)
WBC # BLD: ABNORMAL 10*3/UL

## 2017-11-01 PROCEDURE — 82947 ASSAY GLUCOSE BLOOD QUANT: CPT

## 2017-11-01 PROCEDURE — 6370000000 HC RX 637 (ALT 250 FOR IP): Performed by: INTERNAL MEDICINE

## 2017-11-01 PROCEDURE — 80048 BASIC METABOLIC PNL TOTAL CA: CPT

## 2017-11-01 PROCEDURE — 2500000003 HC RX 250 WO HCPCS: Performed by: EMERGENCY MEDICINE

## 2017-11-01 PROCEDURE — 6370000000 HC RX 637 (ALT 250 FOR IP): Performed by: PSYCHIATRY & NEUROLOGY

## 2017-11-01 PROCEDURE — 6360000002 HC RX W HCPCS: Performed by: INTERNAL MEDICINE

## 2017-11-01 PROCEDURE — 6370000000 HC RX 637 (ALT 250 FOR IP): Performed by: SPECIALIST

## 2017-11-01 PROCEDURE — 36415 COLL VENOUS BLD VENIPUNCTURE: CPT

## 2017-11-01 PROCEDURE — 83010 ASSAY OF HAPTOGLOBIN QUANT: CPT

## 2017-11-01 PROCEDURE — 99254 IP/OBS CNSLTJ NEW/EST MOD 60: CPT | Performed by: INTERNAL MEDICINE

## 2017-11-01 PROCEDURE — 85025 COMPLETE CBC W/AUTO DIFF WBC: CPT

## 2017-11-01 PROCEDURE — 83615 LACTATE (LD) (LDH) ENZYME: CPT

## 2017-11-01 PROCEDURE — 2000000000 HC ICU R&B

## 2017-11-01 RX ORDER — CARVEDILOL 25 MG/1
25 TABLET ORAL 2 TIMES DAILY WITH MEALS
Status: DISCONTINUED | OUTPATIENT
Start: 2017-11-01 | End: 2017-11-03 | Stop reason: HOSPADM

## 2017-11-01 RX ADMIN — AMLODIPINE BESYLATE 10 MG: 10 TABLET ORAL at 09:26

## 2017-11-01 RX ADMIN — CARVEDILOL 25 MG: 25 TABLET, FILM COATED ORAL at 17:22

## 2017-11-01 RX ADMIN — HEPARIN SODIUM 5000 UNITS: 5000 INJECTION, SOLUTION INTRAVENOUS; SUBCUTANEOUS at 21:42

## 2017-11-01 RX ADMIN — POTASSIUM CHLORIDE 40 MEQ: 20 TABLET, EXTENDED RELEASE ORAL at 14:04

## 2017-11-01 RX ADMIN — ARIPIPRAZOLE 10 MG: 5 TABLET ORAL at 09:27

## 2017-11-01 RX ADMIN — NICARDIPINE HYDROCHLORIDE 5 MG/HR: 0.2 INJECTION, SOLUTION INTRAVENOUS at 12:57

## 2017-11-01 RX ADMIN — MICONAZOLE NITRATE: 20.6 POWDER TOPICAL at 09:27

## 2017-11-01 RX ADMIN — AMLODIPINE BESYLATE 10 MG: 10 TABLET ORAL at 21:44

## 2017-11-01 RX ADMIN — MICONAZOLE NITRATE: 20.6 POWDER TOPICAL at 21:43

## 2017-11-01 RX ADMIN — POTASSIUM CHLORIDE 40 MEQ: 20 TABLET, EXTENDED RELEASE ORAL at 09:26

## 2017-11-01 RX ADMIN — HYDRALAZINE HYDROCHLORIDE 100 MG: 50 TABLET, FILM COATED ORAL at 21:42

## 2017-11-01 RX ADMIN — HYDRALAZINE HYDROCHLORIDE 100 MG: 50 TABLET, FILM COATED ORAL at 06:27

## 2017-11-01 RX ADMIN — HEPARIN SODIUM 5000 UNITS: 5000 INJECTION, SOLUTION INTRAVENOUS; SUBCUTANEOUS at 14:04

## 2017-11-01 RX ADMIN — BISACODYL 10 MG: 10 SUPPOSITORY RECTAL at 15:39

## 2017-11-01 RX ADMIN — SPIRONOLACTONE 25 MG: 25 TABLET, FILM COATED ORAL at 09:26

## 2017-11-01 RX ADMIN — POTASSIUM CHLORIDE 40 MEQ: 20 TABLET, EXTENDED RELEASE ORAL at 21:42

## 2017-11-01 RX ADMIN — HYDRALAZINE HYDROCHLORIDE 100 MG: 50 TABLET, FILM COATED ORAL at 14:03

## 2017-11-01 RX ADMIN — NICARDIPINE HYDROCHLORIDE 2.5 MG/HR: 0.2 INJECTION, SOLUTION INTRAVENOUS at 20:26

## 2017-11-01 RX ADMIN — HEPARIN SODIUM 5000 UNITS: 5000 INJECTION, SOLUTION INTRAVENOUS; SUBCUTANEOUS at 06:27

## 2017-11-01 RX ADMIN — NICARDIPINE HYDROCHLORIDE 5 MG/HR: 0.2 INJECTION, SOLUTION INTRAVENOUS at 01:35

## 2017-11-01 NOTE — PROGRESS NOTES
Nephrology Progress Note    Richard Beaver    No primary care provider on file. Follow Up for (CC) : JONNA, HTN    ASSESSMENT        1. Acute Kidney Injury likely hemodynamic vs CKD from Chronic uncontrolled HTN  2. Hypokalemia  3. Hyperaldosteronism? 4. Obesity  5. Hypertension     Active Problems:    Hypertensive urgency    JONNA (acute kidney injury) ([facility] [address]    Hypokalemia    Hyperglycemia    PRIMO (obstructive sleep apnea)    Obesity (BMI 35.0-39.9 without comorbidity)      PLAN     Has elevated Aldosterone  Check Renin activity and renal artery doppler US  Add Coreg 25 mg BID  On Norvasc, and hydralazine   Discontinue Aldactone for now  Still requiring Cardene high dose. Avoid Catapress due to non-compliance and rebound HTN risks. Wean Cardene as tolerated  Pending serum jeevan and Plasma renin activity  Renal Ultrasound showed CKD and asymmetry. ? FRANKI. Vascular on the case. Has IgA Elke chains on serum and Urine  Immunofixation. Recommend Hem consult. Avoid nephrotoxic drugs and IV contrast exposure. Please do not hesitate to call with questions. SUBJECTIVE      Pt seen and examined at bed side. No complaints.   Still requiring Cardene drip    Chief Complaint   Patient presents with    Other     leg wound bleeding        Patient Active Problem List   Diagnosis    Hypertensive urgency    JONNA (acute kidney injury) ([facility]    Hypokalemia    Hyperglycemia    PRIMO (obstructive sleep apnea)    Obesity (BMI 35.0-39.9 without comorbidity)       CURRENT MEDICATIONS      Current Facility-Administered Medications   Medication Dose Route Frequency Provider Last Rate Last Dose    carvedilol (COREG) tablet 25 mg  25 mg Oral BID  Earnest Lennon MD        hydrALAZINE (APRESOLINE) tablet 100 mg  100 mg Oral 3 times per day Deborah Clark MD   100 mg at 11/01/17 1403    bisacodyl (DULCOLAX) suppository 10 mg  10 mg Rectal Daily PRN Sandi Ponce MD   10 mg at 11/01/17 1539    potassium chloride (KLOR-CON M) extended release tablet 40 mEq  40 mEq Oral TID Miguel Temple MD   40 mEq at 11/01/17 1404    ARIPiprazole (ABILIFY) tablet 10 mg  10 mg Oral Daily Justice Fong MD   10 mg at 11/01/17 0927    amLODIPine (NORVASC) tablet 10 mg  10 mg Oral BID Miguel Temple MD   10 mg at 11/01/17 0926    miconazole (MICOTIN) 2 % powder   Topical 4x Daily Migel Chaves MD        niCARdipine (CARDENE) 40 mg in 0.9 % sodium chloride 200 mL infusion  5 mg/hr Intravenous Continuous Kemiedwin Li MD 25 mL/hr at 11/01/17 1257 5 mg/hr at 11/01/17 1257    sodium chloride flush 0.9 % injection 10 mL  10 mL Intravenous 2 times per day Migel Chaves MD   Stopped at 10/31/17 1404    sodium chloride flush 0.9 % injection 10 mL  10 mL Intravenous PRN Migel Chaves MD        acetaminophen (TYLENOL) tablet 650 mg  650 mg Oral Q4H PRN Migel Chaves MD   650 mg at 10/29/17 2121    glucose (GLUTOSE) 40 % oral gel 15 g  15 g Oral PRN Migel Chaves MD        dextrose 50 % solution 12.5 g  12.5 g Intravenous PRN Migel Chaves MD        glucagon (rDNA) injection 1 mg  1 mg Intramuscular PRN Migel Chaves MD        dextrose 5 % solution  100 mL/hr Intravenous PRN Migel Chaves MD        insulin lispro (HUMALOG) injection vial 0-6 Units  0-6 Units Subcutaneous TID WC Migel Chaves MD   1 Units at 10/31/17 1839    insulin lispro (HUMALOG) injection vial 0-3 Units  0-3 Units Subcutaneous Nightly Migel Chaves MD   1 Units at 10/30/17 2036    morphine (PF) injection 1 mg  1 mg Intravenous Q4H PRN Migel Chaves MD   1 mg at 10/29/17 2224    heparin (porcine) injection 5,000 Units  5,000 Units Subcutaneous 3 times per day Migel Chaves MD   5,000 Units at 11/01/17 1404    ondansetron (ZOFRAN) injection 4 mg  4 mg Intravenous Q6H PRN Migel Chaves MD           REVIEW OF SYSTEMS     All other ROS is negative.     OBJECTIVE      Vitals:    11/01/17 1200   BP: (!) 167/82   Pulse: 87   Resp: 18 Temp: 98.1 °F (36.7 °C)   SpO2: 99%     Wt Readings from Last 3 Encounters:   10/29/17 300 lb (136.1 kg)     I/O last 3 completed shifts: In: 5465 [P.O.:820; I.V.:551]  Out: 300 [Urine:300]  Body mass index is 39.58 kg/m². PHYSICAL EXAM      GENERAL APPEARANCE:Awake, alert, in no acute distress  SKIN: warm and dry, no rash or erythema  EYES: conjunctivae normal and sclera anicteric  NECK:  JVD Absent. PULMONARY: Symmetrical and CTA BL. CADRDIOVASCULAR: S1 and S2 audible. ABDOMEN: soft nontender, bowel sounds present. EXTREMITIES: no cyanosis, clubbing or edema    LABS      Data:    CBC:   Recent Labs      10/31/17   0710  10/31/17   1425  11/01/17 0452   WBC  15.0*  14.8*  14.9*   HGB  9.9*  9.3*  9.4*   HCT  30.1*  30.0*  28.6*   MCV  85.4  89.6  84.9   PLT  250  299  294     BMP:    Recent Labs      10/30/17   0451  10/31/17   0710  11/01/17   0452   NA  139  139  139   K  2.8*  3.5*  4.1   CL  99  101  101   CO2  27  25  25   BUN  29*  25*  26*   CREATININE  1.81*  1.72*  1.90*   GLUCOSE  132*  140*  125*     CMP:   Recent Labs      10/30/17   0451  10/31/17   0710  11/01/17   0452   NA  139  139  139   K  2.8*  3.5*  4.1   CL  99  101  101   CO2  27  25  25   BUN  29*  25*  26*   CREATININE  1.81*  1.72*  1.90*   GLUCOSE  132*  140*  125*   CALCIUM  8.5*  8.8  8.6   PROT  6.0*   --    --        Phosphorus:    Recent Labs      10/30/17   0451   PHOS  3.1     Ionized Calcium: No results for input(s): IONCA in the last 72 hours. Magnesium:   Recent Labs      10/30/17   0451   MG  2.1     Albumin: No results for input(s): LABALBU in the last 72 hours.                     URINALYSIS/URINE CHEMISTRIES      URINE SODIUM:    Lab Results   Component Value Date    DENA 32 10/29/2017      URINE OSMOLARITY:    Lab Results   Component Value Date    OSMOU 487 10/29/2017     URINE CREATININE:    Lab Results   Component Value Date    LABCREA 92.9 10/29/2017     URINE EOSINOPHILS: No components found for: EOSU  URINALYSIS:  U/A:     RADIOLOGY      No results found for this or any previous visit. [address] [facility].   Ph: [phone]

## 2017-11-01 NOTE — PROGRESS NOTES
OFF  TO SEE US IN OUR OFFICE WITH DR MELÉNDEZ FOR SLEEP STUDY.  RN INFORMED AND PT AWARE    Electronically signed by MD John Paul Becerril MD  [facility], [facility]   [phone]

## 2017-11-01 NOTE — PROGRESS NOTES
He reports that he  is doing slightly better as racing thoughts are improving ,less confused. .  Affect -  Superficial  Mood -    improving  Thought process -  Disorganized,looseness of association  Cognition -  Improving  Memory- Recent-Adequate                 Remote-OK  Orientation-Oriented to time ,person and place. Insight-Partial  Judgement-superficial                                                     Medications reviewed. .Side effects of medications reviewed and medication education provided  Encouraged to interact with peers  and participate in activities  Reassurance and support provided. No side effects including akathisia,tremers,dystonia or orthostasis reported or observed. Plan: Stabilization with antipsychotic and mood stabilization medications,group therapy and develop coping skills,discharge to community.

## 2017-11-01 NOTE — PLAN OF CARE
Problem: Falls - Risk of  Goal: Absence of falls  Outcome: Ongoing      Problem: SAFETY  Goal: Free from intentional harm  Outcome: Ongoing      Problem: DAILY CARE  Goal: Daily care needs are met  Outcome: Ongoing      Problem: PAIN  Goal: Patient's pain/discomfort is manageable  Outcome: Ongoing      Problem: SKIN INTEGRITY  Goal: Skin integrity is maintained or improved  Outcome: Ongoing

## 2017-11-02 LAB
ABSOLUTE EOS #: 0.5 K/UL (ref 0–0.4)
ABSOLUTE IMMATURE GRANULOCYTE: ABNORMAL K/UL (ref 0–0.3)
ABSOLUTE LYMPH #: 1.8 K/UL (ref 1–4.8)
ABSOLUTE MONO #: 0.5 K/UL (ref 0.2–0.8)
ANION GAP SERPL CALCULATED.3IONS-SCNC: 12 MMOL/L (ref 9–17)
BASOPHILS # BLD: 1 %
BASOPHILS ABSOLUTE: 0.1 K/UL (ref 0–0.2)
BUN BLDV-MCNC: 25 MG/DL (ref 6–20)
BUN/CREAT BLD: 14 (ref 9–20)
CALCIUM SERPL-MCNC: 8.8 MG/DL (ref 8.6–10.4)
CHLORIDE BLD-SCNC: 102 MMOL/L (ref 98–107)
CO2: 25 MMOL/L (ref 20–31)
CREAT SERPL-MCNC: 1.79 MG/DL (ref 0.7–1.2)
DIFFERENTIAL TYPE: ABNORMAL
EOSINOPHILS RELATIVE PERCENT: 4 %
GFR AFRICAN AMERICAN: 50 ML/MIN
GFR NON-AFRICAN AMERICAN: 41 ML/MIN
GFR SERPL CREATININE-BSD FRML MDRD: ABNORMAL ML/MIN/{1.73_M2}
GFR SERPL CREATININE-BSD FRML MDRD: ABNORMAL ML/MIN/{1.73_M2}
GLUCOSE BLD-MCNC: 113 MG/DL (ref 75–110)
GLUCOSE BLD-MCNC: 118 MG/DL (ref 70–99)
GLUCOSE BLD-MCNC: 122 MG/DL (ref 75–110)
GLUCOSE BLD-MCNC: 152 MG/DL (ref 75–110)
HCT VFR BLD CALC: 31 % (ref 41–53)
HEMOGLOBIN: 10 G/DL (ref 13.5–17.5)
IMMATURE GRANULOCYTES: ABNORMAL %
LYMPHOCYTES # BLD: 15 %
MCH RBC QN AUTO: 27.1 PG (ref 26–34)
MCHC RBC AUTO-ENTMCNC: 32.3 G/DL (ref 31–37)
MCV RBC AUTO: 83.9 FL (ref 80–100)
MONOCYTES # BLD: 5 %
PDW BLD-RTO: 16.4 % (ref 11.5–14.5)
PLATELET # BLD: 306 K/UL (ref 130–400)
PLATELET ESTIMATE: ABNORMAL
PMV BLD AUTO: ABNORMAL FL (ref 6–12)
POTASSIUM SERPL-SCNC: 4.7 MMOL/L (ref 3.7–5.3)
RBC # BLD: 3.7 M/UL (ref 4.5–5.9)
RBC # BLD: ABNORMAL 10*6/UL
SEG NEUTROPHILS: 75 %
SEGMENTED NEUTROPHILS ABSOLUTE COUNT: 9.1 K/UL (ref 1.8–7.7)
SODIUM BLD-SCNC: 139 MMOL/L (ref 135–144)
WBC # BLD: 12 K/UL (ref 3.5–11)
WBC # BLD: ABNORMAL 10*3/UL

## 2017-11-02 PROCEDURE — 6370000000 HC RX 637 (ALT 250 FOR IP): Performed by: INTERNAL MEDICINE

## 2017-11-02 PROCEDURE — 2580000003 HC RX 258: Performed by: INTERNAL MEDICINE

## 2017-11-02 PROCEDURE — 80048 BASIC METABOLIC PNL TOTAL CA: CPT

## 2017-11-02 PROCEDURE — 99232 SBSQ HOSP IP/OBS MODERATE 35: CPT | Performed by: INTERNAL MEDICINE

## 2017-11-02 PROCEDURE — 36415 COLL VENOUS BLD VENIPUNCTURE: CPT

## 2017-11-02 PROCEDURE — 82947 ASSAY GLUCOSE BLOOD QUANT: CPT

## 2017-11-02 PROCEDURE — 93976 VASCULAR STUDY: CPT

## 2017-11-02 PROCEDURE — 6370000000 HC RX 637 (ALT 250 FOR IP): Performed by: SPECIALIST

## 2017-11-02 PROCEDURE — 85025 COMPLETE CBC W/AUTO DIFF WBC: CPT

## 2017-11-02 PROCEDURE — 94761 N-INVAS EAR/PLS OXIMETRY MLT: CPT

## 2017-11-02 PROCEDURE — 6370000000 HC RX 637 (ALT 250 FOR IP): Performed by: PSYCHIATRY & NEUROLOGY

## 2017-11-02 PROCEDURE — 84244 ASSAY OF RENIN: CPT

## 2017-11-02 PROCEDURE — 6360000002 HC RX W HCPCS: Performed by: INTERNAL MEDICINE

## 2017-11-02 PROCEDURE — 2060000000 HC ICU INTERMEDIATE R&B

## 2017-11-02 RX ADMIN — ARIPIPRAZOLE 10 MG: 5 TABLET ORAL at 08:39

## 2017-11-02 RX ADMIN — CARVEDILOL 25 MG: 25 TABLET, FILM COATED ORAL at 17:42

## 2017-11-02 RX ADMIN — AMLODIPINE BESYLATE 10 MG: 10 TABLET ORAL at 21:27

## 2017-11-02 RX ADMIN — CARVEDILOL 25 MG: 25 TABLET, FILM COATED ORAL at 08:39

## 2017-11-02 RX ADMIN — INSULIN LISPRO 1 UNITS: 100 INJECTION, SOLUTION INTRAVENOUS; SUBCUTANEOUS at 21:24

## 2017-11-02 RX ADMIN — HEPARIN SODIUM 5000 UNITS: 5000 INJECTION, SOLUTION INTRAVENOUS; SUBCUTANEOUS at 21:25

## 2017-11-02 RX ADMIN — MICONAZOLE NITRATE: 20.6 POWDER TOPICAL at 17:42

## 2017-11-02 RX ADMIN — MICONAZOLE NITRATE: 20.6 POWDER TOPICAL at 21:22

## 2017-11-02 RX ADMIN — HYDRALAZINE HYDROCHLORIDE 100 MG: 50 TABLET, FILM COATED ORAL at 14:25

## 2017-11-02 RX ADMIN — HEPARIN SODIUM 5000 UNITS: 5000 INJECTION, SOLUTION INTRAVENOUS; SUBCUTANEOUS at 14:26

## 2017-11-02 RX ADMIN — MICONAZOLE NITRATE: 20.6 POWDER TOPICAL at 08:39

## 2017-11-02 RX ADMIN — HYDRALAZINE HYDROCHLORIDE 100 MG: 50 TABLET, FILM COATED ORAL at 05:28

## 2017-11-02 RX ADMIN — POTASSIUM CHLORIDE 40 MEQ: 20 TABLET, EXTENDED RELEASE ORAL at 21:27

## 2017-11-02 RX ADMIN — POTASSIUM CHLORIDE 40 MEQ: 20 TABLET, EXTENDED RELEASE ORAL at 08:39

## 2017-11-02 RX ADMIN — AMLODIPINE BESYLATE 10 MG: 10 TABLET ORAL at 08:39

## 2017-11-02 RX ADMIN — HEPARIN SODIUM 5000 UNITS: 5000 INJECTION, SOLUTION INTRAVENOUS; SUBCUTANEOUS at 05:27

## 2017-11-02 RX ADMIN — HYDRALAZINE HYDROCHLORIDE 100 MG: 50 TABLET, FILM COATED ORAL at 21:27

## 2017-11-02 RX ADMIN — Medication 10 ML: at 21:46

## 2017-11-02 RX ADMIN — Medication 10 ML: at 08:41

## 2017-11-02 RX ADMIN — POTASSIUM CHLORIDE 40 MEQ: 20 TABLET, EXTENDED RELEASE ORAL at 14:25

## 2017-11-02 RX ADMIN — MICONAZOLE NITRATE: 20.6 POWDER TOPICAL at 14:25

## 2017-11-02 ASSESSMENT — PAIN SCALES - GENERAL
PAINLEVEL_OUTOF10: 0

## 2017-11-02 NOTE — PROGRESS NOTES
2 times per day Sosa Cordero MD   10 mL at 11/02/17 0841    sodium chloride flush 0.9 % injection 10 mL  10 mL Intravenous PRN Sosa Cordero MD        acetaminophen (TYLENOL) tablet 650 mg  650 mg Oral Q4H PRN Sosa Cordero MD   650 mg at 10/29/17 2121    glucose (GLUTOSE) 40 % oral gel 15 g  15 g Oral PRN Soas Cordero MD        dextrose 50 % solution 12.5 g  12.5 g Intravenous PRN Sosa Cordero MD        glucagon (rDNA) injection 1 mg  1 mg Intramuscular PRN Sosa Cordero MD        dextrose 5 % solution  100 mL/hr Intravenous PRN Sosa Cordero MD        insulin lispro (HUMALOG) injection vial 0-6 Units  0-6 Units Subcutaneous TID WC Sosa Cordero MD   1 Units at 10/31/17 1839    insulin lispro (HUMALOG) injection vial 0-3 Units  0-3 Units Subcutaneous Nightly Sosa Cordero MD   1 Units at 10/30/17 2036    morphine (PF) injection 1 mg  1 mg Intravenous Q4H PRN Sosa Cordero MD   1 mg at 10/29/17 2224    heparin (porcine) injection 5,000 Units  5,000 Units Subcutaneous 3 times per day Sosa Cordero MD   5,000 Units at 11/02/17 0527    ondansetron (ZOFRAN) injection 4 mg  4 mg Intravenous Q6H PRN Sosa Cordero MD         REVIEW OF SYSTEMS:    Constitutional: No fever or chills. No night sweats, no weight loss   Eyes: No eye discharge, double vision, or eye pain   HEENT: negative for sore mouth, sore throat, hoarseness and voice change   Respiratory: negative for cough , sputum, dyspnea, wheezing, hemoptysis, chest pain   Cardiovascular: negative for chest pain, dyspnea, palpitations, orthopnea, PND   Gastrointestinal: negative for nausea, vomiting, diarrhea, constipation, abdominal pain, Dysphagia, hematemesis and hematochezia   Genitourinary: negative for frequency, dysuria, nocturia, urinary incontinence, and hematuria   Integument: negative for rash, skin lesions, bruises.    Hematologic/Lymphatic: negative for easy bruising, bleeding, lymphadenopathy, or petechiae Value: A ZONE OF RESTRICTION IS PRESENT IN THE BETA GLOBULIN REGION.  CONFIRMED BY   Comment:  IMMUNOFIXATION TO BE MONOCLONAL   IgA,   Kappa. (0.07 g/dl)       Ref. Range 10/30/2017 21:31   Ferritin Latest Ref Range: 30 - 400 ug/L 58   Iron Latest Ref Range: 59 - 158 ug/dL 17 (L)   Folate Latest Ref Range: >4.8 ng/mL >20.0   Vitamin B-12 Latest Ref Range: 211 - 946 pg/mL 485       IMAGING DATA:  Reviewed    Primary Problem  <principal problem not specified>    Active Hospital Problems    Diagnosis Date Noted    Hypertensive urgency [I16.0] 10/29/2017    JONNA (acute kidney injury) ([facility] [address] [N17.9] 10/29/2017    Hypokalemia [E87.6] 10/29/2017    Hyperglycemia [R73.9] 10/29/2017    PRIMO (obstructive sleep apnea) [G47.33] 10/29/2017    Obesity (BMI 35.0-39.9 without comorbidity) [E66.9] 10/29/2017       IMPRESSION:   1. MGUS: very mild M protein, 0.07 gm/dl IgG kappa  2. Anemia: likely due to Blood loss  3. JONNA: nephro on board  4. HTN  leukocytosis    RECOMMENDATIONS:  1. Mild MGUS, no indication for BM biopsy and will check Immunoglobulin panel. Patient will need follow-up with hematology in 4-6 weeks after discharge. 2. Transfuse PRBC if Hb less than 7  3. Blood smear did not show morphologic abnormalities   4. No hemolysis  5. Hb stable  6. Will follow      Discussed with patient and Nurse. Thank you for asking us to see this patient.     Kameron Rush MD  Hematologist/Medical Oncologist  Cell: [phone]

## 2017-11-02 NOTE — PROGRESS NOTES
Follow-up hypertension  Patient slow to respond but appropriate denies any complaint no headache no chest pain no palpitation no shortness of breath  Review of system  No fever no chills no GI/ complaints no code per minute complaints no TIA no bleeding no polyuria nor polydipsia  Physical exam vitals stable blood pressures still up   Eyes no pallor no jaundice  Heart regular rate and rhythm no gallop abdomen soft soundswithoutanyandtenderness  With his good pulses without Homans sign mild edema positive varicose veins  Neuro alert oriented ×3 no focal deficit  Assessment and plan  Hypertensive urgency  Acute kidney injury  Normocytic anemia  Morbid obesity  Likely sleep apnea  Bipolar disorder  LVOT will ask cardiology to see  Meds labs reviewed  Iron deficiency anemia  We'll ask hematology to see for paraproteinemia  GI to see for iron deficiency anemia see orders

## 2017-11-02 NOTE — PROGRESS NOTES
[facility]  Attending Progress Note    S: Denies chest pain, palpitations, dizziness and syncope    O: Resting bed. Off Cardene drip since midnight    /67   Pulse 70   Temp 97.9 °F (36.6 °C) (Infrared)   Resp 22   Ht 6' 1\" (1.854 m)   Wt 300 lb (136.1 kg)   SpO2 99%   BMI 39.58 kg/m²     Intake/Output Summary (Last 24 hours) at 11/02/17 1126  Last data filed at 11/02/17 0217   Gross per 24 hour   Intake              442 ml   Output             1400 ml   Net             -958 ml       CONSTITUTIONAL:  awake, alert, cooperative, no apparent distress, and appears stated age  LUNGS:  No increased work of breathing, good air exchange, clear to auscultation bilaterally, no crackles or wheezing  CARDIOVASCULAR:  Normal apical impulse, regular rate and rhythm, normal S1 and S2, no S3 or S4, and no murmur noted  ABDOMEN:  No scars, normal bowel sounds, soft, non-distended, non-tender, no masses palpated, no hepatosplenomegally  MUSCULOSKELETAL:  +2 BLE edema  NEUROLOGIC:  Awake, alert, oriented to name, place and time.     Labs:  Lab Results   Component Value Date    WBC 12.0 11/02/2017    HGB 10.0 11/02/2017     11/02/2017     Lab Results   Component Value Date     11/02/2017    K 4.7 11/02/2017    CO2 25 11/02/2017    BUN 25 11/02/2017    CREATININE 1.79 11/02/2017       Lab Results   Component Value Date    MG 2.1 10/30/2017         Scheduled Meds:   carvedilol  25 mg Oral BID     hydrALAZINE  100 mg Oral 3 times per day    potassium chloride  40 mEq Oral TID    ARIPiprazole  10 mg Oral Daily    amLODIPine  10 mg Oral BID    miconazole   Topical 4x Daily    sodium chloride flush  10 mL Intravenous 2 times per day    insulin lispro  0-6 Units Subcutaneous TID WC    insulin lispro  0-3 Units Subcutaneous Nightly    heparin (porcine)  5,000 Units Subcutaneous 3 times per day     Continuous Infusions:   niCARdipine in NaCl Stopped (11/02/17 0009)    dextrose       PRN

## 2017-11-02 NOTE — CONSULTS
completed. Pertinent positives identified in the HPI, all other review of symptoms negative but he is a very vague, poor historian    · Constitutional: there has been no unanticipated weight loss. · Eyes: No visual changes or diplopia. No scleral icterus. · ENT: No Headaches,  · Cardiovascular: No chest pain, loss of consciousness. No pleuritic pain, or phlebitis. · Respiratory: No cough or wheezing, no sputum production. No hematemesis. · Gastrointestinal: No abdominal pain, appetite loss, blood in stools. No change in bowel or bladder habits. · Genitourinary: No dysuria, trouble voiding, or hematuria. · Musculoskeletal:  No gait disturbance, weakness or joint complaints. · Neurological: No headache, diplopia, change in muscle strength, numbness or tingling. No change in gait, balance, coordination, mood, affect, memory, mentation, behavior. · Endocrine: No malaise, fatigue or temperature intolerance. No excessive thirst, fluid intake, or urination. No tremor. · Hematologic/Lymphatic: No abnormal bruising or bleeding, blood clots or swollen lymph nodes. · Allergic/Immunologic: No nasal congestion or hives. Physical Examination:    Vitals:    11/01/17 1800   BP: 132/74   Pulse: 71   Resp: 13   Temp:    SpO2: 97%    Weight: 300 lb (136.1 kg)         General Appearance: Morbidly obese. Alert, no distress, appears stated age   Head:  Normocephalic, without obvious abnormality, atraumatic   Eyes:  PERRL, conjunctiva/corneas clear   Nose: Nares normal, no drainage or sinus tenderness   Throat: Lips, mucosa, and tongue normal   Neck: Supple, symmetrical, no carotid bruit or JVD   Lungs:   Clear to auscultation bilaterally, respirations unlabored   Chest Wall:  No tenderness or deformity   Heart:  Regular rate and rhythm, S1, S2 normal, no murmur, rub or gallop   Abdomen:   Soft, non-tender, bowel sounds active all four quadrants,  no masses, no organomegaly   Extremities: +2 edema.  Wearing compression stockings   Skin: Skin color, texture, turgor normal, no rashes or lesions   Pysch: Flat affect   Neurologic: Normal gross motor and sensory exam.         Labs  CBC:   Lab Results   Component Value Date    WBC 14.9 11/01/2017    RBC 3.37 11/01/2017    HGB 9.4 11/01/2017    HCT 28.6 11/01/2017    MCV 84.9 11/01/2017    RDW 17.3 11/01/2017     11/01/2017     CMP:    Lab Results   Component Value Date     11/01/2017    K 4.1 11/01/2017     11/01/2017    CO2 25 11/01/2017    BUN 26 11/01/2017    CREATININE 1.90 11/01/2017    GFRAA 47 11/01/2017    LABGLOM 39 11/01/2017    GLUCOSE 125 11/01/2017    PROT 6.0 10/30/2017    CALCIUM 8.6 11/01/2017     PT/INR:  No results found for: PTINR  Lab Results   Component Value Date    TROPONINT 0.06 (H) 10/29/2017     Echo: Moderate to severe LVH with intact function    Assessment  1. Hypertensive urgency  2. Left ventricular hypertrophy  3. Acute renal failure  4. Obesity      Plan:    I had the opportunity to review the clinical symptoms and presentation of [place]. · Amlodipine was started on the 29th. Hopefully its effect will be increasing enough by tomorrow to adequately control his BP and allow Cardene to start weaning off  · If not would consider Nifedipine (sustained release)  · No evidence of acute cardiac pathology otherwise    Thank you for allowing to us to participate in the [facility] or [facility]. Further evaluation will be based upon the patient's clinical course and testing results. All questions and concerns were addressed to the patient/family. Alternatives to my treatment were discussed. The note was completed using EMR. Every effort was made to ensure accuracy; however, inadvertent computerized transcription errors may be present.         Electronically signed by Jesusita Quigley MD on 11/1/2017 at 9:28 PM

## 2017-11-02 NOTE — FLOWSHEET NOTE
Patient in bed; acknowledges ; closes eyes and ignores ; expresses no spiritual/emotional needs at this time. Spiritual Care will follow as needed.      11/02/17 1309   Encounter Summary   Services provided to: Patient   Referral/Consult From: Rounding   Continue Visiting (11/2/17)   Complexity of Encounter Low   Length of Encounter 15 minutes   Routine   Type Follow up   Assessment Passive   Intervention Active listening   Outcome Did not respond

## 2017-11-02 NOTE — PROGRESS NOTES
.He has been doing much better and has not been feeling as overwhelmed or confused. .Affect-Brighter  . Mood -   Stable  Thought process -   improving  Thought content-no delusions  Cognition -  Intact  Memory- Recent-Adequate                Remote-OK  Orientation-OK                                               Insight-Partial  Judgement-Superficial                                                Attempted to develop insight. Medications reviewed. Side effects of medications reviewed and medication education provided. No side effects including akathisia,tremers,dystonia or orthostasis reported or observed. Plan: Stabilization with mood stabilizing  medications,supportive therapy and develop coping skills,discharge to community. Encouraged to interact with peers  and participate in activities. Aster Pepe

## 2017-11-02 NOTE — PROGRESS NOTES
Nephrology Progress Note    Robin Eddy    No primary care provider on file. Follow Up for (CC) : JONNA, HTN    ASSESSMENT        1. Acute Kidney Injury likely hemodynamic vs CKD from Chronic uncontrolled HTN  2. Hypokalemia  3. Hyperaldosteronism? 4. Obesity  5. Hypertension     Active Problems:    Hypertensive urgency    JONNA (acute kidney injury) (HCC)    Hypokalemia    Hyperglycemia    PRIMO (obstructive sleep apnea)    Obesity (BMI 35.0-39.9 without comorbidity)      PLAN     Cr improving  Off Cardene drip  BP under reasonable control  Has elevated Aldosterone  Renin activity and renal artery doppler US pending  Continue  Coreg, Norvasc, and hydralazine   Avoid Catapress due to non-compliance and rebound HTN risks. Renal Ultrasound showed CKD and asymmetry. ? FRANKI. Vascular on the case. Has IgA Elke chains on serum and Urine  Immunofixation. Hematology input appreciated  Avoid nephrotoxic drugs and IV contrast exposure. Ok To be discharged from nephrology prospective  Will need to f/up with nephrology in 3-4 weeks with pre visit BMP  Please do not hesitate to call with questions. SUBJECTIVE      Pt seen and examined at bed side. No complaints.   Off Cardene drip    Chief Complaint   Patient presents with    Other     leg wound bleeding        Patient Active Problem List   Diagnosis    Hypertensive urgency    JONNA (acute kidney injury) ([address]    Hypokalemia    Hyperglycemia    PRIMO (obstructive sleep apnea)    Obesity (BMI 35.0-39.9 without comorbidity)       CURRENT MEDICATIONS      Current Facility-Administered Medications   Medication Dose Route Frequency Provider Last Rate Last Dose    carvedilol (COREG) tablet 25 mg  25 mg Oral BID  Earnest Lennon MD   25 mg at 11/02/17 0839    hydrALAZINE (APRESOLINE) tablet 100 mg  100 mg Oral 3 times per day Chetan Black MD   100 mg at 11/02/17 0528    bisacodyl (DULCOLAX) suppository 10 mg  10 mg Rectal Daily PRN Vj Balderas MD   10 mg at 11/01/17 1539    potassium chloride (KLOR-CON M) extended release tablet 40 mEq  40 mEq Oral TID Arlet Torre MD   40 mEq at 11/02/17 0839    ARIPiprazole (ABILIFY) tablet 10 mg  10 mg Oral Daily Zan Beaver MD   10 mg at 11/02/17 0839    amLODIPine (NORVASC) tablet 10 mg  10 mg Oral BID Arlet Torre MD   10 mg at 11/02/17 0839    miconazole (MICOTIN) 2 % powder   Topical 4x Daily Alla Medina MD        niCARdipine (CARDENE) 40 mg in 0.9 % sodium chloride 200 mL infusion  5 mg/hr Intravenous Continuous Tracey Souza MD   Stopped at 11/02/17 0009    sodium chloride flush 0.9 % injection 10 mL  10 mL Intravenous 2 times per day Alla Medina MD   10 mL at 11/02/17 0841    sodium chloride flush 0.9 % injection 10 mL  10 mL Intravenous PRN Alla Medina MD        acetaminophen (TYLENOL) tablet 650 mg  650 mg Oral Q4H PRN Alla Medina MD   650 mg at 10/29/17 2121    glucose (GLUTOSE) 40 % oral gel 15 g  15 g Oral PRN Alla Medina MD        dextrose 50 % solution 12.5 g  12.5 g Intravenous PRN Alla Medina MD        glucagon (rDNA) injection 1 mg  1 mg Intramuscular PRN Alla Medina MD        dextrose 5 % solution  100 mL/hr Intravenous PRN Alla Medina MD        insulin lispro (HUMALOG) injection vial 0-6 Units  0-6 Units Subcutaneous TID WC Alla Medina MD   1 Units at 10/31/17 1839    insulin lispro (HUMALOG) injection vial 0-3 Units  0-3 Units Subcutaneous Nightly Alla Medina MD   1 Units at 10/30/17 2036    morphine (PF) injection 1 mg  1 mg Intravenous Q4H PRN Alla Medina MD   1 mg at 10/29/17 2224    heparin (porcine) injection 5,000 Units  5,000 Units Subcutaneous 3 times per day Alla Medina MD   5,000 Units at 11/02/17 0527    ondansetron (ZOFRAN) injection 4 mg  4 mg Intravenous Q6H PRN Alla Medina MD           REVIEW OF SYSTEMS     All other ROS is negative.     OBJECTIVE      Vitals:    11/02/17 1100   BP: 139/67   Pulse: 70   Resp: RADIOLOGY      No results found for this or any previous visit. [address] [facility].   Ph: [phone]

## 2017-11-03 VITALS
WEIGHT: 300 LBS | HEART RATE: 68 BPM | SYSTOLIC BLOOD PRESSURE: 137 MMHG | RESPIRATION RATE: 20 BRPM | DIASTOLIC BLOOD PRESSURE: 92 MMHG | BODY MASS INDEX: 39.76 KG/M2 | TEMPERATURE: 97.4 F | OXYGEN SATURATION: 98 % | HEIGHT: 73 IN

## 2017-11-03 PROBLEM — F31.9 BIPOLAR 1 DISORDER (HCC): Status: ACTIVE | Noted: 2017-11-03

## 2017-11-03 PROBLEM — L30.9 DERMATITIS: Status: ACTIVE | Noted: 2017-11-03

## 2017-11-03 PROBLEM — I83.93 VARICOSE VEINS OF LEGS: Status: ACTIVE | Noted: 2017-11-03

## 2017-11-03 PROBLEM — I16.1 HYPERTENSIVE EMERGENCY: Status: ACTIVE | Noted: 2017-11-03

## 2017-11-03 PROBLEM — D50.9 IRON DEFICIENCY ANEMIA: Status: ACTIVE | Noted: 2017-11-03

## 2017-11-03 PROBLEM — R73.03 PREDIABETES: Status: ACTIVE | Noted: 2017-11-03

## 2017-11-03 LAB
GLUCOSE BLD-MCNC: 109 MG/DL (ref 75–110)
GLUCOSE BLD-MCNC: 118 MG/DL (ref 75–110)

## 2017-11-03 PROCEDURE — 97116 GAIT TRAINING THERAPY: CPT

## 2017-11-03 PROCEDURE — 6360000002 HC RX W HCPCS: Performed by: INTERNAL MEDICINE

## 2017-11-03 PROCEDURE — 2580000003 HC RX 258: Performed by: INTERNAL MEDICINE

## 2017-11-03 PROCEDURE — 6370000000 HC RX 637 (ALT 250 FOR IP): Performed by: SPECIALIST

## 2017-11-03 PROCEDURE — 97161 PT EVAL LOW COMPLEX 20 MIN: CPT

## 2017-11-03 PROCEDURE — G8978 MOBILITY CURRENT STATUS: HCPCS

## 2017-11-03 PROCEDURE — G8989 SELF CARE D/C STATUS: HCPCS

## 2017-11-03 PROCEDURE — 97530 THERAPEUTIC ACTIVITIES: CPT

## 2017-11-03 PROCEDURE — 6370000000 HC RX 637 (ALT 250 FOR IP): Performed by: INTERNAL MEDICINE

## 2017-11-03 PROCEDURE — G8988 SELF CARE GOAL STATUS: HCPCS

## 2017-11-03 PROCEDURE — 6370000000 HC RX 637 (ALT 250 FOR IP): Performed by: PSYCHIATRY & NEUROLOGY

## 2017-11-03 PROCEDURE — 82947 ASSAY GLUCOSE BLOOD QUANT: CPT

## 2017-11-03 PROCEDURE — G8979 MOBILITY GOAL STATUS: HCPCS

## 2017-11-03 PROCEDURE — 97165 OT EVAL LOW COMPLEX 30 MIN: CPT

## 2017-11-03 PROCEDURE — G8987 SELF CARE CURRENT STATUS: HCPCS

## 2017-11-03 RX ORDER — AMLODIPINE BESYLATE 10 MG/1
10 TABLET ORAL 2 TIMES DAILY
Qty: 60 TABLET | Refills: 0 | Status: SHIPPED | OUTPATIENT
Start: 2017-11-03

## 2017-11-03 RX ORDER — DOCUSATE SODIUM 100 MG/1
100 CAPSULE, LIQUID FILLED ORAL DAILY
Qty: 30 CAPSULE | Refills: 0 | Status: SHIPPED | OUTPATIENT
Start: 2017-11-03

## 2017-11-03 RX ORDER — HYDRALAZINE HYDROCHLORIDE 100 MG/1
100 TABLET, FILM COATED ORAL EVERY 8 HOURS SCHEDULED
Qty: 90 TABLET | Refills: 0 | Status: SHIPPED | OUTPATIENT
Start: 2017-11-03

## 2017-11-03 RX ORDER — CARVEDILOL 25 MG/1
25 TABLET ORAL 2 TIMES DAILY WITH MEALS
Qty: 60 TABLET | Refills: 0 | Status: SHIPPED | OUTPATIENT
Start: 2017-11-03

## 2017-11-03 RX ORDER — ARIPIPRAZOLE 10 MG/1
10 TABLET ORAL DAILY
Qty: 20 TABLET | Refills: 0 | Status: SHIPPED | OUTPATIENT
Start: 2017-11-04

## 2017-11-03 RX ORDER — LANOLIN ALCOHOL/MO/W.PET/CERES
325 CREAM (GRAM) TOPICAL 2 TIMES DAILY
Qty: 60 TABLET | Refills: 0 | Status: SHIPPED | OUTPATIENT
Start: 2017-11-03

## 2017-11-03 RX ADMIN — CARVEDILOL 25 MG: 25 TABLET, FILM COATED ORAL at 08:47

## 2017-11-03 RX ADMIN — HYDRALAZINE HYDROCHLORIDE 100 MG: 50 TABLET, FILM COATED ORAL at 05:34

## 2017-11-03 RX ADMIN — ARIPIPRAZOLE 10 MG: 5 TABLET ORAL at 08:47

## 2017-11-03 RX ADMIN — HEPARIN SODIUM 5000 UNITS: 5000 INJECTION, SOLUTION INTRAVENOUS; SUBCUTANEOUS at 13:27

## 2017-11-03 RX ADMIN — ONDANSETRON 4 MG: 2 INJECTION INTRAMUSCULAR; INTRAVENOUS at 07:56

## 2017-11-03 RX ADMIN — BISACODYL 10 MG: 10 SUPPOSITORY RECTAL at 00:21

## 2017-11-03 RX ADMIN — HEPARIN SODIUM 5000 UNITS: 5000 INJECTION, SOLUTION INTRAVENOUS; SUBCUTANEOUS at 05:35

## 2017-11-03 RX ADMIN — AMLODIPINE BESYLATE 10 MG: 10 TABLET ORAL at 08:47

## 2017-11-03 RX ADMIN — HYDRALAZINE HYDROCHLORIDE 100 MG: 50 TABLET, FILM COATED ORAL at 13:27

## 2017-11-03 RX ADMIN — Medication 10 ML: at 08:48

## 2017-11-03 NOTE — PLAN OF CARE
Problem: Falls - Risk of  Goal: Absence of falls  Outcome: Ongoing  Patient remains free of falls this shift. Patient bed is in lowest position with wheels locked, side rails are up 2/4. Bedside table and call light are kept within reach of patient. Patient is able to ambulate in room, room is kept free of clutter. Patient is being monitored during hourly rounds, door remains open. Patient is reminded to wear non-skid footwear when getting OOB.      Problem: Cardiac Output - Decreased:  Goal: Hemodynamic stability will improve  Hemodynamic stability will improve   Outcome: Ongoing

## 2017-11-03 NOTE — PROGRESS NOTES
Occupational Therapy   Occupational Therapy Initial Assessment  Date: 11/3/2017   Patient Name: Marline Barger  MRN: [id number]     : 1970    Patient Diagnosis(es): The primary encounter diagnosis was Accelerated hypertension. A diagnosis of Acute renal failure, unspecified acute renal failure type [facility]) was also pertinent to this visit. has a past medical history of Bipolar 1 disorder ([address] and Hypertension. has no past surgical history on file. Restrictions       Subjective   General  Chart Reviewed: Yes  Patient assessed for rehabilitation services?: Yes  Family / Caregiver Present: No  Diagnosis: Pt. admitted for accelerated HTN. Subjective  Subjective: Pt. verbalized he's feeling \"extra tired. \"  General Comment  Comments: Writer consulted Maru Velez RN whom indicated pt. was medically stable for OT this date. RN states changes have been made to pt's psych meds. During OT eval it was noted that pt's responses to questions were unrelated to question. Pt. also presented very lethargic often times keeping his eyes closed when speaking. Pain Assessment  Patient Currently in Pain: Denies  Oxygen Therapy  SpO2: 99 %  O2 Device: None (Room air)  Social/Functional History  Social/Functional History  Lives With: Spouse (States he lives with Raina Dyer of his wives. \")  Type of Home: House  Home Layout: One level  Home Access: Stairs to enter with rails  Entrance Stairs - Number of Steps: Pt. states that 3 large steps were converted to 10 small steps by [employer] [address] Shower/Tub: Tub/Shower unit  [place] Toilet: Standard  Home Equipment: Crutches  ADL Assistance: Independent  Homemaking Assistance: Independent  Homemaking Responsibilities: Yes (basic meal prep)  Ambulation Assistance: Independent  Transfer Assistance: Independent  Active : Yes  Occupation: On disability  IADL Comments: Pt. states his wife performs most IADLs but he is able.        Objective   Vision:

## 2017-11-03 NOTE — PROGRESS NOTES
Nephrology Progress Note    Erie Rhymes    No primary care provider on file. Follow Up for (CC) : JONNA, HTN    ASSESSMENT        1. Acute Kidney Injury likely hemodynamic vs CKD from Chronic uncontrolled HTN  2. Hypokalemia  3. Hyperaldosteronism? 4. Obesity  5. Hypertension     Active Problems:    Hypertensive urgency    JONNA (acute kidney injury) ([facility]    Hypokalemia    Hyperglycemia    PRIMO (obstructive sleep apnea)    Obesity (BMI 35.0-39.9 without comorbidity)      PLAN     BP under reasonable control  Has elevated Aldosterone, but renin was not suppressed  Continue  Coreg, Norvasc, and hydralazine   Avoid Catapress due to non-compliance and rebound HTN risks. Renal doppler  Showed no FRANKI. Has IgA Elke chains on serum and Urine  Immunofixation. Hematology input appreciated  Avoid nephrotoxic drugs and IV contrast exposure. Ok To be discharged from nephrology prospective  Will need to f/up with nephrology in 3-4 weeks with pre visit BMP  Please do not hesitate to call with questions. SUBJECTIVE      Pt seen and examined at bed side. No complaints.   BP well controlled    Chief Complaint   Patient presents with    Other     leg wound bleeding        Patient Active Problem List   Diagnosis    Hypertensive urgency    JONNA (acute kidney injury) ([facility] [address] [facility]    Hypokalemia    Hyperglycemia    PRIMO (obstructive sleep apnea)    Obesity (BMI 35.0-39.9 without comorbidity)       CURRENT MEDICATIONS      Current Facility-Administered Medications   Medication Dose Route Frequency Provider Last Rate Last Dose    carvedilol (COREG) tablet 25 mg  25 mg Oral BID  Earnest Lennon MD   25 mg at 11/03/17 0847    hydrALAZINE (APRESOLINE) tablet 100 mg  100 mg Oral 3 times per day Yajaira Obregon MD   100 mg at 11/03/17 1327    bisacodyl (DULCOLAX) suppository 10 mg  10 mg Rectal Daily PRN Sosa Cordero MD   10 mg at 11/03/17 0021    ARIPiprazole (ABILIFY) tablet 10 mg  10 mg Oral Daily Kalen Villagran MD   10 mg at 11/03/17 0847    amLODIPine (NORVASC) tablet 10 mg  10 mg Oral BID Daniel Kendall MD   10 mg at 11/03/17 0847    miconazole (MICOTIN) 2 % powder   Topical 4x Daily Hayley Alba MD        sodium chloride flush 0.9 % injection 10 mL  10 mL Intravenous 2 times per day Hayley Alba MD   10 mL at 11/03/17 0848    sodium chloride flush 0.9 % injection 10 mL  10 mL Intravenous PRN Hayley Alba MD        acetaminophen (TYLENOL) tablet 650 mg  650 mg Oral Q4H PRN Hayley Alba MD   650 mg at 10/29/17 2121    glucose (GLUTOSE) 40 % oral gel 15 g  15 g Oral PRN Hayley Alba MD        dextrose 50 % solution 12.5 g  12.5 g Intravenous PRN Hayley Alba MD        glucagon (rDNA) injection 1 mg  1 mg Intramuscular PRN Hayley Alba MD        dextrose 5 % solution  100 mL/hr Intravenous PRN Hayley Alba MD        insulin lispro (HUMALOG) injection vial 0-6 Units  0-6 Units Subcutaneous TID WC Hayley Alba MD   1 Units at 10/31/17 1839    insulin lispro (HUMALOG) injection vial 0-3 Units  0-3 Units Subcutaneous Nightly Hayley Alba MD   1 Units at 11/02/17 2124    heparin (porcine) injection 5,000 Units  5,000 Units Subcutaneous 3 times per day Hayley Alba MD   5,000 Units at 11/03/17 1327    ondansetron (ZOFRAN) injection 4 mg  4 mg Intravenous Q6H PRN Hayley Alba MD   4 mg at 11/03/17 0756       REVIEW OF SYSTEMS     All other ROS is negative. OBJECTIVE      Vitals:    11/03/17 1515   BP: (!) 137/92   Pulse: 68   Resp: 20   Temp: 97.4 °F (36.3 °C)   SpO2: 98%     Wt Readings from Last 3 Encounters:   10/29/17 300 lb (136.1 kg)     I/O last 3 completed shifts: In: 280 [P.O.:280]  Out: 450 [Urine:450]  Body mass index is 39.58 kg/m². PHYSICAL EXAM      GENERAL APPEARANCE:Awake, alert, in no acute distress  SKIN: warm and dry, no rash or erythema  EYES: conjunctivae normal and sclera anicteric  NECK:  JVD Absent.    PULMONARY: Symmetrical and CTA BL.  CADRDIOVASCULAR: S1 and S2 audible. ABDOMEN: soft nontender, bowel sounds present. EXTREMITIES: no cyanosis, clubbing or edema    LABS      Data:    CBC:   Recent Labs      11/01/17 0452 11/02/17 0453   WBC  14.9*  12.0*   HGB  9.4*  10.0*   HCT  28.6*  31.0*   MCV  84.9  83.9   PLT  294  306     BMP:    Recent Labs      11/01/17 0452 11/02/17 0453   NA  139  139   K  4.1  4.7   CL  101  102   CO2  25  25   BUN  26*  25*   CREATININE  1.90*  1.79*   GLUCOSE  125*  118*     CMP:   Recent Labs      11/01/17 0452 11/02/17 0453   NA  139  139   K  4.1  4.7   CL  101  102   CO2  25  25   BUN  26*  25*   CREATININE  1.90*  1.79*   GLUCOSE  125*  118*   CALCIUM  8.6  8.8       Phosphorus:    No results for input(s): PHOS in the last 72 hours. Ionized Calcium: No results for input(s): IONCA in the last 72 hours. Magnesium:   No results for input(s): MG in the last 72 hours. Albumin: No results for input(s): LABALBU in the last 72 hours. URINALYSIS/URINE CHEMISTRIES      URINE SODIUM:    Lab Results   Component Value Date    DENA 32 10/29/2017      URINE OSMOLARITY:    Lab Results   Component Value Date    OSMOU 487 10/29/2017     URINE CREATININE:    Lab Results   Component Value Date    LABCREA 92.9 10/29/2017     URINE EOSINOPHILS: No components found for: EOSU  URINALYSIS:  U/A:     RADIOLOGY      No results found for this or any previous visit. [address] [facility] [facility].   Ph: [phone]

## 2017-11-03 NOTE — PROGRESS NOTES
[facility]  Attending Progress Note    S: Denies complaints    O: in bed, no eye contact, flat affect    BP (!) 148/88   Pulse 67   Temp 97.5 °F (36.4 °C) (Oral)   Resp 20   Ht 6' 1\" (1.854 m)   Wt 300 lb (136.1 kg)   SpO2 100%   BMI 39.58 kg/m²     Intake/Output Summary (Last 24 hours) at 11/03/17 1110  Last data filed at 11/02/17 1800   Gross per 24 hour   Intake              560 ml   Output              450 ml   Net              110 ml       CONSTITUTIONAL: in bed, no distress  LUNGS:  No increased work of breathing, good air exchange, clear to auscultation bilaterally, no crackles or wheezing  CARDIOVASCULAR:  Normal apical impulse, regular rate and rhythm, normal S1 and S2, no S3 or S4, and no murmur noted  ABDOMEN:  No scars, normal bowel sounds, soft, non-distended, non-tender, no masses palpated, no hepatosplenomegally  MUSCULOSKELETAL:  +2 BLE edema  NEUROLOGIC: flat affect    Labs:  Lab Results   Component Value Date    WBC 12.0 11/02/2017    HGB 10.0 11/02/2017     11/02/2017     Lab Results   Component Value Date     11/02/2017    K 4.7 11/02/2017    CO2 25 11/02/2017    BUN 25 11/02/2017    CREATININE 1.79 11/02/2017       Lab Results   Component Value Date    MG 2.1 10/30/2017         Scheduled Meds:   carvedilol  25 mg Oral BID     hydrALAZINE  100 mg Oral 3 times per day    ARIPiprazole  10 mg Oral Daily    amLODIPine  10 mg Oral BID    miconazole   Topical 4x Daily    sodium chloride flush  10 mL Intravenous 2 times per day    insulin lispro  0-6 Units Subcutaneous TID     insulin lispro  0-3 Units Subcutaneous Nightly    heparin (porcine)  5,000 Units Subcutaneous 3 times per day     Continuous Infusions:   dextrose       PRN Meds:.bisacodyl, sodium chloride flush, acetaminophen, glucose, dextrose, glucagon (rDNA), dextrose, ondansetron    A:    1. Hypertensive urgency  2. Left ventricular hypertrophy  3.  Acute renal failure,

## 2017-11-03 NOTE — DISCHARGE SUMMARY
Physician Discharge Summary     Patient ID:  Adriana Niño  [id number]  6 y.o.  1970    Admit date: 10/29/2017    Discharge date and time:  11/3/2017    Admission Diagnoses:   Patient Active Problem List   Diagnosis    Hypertensive urgency    J[facility]NA [facility]) [facility] [address]    Hypokalemia    PRIMO (obstructive sleep apnea)    Obesity (BMI 35.0-39.9 without comorbidity)    Prediabetes    Bipolar 1 disorder (HCC)    Hypertensive emergency    Iron deficiency anemia    Varicose veins of legs    Dermatitis       Discharge Diagnoses: Hypertensive emergency  ckd3  Bipolar disorder  preDiabetes  Hypokalemia   Obesity BMI 35 to 39.9  Obstructive sleep apnea  Iron deficiencyAnemia  Dermatitis  Varicose veins leg bleeding stopped  MGUS  Hyperaldosteronism  LVOT  Consults: cardiology, pulmonary/intensive care, nephrology, hematology/oncology and psychiatry and vascular    Procedures: None    Hospital Course: Patient admitted with severe unrelenting hypertension with a bleeding varicose veins in the legs patient was admitted to the ICU on IV Cardene has finished her medication patient was not taken his medication for a couple years history of bipolar disorder patient was not acting normal psychiatry consultation was obtained and Abilify was started hypokalemia which received potassium supplements no major complications during his stay his kidney function did not improve most likely it is a CK D3 from the hypertension being treated    Discharge Exam:  See progress note from today    Disposition: home  Stable improved  Patient Instructions:   Current Discharge Medication List      START taking these medications    Details   hydrALAZINE (APRESOLINE) 100 MG tablet Take 1 tablet by mouth every 8 hours  Qty: 90 tablet, Refills: 0      ARIPiprazole (ABILIFY) 10 MG tablet Take 1 tablet by mouth daily  Qty: 20 tablet, Refills: 0      carvedilol (COREG) 25 MG tablet Take 1 tablet by mouth 2 times daily (with meals)  Qty: 60

## 2017-11-03 NOTE — PROGRESS NOTES
During hourly rounds patient was observed to be sitting at side of bed. Writer asked patient if he needed anything, patient requested suppository c/o constipation. Suppository administered as ordered.

## 2017-11-03 NOTE — PROGRESS NOTES
Pt refuses to stay in bed despite bed alarm and keep telemetry alarms on. Keeps stating that he is dizzy but refuses to keep bed alarm on. Writer stated that we were concered about him falling if he was feeling dizzy and walking around the room. States his understanding, but still gets out of bed by himself. Will continue to monitor.

## 2017-11-03 NOTE — PROGRESS NOTES
WFL  Bed mobility  Rolling to Right: Independent  Supine to Sit: Independent  Sit to Supine: Independent  Scooting: Independent  Transfers  Sit to Stand: Independent  Stand to sit: Independent  Bed to Chair: Independent  Ambulation  Ambulation?: Yes  Ambulation 1  Surface: level tile  Device: No Device  Quality of Gait: normal gait/ balance  Distance: 100 ft. Comments: Picked object off floor with normal balance     Balance  Posture: Good  Sitting - Static: Good  Sitting - Dynamic: Good  Standing - Static: Good  Standing - Dynamic: Good        Assessment   Assessment: No PT needed at this time as pt. is independent with gait/ balance/ functional mobility  Prognosis: Excellent  No Skilled PT: Independent with functional mobility   REQUIRES PT FOLLOW UP: No  Activity Tolerance  Activity Tolerance: Patient Tolerated treatment well     Discharge Recommendations:  Home independently      Plan   Safety Devices  Type of devices: Left in chair, Nurse notified    G-Code  PT G-Codes  Functional Assessment Tool Used: [place] [facility]  Score: 28  Functional Limitation: Mobility: Walking and moving around  Mobility: Walking and Moving Around Current Status (): 0 percent impaired, limited or restricted  Mobility: Walking and Moving Around Goal Status (): 0 percent impaired, limited or restricted    Goals  Patient Goals   Patient goals : d/C PT due to indep.         Therapy Time   Individual Concurrent Group Co-treatment   Time In [facility]         Time Out 0943         Minutes 18                 KAM RESE, PT

## 2017-11-03 NOTE — PROGRESS NOTES
Follow-up hypertensive urgency  No much better blood pressure much better off the IV Cardene on oral meds review of system  No fever no chills no GI/ complaints no cardiopulmonary complaints no TIA bleeding no headache no polyuria nor polydipsia no hypoglycemia no sore throat  Physical exam vitals stable  Eyes no pallor or jaundice  Heart regular rate and rhythm without gallop  Abdomen soft plus bowel sounds without any tenderness  Lungs air entry equal and clear to auscultation and percussion  Extremities trace edema good pulses no Homans sign varicose veins  Neuro alert and oriented ×3 with no focal deficit  Assessment and plan  Hypertensive urgency resolved  Hypertension controlled  Acute kidney injury versus CK D3  Normocytic anemia  Morbid obesity secondary to excess calories  Bipolar disorder  LVOT  Meds labs reviewed consultants notes reviewed and physical therapy occupational therapy transferred to progressive uremia , see orders

## 2017-11-06 LAB
RENIN ACTIVITY: 6.5 NG/ML/HR
RENIN COMMENT: NORMAL

## 2018-09-04 ENCOUNTER — HOSPITAL ENCOUNTER (OUTPATIENT)
Age: 48
Setting detail: SPECIMEN
Discharge: HOME | End: 2018-09-04
Payer: MEDICARE

## 2018-09-04 LAB
ANION GAP SERPL CALCULATED.3IONS-SCNC: 15 MMOL/L (ref 9–17)
BUN BLDV-MCNC: 34 MG/DL (ref 6–20)
BUN/CREAT BLD: 15 (ref 9–20)
CALCIUM SERPL-MCNC: 8.6 MG/DL (ref 8.6–10.4)
CHLORIDE BLD-SCNC: 99 MMOL/L (ref 98–107)
CO2: 20 MMOL/L (ref 20–31)
CREAT SERPL-MCNC: 2.28 MG/DL (ref 0.7–1.2)
GFR AFRICAN AMERICAN: 38 ML/MIN
GFR NON-AFRICAN AMERICAN: 31 ML/MIN
GFR SERPL CREATININE-BSD FRML MDRD: ABNORMAL ML/MIN/{1.73_M2}
GFR SERPL CREATININE-BSD FRML MDRD: ABNORMAL ML/MIN/{1.73_M2}
GLUCOSE BLD-MCNC: 75 MG/DL (ref 70–99)
HCT VFR BLD CALC: 35.7 % (ref 41–53)
HEMOGLOBIN: 11.4 G/DL (ref 13.5–17.5)
INR BLD: 2.5
MCH RBC QN AUTO: 26 PG (ref 26–34)
MCHC RBC AUTO-ENTMCNC: 32 G/DL (ref 31–37)
MCV RBC AUTO: 81.4 FL (ref 80–100)
NRBC AUTOMATED: ABNORMAL PER 100 WBC
PDW BLD-RTO: 19 % (ref 11.5–14.5)
PLATELET # BLD: 176 K/UL (ref 130–400)
PMV BLD AUTO: 8.7 FL (ref 6–12)
POTASSIUM SERPL-SCNC: 4.5 MMOL/L (ref 3.7–5.3)
PROTHROMBIN TIME: 24.9 SEC (ref 9.7–11.6)
RBC # BLD: 4.39 M/UL (ref 4.5–5.9)
SODIUM BLD-SCNC: 134 MMOL/L (ref 135–144)
WBC # BLD: 8.7 K/UL (ref 3.5–11)

## 2018-09-04 PROCEDURE — 80048 BASIC METABOLIC PNL TOTAL CA: CPT

## 2018-09-04 PROCEDURE — P9603 ONE-WAY ALLOW PRORATED MILES: HCPCS

## 2018-09-04 PROCEDURE — 36415 COLL VENOUS BLD VENIPUNCTURE: CPT

## 2018-09-04 PROCEDURE — 85027 COMPLETE CBC AUTOMATED: CPT

## 2018-09-04 PROCEDURE — 85610 PROTHROMBIN TIME: CPT

## 2018-09-06 ENCOUNTER — HOSPITAL ENCOUNTER (OUTPATIENT)
Age: 48
Setting detail: SPECIMEN
Discharge: HOME | End: 2018-09-06
Payer: MEDICARE

## 2018-09-06 LAB
INR BLD: 2.3
PROTHROMBIN TIME: 22.6 SEC (ref 9.7–11.6)

## 2018-09-06 PROCEDURE — P9603 ONE-WAY ALLOW PRORATED MILES: HCPCS

## 2018-09-06 PROCEDURE — 36415 COLL VENOUS BLD VENIPUNCTURE: CPT

## 2018-09-06 PROCEDURE — 85610 PROTHROMBIN TIME: CPT

## 2018-09-10 ENCOUNTER — HOSPITAL ENCOUNTER (OUTPATIENT)
Age: 48
Setting detail: SPECIMEN
Discharge: HOME | End: 2018-09-10
Payer: MEDICARE

## 2018-09-10 LAB
ANION GAP SERPL CALCULATED.3IONS-SCNC: 12 MMOL/L (ref 9–17)
BUN BLDV-MCNC: 36 MG/DL (ref 6–20)
BUN/CREAT BLD: 19 (ref 9–20)
CALCIUM SERPL-MCNC: 9.1 MG/DL (ref 8.6–10.4)
CHLORIDE BLD-SCNC: 104 MMOL/L (ref 98–107)
CO2: 23 MMOL/L (ref 20–31)
CREAT SERPL-MCNC: 1.9 MG/DL (ref 0.7–1.2)
GFR AFRICAN AMERICAN: 47 ML/MIN
GFR NON-AFRICAN AMERICAN: 39 ML/MIN
GFR SERPL CREATININE-BSD FRML MDRD: ABNORMAL ML/MIN/{1.73_M2}
GFR SERPL CREATININE-BSD FRML MDRD: ABNORMAL ML/MIN/{1.73_M2}
GLUCOSE BLD-MCNC: 101 MG/DL (ref 70–99)
HCT VFR BLD CALC: 36 % (ref 41–53)
HEMOGLOBIN: 11.6 G/DL (ref 13.5–17.5)
INR BLD: 1.8
MCH RBC QN AUTO: 26.1 PG (ref 26–34)
MCHC RBC AUTO-ENTMCNC: 32.2 G/DL (ref 31–37)
MCV RBC AUTO: 81 FL (ref 80–100)
NRBC AUTOMATED: ABNORMAL PER 100 WBC
PDW BLD-RTO: 18.9 % (ref 11.5–14.5)
PLATELET # BLD: 357 K/UL (ref 130–400)
PMV BLD AUTO: 7.7 FL (ref 6–12)
POTASSIUM SERPL-SCNC: 4.2 MMOL/L (ref 3.7–5.3)
PROTHROMBIN TIME: 18.1 SEC (ref 9.7–11.6)
RBC # BLD: 4.44 M/UL (ref 4.5–5.9)
SODIUM BLD-SCNC: 139 MMOL/L (ref 135–144)
WBC # BLD: 8.2 K/UL (ref 3.5–11)

## 2018-09-10 PROCEDURE — 85027 COMPLETE CBC AUTOMATED: CPT

## 2018-09-10 PROCEDURE — 80048 BASIC METABOLIC PNL TOTAL CA: CPT

## 2018-09-10 PROCEDURE — 85610 PROTHROMBIN TIME: CPT

## 2018-09-10 PROCEDURE — P9603 ONE-WAY ALLOW PRORATED MILES: HCPCS

## 2018-09-10 PROCEDURE — 36415 COLL VENOUS BLD VENIPUNCTURE: CPT

## 2018-09-13 ENCOUNTER — HOSPITAL ENCOUNTER (OUTPATIENT)
Age: 48
Setting detail: SPECIMEN
Discharge: HOME | End: 2018-09-13
Payer: MEDICARE

## 2018-09-13 LAB
-: ABNORMAL
AMORPHOUS: ABNORMAL
BACTERIA: ABNORMAL
BILIRUBIN URINE: NEGATIVE
CASTS UA: ABNORMAL /LPF (ref 0–2)
COLOR: YELLOW
COMMENT UA: ABNORMAL
CREATININE URINE: 141.5 MG/DL (ref 39–259)
CRYSTALS, UA: ABNORMAL /HPF
EPITHELIAL CELLS UA: ABNORMAL /HPF (ref 0–5)
GLUCOSE URINE: NEGATIVE
INR BLD: 1.5
KETONES, URINE: NEGATIVE
LEUKOCYTE ESTERASE, URINE: ABNORMAL
MICROALBUMIN/CREAT 24H UR: 53 MG/L
MICROALBUMIN/CREAT UR-RTO: 37 MCG/MG CREAT
MUCUS: ABNORMAL
NITRITE, URINE: POSITIVE
OTHER OBSERVATIONS UA: ABNORMAL
PH UA: 5 (ref 5–8)
PROTEIN UA: ABNORMAL
PROTHROMBIN TIME: 15 SEC (ref 9.7–11.6)
RBC UA: ABNORMAL /HPF (ref 0–2)
RENAL EPITHELIAL, UA: ABNORMAL /HPF
SPECIFIC GRAVITY UA: 1.02 (ref 1–1.03)
TRICHOMONAS: ABNORMAL
TURBIDITY: ABNORMAL
URINE HGB: ABNORMAL
UROBILINOGEN, URINE: NORMAL
WBC UA: ABNORMAL /HPF (ref 0–5)
YEAST: ABNORMAL

## 2018-09-13 PROCEDURE — 82043 UR ALBUMIN QUANTITATIVE: CPT

## 2018-09-13 PROCEDURE — P9603 ONE-WAY ALLOW PRORATED MILES: HCPCS

## 2018-09-13 PROCEDURE — 81001 URINALYSIS AUTO W/SCOPE: CPT

## 2018-09-13 PROCEDURE — 87086 URINE CULTURE/COLONY COUNT: CPT

## 2018-09-13 PROCEDURE — 82570 ASSAY OF URINE CREATININE: CPT

## 2018-09-13 PROCEDURE — 36415 COLL VENOUS BLD VENIPUNCTURE: CPT

## 2018-09-13 PROCEDURE — 85610 PROTHROMBIN TIME: CPT

## 2018-09-14 LAB
CULTURE: NORMAL
Lab: NORMAL
SPECIMEN DESCRIPTION: NORMAL
STATUS: NORMAL

## 2018-09-17 ENCOUNTER — HOSPITAL ENCOUNTER (OUTPATIENT)
Age: 48
Setting detail: SPECIMEN
Discharge: HOME | End: 2018-09-17
Payer: MEDICARE

## 2018-09-17 LAB
ABSOLUTE EOS #: 0.29 K/UL (ref 0–0.44)
ABSOLUTE IMMATURE GRANULOCYTE: 0.04 K/UL (ref 0–0.3)
ABSOLUTE LYMPH #: 1.83 K/UL (ref 1.1–3.7)
ABSOLUTE MONO #: 0.7 K/UL (ref 0.1–1.2)
ANION GAP SERPL CALCULATED.3IONS-SCNC: 15 MMOL/L (ref 9–17)
BASOPHILS # BLD: 0 % (ref 0–2)
BASOPHILS ABSOLUTE: 0.04 K/UL (ref 0–0.2)
BUN BLDV-MCNC: 22 MG/DL (ref 6–20)
BUN/CREAT BLD: 12 (ref 9–20)
CALCIUM SERPL-MCNC: 9.3 MG/DL (ref 8.6–10.4)
CHLORIDE BLD-SCNC: 103 MMOL/L (ref 98–107)
CO2: 26 MMOL/L (ref 20–31)
CREAT SERPL-MCNC: 1.84 MG/DL (ref 0.7–1.2)
DIFFERENTIAL TYPE: ABNORMAL
EOSINOPHILS RELATIVE PERCENT: 3 % (ref 1–4)
GFR AFRICAN AMERICAN: 48 ML/MIN
GFR NON-AFRICAN AMERICAN: 40 ML/MIN
GFR SERPL CREATININE-BSD FRML MDRD: ABNORMAL ML/MIN/{1.73_M2}
GFR SERPL CREATININE-BSD FRML MDRD: ABNORMAL ML/MIN/{1.73_M2}
GLUCOSE BLD-MCNC: 79 MG/DL (ref 70–99)
HCT VFR BLD CALC: 40 % (ref 40.7–50.3)
HEMOGLOBIN: 11.7 G/DL (ref 13–17)
IMMATURE GRANULOCYTES: 0 %
INR BLD: 2.2
LYMPHOCYTES # BLD: 19 % (ref 24–43)
MAGNESIUM: 2.1 MG/DL (ref 1.6–2.6)
MCH RBC QN AUTO: 25.2 PG (ref 25.2–33.5)
MCHC RBC AUTO-ENTMCNC: 29.3 G/DL (ref 28.4–34.8)
MCV RBC AUTO: 86.2 FL (ref 82.6–102.9)
MONOCYTES # BLD: 7 % (ref 3–12)
NRBC AUTOMATED: 0 PER 100 WBC
PDW BLD-RTO: 17.4 % (ref 11.8–14.4)
PHOSPHORUS: 4 MG/DL (ref 2.5–4.5)
PLATELET # BLD: 252 K/UL (ref 138–453)
PLATELET ESTIMATE: ABNORMAL
PMV BLD AUTO: 10.3 FL (ref 8.1–13.5)
POTASSIUM SERPL-SCNC: 4 MMOL/L (ref 3.7–5.3)
PROTHROMBIN TIME: 21.8 SEC (ref 9.7–11.6)
RBC # BLD: 4.64 M/UL (ref 4.21–5.77)
RBC # BLD: ABNORMAL 10*6/UL
SEG NEUTROPHILS: 70 % (ref 36–65)
SEGMENTED NEUTROPHILS ABSOLUTE COUNT: 6.76 K/UL (ref 1.5–8.1)
SODIUM BLD-SCNC: 144 MMOL/L (ref 135–144)
URIC ACID: 6.7 MG/DL (ref 3.4–7)
VITAMIN D 25-HYDROXY: 20.5 NG/ML (ref 30–100)
WBC # BLD: 9.7 K/UL (ref 3.5–11.3)
WBC # BLD: ABNORMAL 10*3/UL

## 2018-09-17 PROCEDURE — 83735 ASSAY OF MAGNESIUM: CPT

## 2018-09-17 PROCEDURE — 36415 COLL VENOUS BLD VENIPUNCTURE: CPT

## 2018-09-17 PROCEDURE — 84100 ASSAY OF PHOSPHORUS: CPT

## 2018-09-17 PROCEDURE — 84550 ASSAY OF BLOOD/URIC ACID: CPT

## 2018-09-17 PROCEDURE — 80048 BASIC METABOLIC PNL TOTAL CA: CPT

## 2018-09-17 PROCEDURE — 85610 PROTHROMBIN TIME: CPT

## 2018-09-17 PROCEDURE — 85025 COMPLETE CBC W/AUTO DIFF WBC: CPT

## 2018-09-17 PROCEDURE — 82306 VITAMIN D 25 HYDROXY: CPT

## 2018-09-20 ENCOUNTER — HOSPITAL ENCOUNTER (OUTPATIENT)
Age: 48
Setting detail: SPECIMEN
Discharge: HOME | End: 2018-09-20
Payer: MEDICARE

## 2018-09-20 LAB
INR BLD: 2.9
PROTHROMBIN TIME: 28.5 SEC (ref 9.7–11.6)

## 2018-09-20 PROCEDURE — 36415 COLL VENOUS BLD VENIPUNCTURE: CPT

## 2018-09-20 PROCEDURE — P9603 ONE-WAY ALLOW PRORATED MILES: HCPCS

## 2018-09-20 PROCEDURE — 85610 PROTHROMBIN TIME: CPT

## 2018-09-24 ENCOUNTER — HOSPITAL ENCOUNTER (OUTPATIENT)
Age: 48
Setting detail: SPECIMEN
Discharge: HOME | End: 2018-09-24
Payer: MEDICARE

## 2018-09-24 LAB
ANION GAP SERPL CALCULATED.3IONS-SCNC: 14 MMOL/L (ref 9–17)
BUN BLDV-MCNC: 22 MG/DL (ref 6–20)
BUN/CREAT BLD: 12 (ref 9–20)
CALCIUM SERPL-MCNC: 8.9 MG/DL (ref 8.6–10.4)
CHLORIDE BLD-SCNC: 104 MMOL/L (ref 98–107)
CO2: 25 MMOL/L (ref 20–31)
CREAT SERPL-MCNC: 1.78 MG/DL (ref 0.7–1.2)
GFR AFRICAN AMERICAN: 50 ML/MIN
GFR NON-AFRICAN AMERICAN: 42 ML/MIN
GFR SERPL CREATININE-BSD FRML MDRD: ABNORMAL ML/MIN/{1.73_M2}
GFR SERPL CREATININE-BSD FRML MDRD: ABNORMAL ML/MIN/{1.73_M2}
GLUCOSE BLD-MCNC: 84 MG/DL (ref 70–99)
INR BLD: 3
POTASSIUM SERPL-SCNC: 3.8 MMOL/L (ref 3.7–5.3)
PROTHROMBIN TIME: 29.2 SEC (ref 9.7–11.6)
SODIUM BLD-SCNC: 143 MMOL/L (ref 135–144)

## 2018-09-24 PROCEDURE — 85610 PROTHROMBIN TIME: CPT

## 2018-09-24 PROCEDURE — P9603 ONE-WAY ALLOW PRORATED MILES: HCPCS

## 2018-09-24 PROCEDURE — 36415 COLL VENOUS BLD VENIPUNCTURE: CPT

## 2018-09-24 PROCEDURE — 80048 BASIC METABOLIC PNL TOTAL CA: CPT

## 2018-09-27 ENCOUNTER — HOSPITAL ENCOUNTER (OUTPATIENT)
Age: 48
Setting detail: SPECIMEN
Discharge: HOME | End: 2018-09-27
Payer: MEDICARE

## 2018-09-27 LAB
INR BLD: 3.4
PROTHROMBIN TIME: 32.7 SEC (ref 9.7–11.6)

## 2018-09-27 PROCEDURE — 36415 COLL VENOUS BLD VENIPUNCTURE: CPT

## 2018-09-27 PROCEDURE — P9603 ONE-WAY ALLOW PRORATED MILES: HCPCS

## 2018-09-27 PROCEDURE — 85610 PROTHROMBIN TIME: CPT

## 2019-03-19 ENCOUNTER — HOSPITAL ENCOUNTER (OUTPATIENT)
Age: 49
Setting detail: SPECIMEN
Discharge: HOME | End: 2019-03-19
Payer: MEDICARE

## 2019-03-19 LAB
INR BLD: 1.4
PROTHROMBIN TIME: 13.8 SEC (ref 9.7–11.6)

## 2019-03-19 PROCEDURE — 85610 PROTHROMBIN TIME: CPT

## 2019-03-19 PROCEDURE — P9603 ONE-WAY ALLOW PRORATED MILES: HCPCS

## 2019-03-19 PROCEDURE — 36415 COLL VENOUS BLD VENIPUNCTURE: CPT

## 2019-03-21 ENCOUNTER — HOSPITAL ENCOUNTER (OUTPATIENT)
Age: 49
Setting detail: SPECIMEN
Discharge: HOME | End: 2019-03-21
Payer: MEDICARE

## 2019-03-21 LAB
INR BLD: 1.3
PROTHROMBIN TIME: 13.4 SEC (ref 9.7–11.6)

## 2019-03-21 PROCEDURE — 36415 COLL VENOUS BLD VENIPUNCTURE: CPT

## 2019-03-21 PROCEDURE — 85610 PROTHROMBIN TIME: CPT

## 2019-03-21 PROCEDURE — P9603 ONE-WAY ALLOW PRORATED MILES: HCPCS

## 2019-03-25 ENCOUNTER — HOSPITAL ENCOUNTER (OUTPATIENT)
Age: 49
Setting detail: SPECIMEN
Discharge: HOME | End: 2019-03-25
Payer: MEDICARE

## 2019-03-25 LAB
INR BLD: 1.5
PROTHROMBIN TIME: 15.1 SEC (ref 9.7–11.6)

## 2019-03-25 PROCEDURE — 36415 COLL VENOUS BLD VENIPUNCTURE: CPT

## 2019-03-25 PROCEDURE — P9603 ONE-WAY ALLOW PRORATED MILES: HCPCS

## 2019-03-25 PROCEDURE — 85610 PROTHROMBIN TIME: CPT

## 2019-06-06 ENCOUNTER — HOSPITAL ENCOUNTER (OUTPATIENT)
Age: 49
Setting detail: SPECIMEN
Discharge: HOME | End: 2019-06-06
Payer: MEDICARE

## 2019-06-06 LAB
ANION GAP SERPL CALCULATED.3IONS-SCNC: 12 MMOL/L (ref 9–17)
BUN BLDV-MCNC: 23 MG/DL (ref 6–20)
BUN/CREAT BLD: ABNORMAL (ref 9–20)
CALCIUM SERPL-MCNC: 8.1 MG/DL (ref 8.6–10.4)
CHLORIDE BLD-SCNC: 103 MMOL/L (ref 98–107)
CO2: 24 MMOL/L (ref 20–31)
CREAT SERPL-MCNC: 1.5 MG/DL (ref 0.7–1.2)
GFR AFRICAN AMERICAN: >60 ML/MIN
GFR NON-AFRICAN AMERICAN: 50 ML/MIN
GFR SERPL CREATININE-BSD FRML MDRD: ABNORMAL ML/MIN/{1.73_M2}
GFR SERPL CREATININE-BSD FRML MDRD: ABNORMAL ML/MIN/{1.73_M2}
GLUCOSE BLD-MCNC: 96 MG/DL (ref 70–99)
HCT VFR BLD CALC: 37.7 % (ref 40.7–50.3)
HEMOGLOBIN: 11.4 G/DL (ref 13–17)
MCH RBC QN AUTO: 27.9 PG (ref 25.2–33.5)
MCHC RBC AUTO-ENTMCNC: 30.2 G/DL (ref 28.4–34.8)
MCV RBC AUTO: 92.2 FL (ref 82.6–102.9)
NRBC AUTOMATED: 0 PER 100 WBC
PDW BLD-RTO: 13.3 % (ref 11.8–14.4)
PLATELET # BLD: 270 K/UL (ref 138–453)
PMV BLD AUTO: 10.8 FL (ref 8.1–13.5)
POTASSIUM SERPL-SCNC: 3.9 MMOL/L (ref 3.7–5.3)
RBC # BLD: 4.09 M/UL (ref 4.21–5.77)
SODIUM BLD-SCNC: 139 MMOL/L (ref 135–144)
WBC # BLD: 11.1 K/UL (ref 3.5–11.3)

## 2019-06-06 PROCEDURE — 85027 COMPLETE CBC AUTOMATED: CPT

## 2019-06-06 PROCEDURE — 80048 BASIC METABOLIC PNL TOTAL CA: CPT

## 2019-06-06 PROCEDURE — 36415 COLL VENOUS BLD VENIPUNCTURE: CPT

## 2019-06-06 PROCEDURE — P9603 ONE-WAY ALLOW PRORATED MILES: HCPCS

## 2019-06-07 ENCOUNTER — HOSPITAL ENCOUNTER (OUTPATIENT)
Age: 49
Setting detail: SPECIMEN
Discharge: HOME | End: 2019-06-07
Payer: MEDICARE

## 2019-06-07 LAB
INR BLD: 1.4
PROTHROMBIN TIME: 14.5 SEC (ref 9.7–11.6)

## 2019-06-07 PROCEDURE — 85610 PROTHROMBIN TIME: CPT

## 2019-06-07 PROCEDURE — 36415 COLL VENOUS BLD VENIPUNCTURE: CPT

## 2019-06-07 PROCEDURE — P9603 ONE-WAY ALLOW PRORATED MILES: HCPCS

## 2019-06-10 ENCOUNTER — HOSPITAL ENCOUNTER (OUTPATIENT)
Age: 49
Setting detail: SPECIMEN
Discharge: HOME | End: 2019-06-10
Payer: MEDICARE

## 2019-06-10 LAB
INR BLD: 1.3
PROTHROMBIN TIME: 12.9 SEC (ref 9.7–11.6)

## 2019-06-10 PROCEDURE — 36415 COLL VENOUS BLD VENIPUNCTURE: CPT

## 2019-06-10 PROCEDURE — P9603 ONE-WAY ALLOW PRORATED MILES: HCPCS

## 2019-06-10 PROCEDURE — 85610 PROTHROMBIN TIME: CPT

## 2019-06-13 ENCOUNTER — HOSPITAL ENCOUNTER (OUTPATIENT)
Age: 49
Setting detail: SPECIMEN
Discharge: HOME | End: 2019-06-13
Payer: MEDICARE

## 2019-06-13 LAB
INR BLD: 1.3
PROTHROMBIN TIME: 13.4 SEC (ref 9.7–11.6)

## 2019-06-13 PROCEDURE — P9603 ONE-WAY ALLOW PRORATED MILES: HCPCS

## 2019-06-13 PROCEDURE — 85610 PROTHROMBIN TIME: CPT

## 2019-06-13 PROCEDURE — 36415 COLL VENOUS BLD VENIPUNCTURE: CPT

## 2019-06-17 ENCOUNTER — HOSPITAL ENCOUNTER (OUTPATIENT)
Age: 49
Setting detail: SPECIMEN
Discharge: HOME | End: 2019-06-17
Payer: MEDICARE

## 2019-06-17 LAB
INR BLD: 1.7
PROTHROMBIN TIME: 17.2 SEC (ref 9.7–11.6)

## 2019-06-17 PROCEDURE — P9603 ONE-WAY ALLOW PRORATED MILES: HCPCS

## 2019-06-17 PROCEDURE — 85610 PROTHROMBIN TIME: CPT

## 2019-06-17 PROCEDURE — 36415 COLL VENOUS BLD VENIPUNCTURE: CPT

## 2019-06-20 ENCOUNTER — HOSPITAL ENCOUNTER (OUTPATIENT)
Age: 49
Setting detail: SPECIMEN
Discharge: HOME | End: 2019-06-20
Payer: MEDICARE

## 2019-06-20 LAB
INR BLD: 2
PROTHROMBIN TIME: 20.2 SEC (ref 9.7–11.6)

## 2019-06-20 PROCEDURE — 85610 PROTHROMBIN TIME: CPT

## 2019-06-20 PROCEDURE — 36415 COLL VENOUS BLD VENIPUNCTURE: CPT

## 2019-06-20 PROCEDURE — P9603 ONE-WAY ALLOW PRORATED MILES: HCPCS

## 2019-06-24 ENCOUNTER — HOSPITAL ENCOUNTER (OUTPATIENT)
Age: 49
Setting detail: SPECIMEN
Discharge: HOME | End: 2019-06-24
Payer: MEDICARE

## 2019-06-24 LAB
INR BLD: 2.1
PROTHROMBIN TIME: 20.8 SEC (ref 9.7–11.6)

## 2019-06-24 PROCEDURE — 36415 COLL VENOUS BLD VENIPUNCTURE: CPT

## 2019-06-24 PROCEDURE — P9603 ONE-WAY ALLOW PRORATED MILES: HCPCS

## 2019-06-24 PROCEDURE — 85610 PROTHROMBIN TIME: CPT

## 2019-07-16 ENCOUNTER — HOSPITAL ENCOUNTER (OUTPATIENT)
Age: 49
Setting detail: SPECIMEN
Discharge: HOME | End: 2019-07-16
Payer: COMMERCIAL

## 2019-07-16 LAB
ABSOLUTE EOS #: 0.54 K/UL (ref 0–0.44)
ABSOLUTE IMMATURE GRANULOCYTE: 0.21 K/UL (ref 0–0.3)
ABSOLUTE LYMPH #: 1.76 K/UL (ref 1.1–3.7)
ABSOLUTE MONO #: 0.85 K/UL (ref 0.1–1.2)
BASOPHILS # BLD: 1 % (ref 0–2)
BASOPHILS ABSOLUTE: 0.1 K/UL (ref 0–0.2)
DIFFERENTIAL TYPE: ABNORMAL
EOSINOPHILS RELATIVE PERCENT: 5 % (ref 1–4)
HCT VFR BLD CALC: 39 % (ref 40.7–50.3)
HEMOGLOBIN: 12 G/DL (ref 13–17)
IMMATURE GRANULOCYTES: 2 %
INR BLD: 1.8
LYMPHOCYTES # BLD: 16 % (ref 24–43)
MCH RBC QN AUTO: 27.6 PG (ref 25.2–33.5)
MCHC RBC AUTO-ENTMCNC: 30.8 G/DL (ref 28.4–34.8)
MCV RBC AUTO: 89.9 FL (ref 82.6–102.9)
MONOCYTES # BLD: 8 % (ref 3–12)
NRBC AUTOMATED: 0 PER 100 WBC
PDW BLD-RTO: 14.1 % (ref 11.8–14.4)
PLATELET # BLD: 392 K/UL (ref 138–453)
PLATELET ESTIMATE: ABNORMAL
PMV BLD AUTO: 9.9 FL (ref 8.1–13.5)
PROTHROMBIN TIME: 18.5 SEC (ref 9.7–11.6)
RBC # BLD: 4.34 M/UL (ref 4.21–5.77)
RBC # BLD: ABNORMAL 10*6/UL
SEG NEUTROPHILS: 68 % (ref 36–65)
SEGMENTED NEUTROPHILS ABSOLUTE COUNT: 7.27 K/UL (ref 1.5–8.1)
WBC # BLD: 10.7 K/UL (ref 3.5–11.3)
WBC # BLD: ABNORMAL 10*3/UL

## 2019-07-16 PROCEDURE — 36415 COLL VENOUS BLD VENIPUNCTURE: CPT

## 2019-07-16 PROCEDURE — 85610 PROTHROMBIN TIME: CPT

## 2019-07-16 PROCEDURE — P9603 ONE-WAY ALLOW PRORATED MILES: HCPCS

## 2019-07-16 PROCEDURE — 85025 COMPLETE CBC W/AUTO DIFF WBC: CPT

## 2019-07-17 ENCOUNTER — HOSPITAL ENCOUNTER (OUTPATIENT)
Age: 49
Setting detail: SPECIMEN
Discharge: HOME | End: 2019-07-17
Payer: COMMERCIAL

## 2019-07-17 LAB
INR BLD: 1.9
PROTHROMBIN TIME: 19.3 SEC (ref 9.7–11.6)

## 2019-07-17 PROCEDURE — 36415 COLL VENOUS BLD VENIPUNCTURE: CPT

## 2019-07-17 PROCEDURE — 85610 PROTHROMBIN TIME: CPT

## 2019-07-17 PROCEDURE — P9603 ONE-WAY ALLOW PRORATED MILES: HCPCS

## 2019-07-18 ENCOUNTER — HOSPITAL ENCOUNTER (OUTPATIENT)
Age: 49
Setting detail: SPECIMEN
Discharge: HOME | End: 2019-07-18
Payer: COMMERCIAL

## 2019-07-18 LAB
INR BLD: 2
PROTHROMBIN TIME: 19.7 SEC (ref 9.7–11.6)

## 2019-07-18 PROCEDURE — 36415 COLL VENOUS BLD VENIPUNCTURE: CPT

## 2019-07-18 PROCEDURE — P9603 ONE-WAY ALLOW PRORATED MILES: HCPCS

## 2019-07-18 PROCEDURE — 85610 PROTHROMBIN TIME: CPT

## 2019-07-19 ENCOUNTER — HOSPITAL ENCOUNTER (OUTPATIENT)
Age: 49
Setting detail: SPECIMEN
Discharge: HOME | End: 2019-07-19
Payer: COMMERCIAL

## 2019-07-19 LAB
ANION GAP SERPL CALCULATED.3IONS-SCNC: 14 MMOL/L (ref 9–17)
BUN BLDV-MCNC: 21 MG/DL (ref 6–20)
BUN/CREAT BLD: ABNORMAL (ref 9–20)
C-REACTIVE PROTEIN: 12.2 MG/L (ref 0–5)
CALCIUM SERPL-MCNC: 9.2 MG/DL (ref 8.6–10.4)
CHLORIDE BLD-SCNC: 101 MMOL/L (ref 98–107)
CO2: 26 MMOL/L (ref 20–31)
CREAT SERPL-MCNC: 1.13 MG/DL (ref 0.7–1.2)
GFR AFRICAN AMERICAN: >60 ML/MIN
GFR NON-AFRICAN AMERICAN: >60 ML/MIN
GFR SERPL CREATININE-BSD FRML MDRD: ABNORMAL ML/MIN/{1.73_M2}
GFR SERPL CREATININE-BSD FRML MDRD: ABNORMAL ML/MIN/{1.73_M2}
GLUCOSE BLD-MCNC: 170 MG/DL (ref 70–99)
HCT VFR BLD CALC: 40 % (ref 40.7–50.3)
HEMOGLOBIN: 12.1 G/DL (ref 13–17)
INR BLD: 2.1
MCH RBC QN AUTO: 27.3 PG (ref 25.2–33.5)
MCHC RBC AUTO-ENTMCNC: 30.3 G/DL (ref 28.4–34.8)
MCV RBC AUTO: 90.1 FL (ref 82.6–102.9)
NRBC AUTOMATED: 0 PER 100 WBC
PARTIAL THROMBOPLASTIN TIME: 36.2 SEC (ref 23–31)
PDW BLD-RTO: 14.7 % (ref 11.8–14.4)
PLATELET # BLD: 373 K/UL (ref 138–453)
PMV BLD AUTO: 9.5 FL (ref 8.1–13.5)
POTASSIUM SERPL-SCNC: 3.9 MMOL/L (ref 3.7–5.3)
PROTHROMBIN TIME: 21.1 SEC (ref 9.7–11.6)
RBC # BLD: 4.44 M/UL (ref 4.21–5.77)
SEDIMENTATION RATE, ERYTHROCYTE: 33 MM (ref 0–10)
SODIUM BLD-SCNC: 141 MMOL/L (ref 135–144)
VANCOMYCIN TROUGH DATE LAST DOSE: NORMAL
VANCOMYCIN TROUGH DOSE AMOUNT: NORMAL
VANCOMYCIN TROUGH TIME LAST DOSE: NORMAL
VANCOMYCIN TROUGH: 14.3 UG/ML (ref 10–20)
WBC # BLD: 7.4 K/UL (ref 3.5–11.3)

## 2019-07-19 PROCEDURE — 85027 COMPLETE CBC AUTOMATED: CPT

## 2019-07-19 PROCEDURE — 85610 PROTHROMBIN TIME: CPT

## 2019-07-19 PROCEDURE — P9603 ONE-WAY ALLOW PRORATED MILES: HCPCS

## 2019-07-19 PROCEDURE — 80202 ASSAY OF VANCOMYCIN: CPT

## 2019-07-19 PROCEDURE — 85651 RBC SED RATE NONAUTOMATED: CPT

## 2019-07-19 PROCEDURE — 36415 COLL VENOUS BLD VENIPUNCTURE: CPT

## 2019-07-19 PROCEDURE — 85730 THROMBOPLASTIN TIME PARTIAL: CPT

## 2019-07-19 PROCEDURE — 80048 BASIC METABOLIC PNL TOTAL CA: CPT

## 2019-07-19 PROCEDURE — 86140 C-REACTIVE PROTEIN: CPT

## 2019-07-21 ENCOUNTER — HOSPITAL ENCOUNTER (OUTPATIENT)
Age: 49
Setting detail: SPECIMEN
Discharge: HOME | End: 2019-07-21
Payer: COMMERCIAL

## 2019-07-21 LAB
INR BLD: 2.3
PROTHROMBIN TIME: 22.7 SEC (ref 9.7–11.6)

## 2019-07-21 PROCEDURE — 36415 COLL VENOUS BLD VENIPUNCTURE: CPT

## 2019-07-21 PROCEDURE — P9603 ONE-WAY ALLOW PRORATED MILES: HCPCS

## 2019-07-21 PROCEDURE — 85610 PROTHROMBIN TIME: CPT

## 2019-07-22 ENCOUNTER — HOSPITAL ENCOUNTER (OUTPATIENT)
Age: 49
Setting detail: SPECIMEN
Discharge: HOME | End: 2019-07-22
Payer: COMMERCIAL

## 2019-07-22 LAB
INR BLD: 2
PROTHROMBIN TIME: 20.4 SEC (ref 9.7–11.6)

## 2019-07-22 PROCEDURE — 85610 PROTHROMBIN TIME: CPT

## 2019-07-22 PROCEDURE — P9603 ONE-WAY ALLOW PRORATED MILES: HCPCS

## 2019-07-22 PROCEDURE — 36415 COLL VENOUS BLD VENIPUNCTURE: CPT

## 2019-07-23 ENCOUNTER — HOSPITAL ENCOUNTER (OUTPATIENT)
Age: 49
Setting detail: SPECIMEN
Discharge: HOME | End: 2019-07-23
Payer: COMMERCIAL

## 2019-07-23 LAB
INR BLD: 1.7
PROTHROMBIN TIME: 17.5 SEC (ref 9.7–11.6)
VANCOMYCIN TROUGH DATE LAST DOSE: NORMAL
VANCOMYCIN TROUGH DOSE AMOUNT: NORMAL
VANCOMYCIN TROUGH TIME LAST DOSE: NORMAL
VANCOMYCIN TROUGH: 15.8 UG/ML (ref 10–20)

## 2019-07-23 PROCEDURE — 36415 COLL VENOUS BLD VENIPUNCTURE: CPT

## 2019-07-23 PROCEDURE — 80202 ASSAY OF VANCOMYCIN: CPT

## 2019-07-23 PROCEDURE — P9603 ONE-WAY ALLOW PRORATED MILES: HCPCS

## 2019-07-23 PROCEDURE — 85610 PROTHROMBIN TIME: CPT

## 2019-07-25 ENCOUNTER — HOSPITAL ENCOUNTER (OUTPATIENT)
Age: 49
Setting detail: SPECIMEN
Discharge: HOME | End: 2019-07-25
Payer: COMMERCIAL

## 2019-07-25 LAB
INR BLD: 1.5
PROTHROMBIN TIME: 15.2 SEC (ref 9.7–11.6)

## 2019-07-25 PROCEDURE — 85610 PROTHROMBIN TIME: CPT

## 2019-07-25 PROCEDURE — P9603 ONE-WAY ALLOW PRORATED MILES: HCPCS

## 2019-07-25 PROCEDURE — 36415 COLL VENOUS BLD VENIPUNCTURE: CPT

## 2019-07-26 ENCOUNTER — HOSPITAL ENCOUNTER (OUTPATIENT)
Age: 49
Setting detail: SPECIMEN
Discharge: HOME | End: 2019-07-26
Payer: COMMERCIAL

## 2019-07-26 LAB
ANION GAP SERPL CALCULATED.3IONS-SCNC: 14 MMOL/L (ref 9–17)
BUN BLDV-MCNC: 28 MG/DL (ref 6–20)
BUN/CREAT BLD: 22 (ref 9–20)
C-REACTIVE PROTEIN: 16.3 MG/L (ref 0–5)
CALCIUM SERPL-MCNC: 8.7 MG/DL (ref 8.6–10.4)
CHLORIDE BLD-SCNC: 98 MMOL/L (ref 98–107)
CO2: 27 MMOL/L (ref 20–31)
CREAT SERPL-MCNC: 1.29 MG/DL (ref 0.7–1.2)
GFR AFRICAN AMERICAN: >60 ML/MIN
GFR NON-AFRICAN AMERICAN: 60 ML/MIN
GFR SERPL CREATININE-BSD FRML MDRD: ABNORMAL ML/MIN/{1.73_M2}
GFR SERPL CREATININE-BSD FRML MDRD: ABNORMAL ML/MIN/{1.73_M2}
GLUCOSE BLD-MCNC: 134 MG/DL (ref 70–99)
HCT VFR BLD CALC: 38 % (ref 40.7–50.3)
HEMOGLOBIN: 11.6 G/DL (ref 13–17)
MCH RBC QN AUTO: 28 PG (ref 25.2–33.5)
MCHC RBC AUTO-ENTMCNC: 30.5 G/DL (ref 28.4–34.8)
MCV RBC AUTO: 91.6 FL (ref 82.6–102.9)
NRBC AUTOMATED: 0 PER 100 WBC
PDW BLD-RTO: 15.5 % (ref 11.8–14.4)
PLATELET # BLD: 253 K/UL (ref 138–453)
PMV BLD AUTO: 9.7 FL (ref 8.1–13.5)
POTASSIUM SERPL-SCNC: 3.9 MMOL/L (ref 3.7–5.3)
RBC # BLD: 4.15 M/UL (ref 4.21–5.77)
SEDIMENTATION RATE, ERYTHROCYTE: 27 MM (ref 0–15)
SODIUM BLD-SCNC: 139 MMOL/L (ref 135–144)
VANCOMYCIN TROUGH DATE LAST DOSE: NORMAL
VANCOMYCIN TROUGH DOSE AMOUNT: NORMAL
VANCOMYCIN TROUGH TIME LAST DOSE: NORMAL
VANCOMYCIN TROUGH: 13.2 UG/ML (ref 10–20)
WBC # BLD: 6.9 K/UL (ref 3.5–11.3)

## 2019-07-26 PROCEDURE — 85651 RBC SED RATE NONAUTOMATED: CPT

## 2019-07-26 PROCEDURE — 86140 C-REACTIVE PROTEIN: CPT

## 2019-07-26 PROCEDURE — P9603 ONE-WAY ALLOW PRORATED MILES: HCPCS

## 2019-07-26 PROCEDURE — 80202 ASSAY OF VANCOMYCIN: CPT

## 2019-07-26 PROCEDURE — 36415 COLL VENOUS BLD VENIPUNCTURE: CPT

## 2019-07-26 PROCEDURE — 80048 BASIC METABOLIC PNL TOTAL CA: CPT

## 2019-07-26 PROCEDURE — 85027 COMPLETE CBC AUTOMATED: CPT

## 2019-07-27 ENCOUNTER — HOSPITAL ENCOUNTER (OUTPATIENT)
Age: 49
Setting detail: SPECIMEN
Discharge: HOME | End: 2019-07-27
Payer: COMMERCIAL

## 2019-07-27 LAB
INR BLD: 1.7
PROTHROMBIN TIME: 17 SEC (ref 9.7–11.6)

## 2019-07-27 PROCEDURE — 36415 COLL VENOUS BLD VENIPUNCTURE: CPT

## 2019-07-27 PROCEDURE — 85610 PROTHROMBIN TIME: CPT

## 2019-07-27 PROCEDURE — P9603 ONE-WAY ALLOW PRORATED MILES: HCPCS

## 2019-07-29 ENCOUNTER — HOSPITAL ENCOUNTER (OUTPATIENT)
Age: 49
Setting detail: SPECIMEN
Discharge: HOME | End: 2019-07-29
Payer: COMMERCIAL

## 2019-07-29 LAB
INR BLD: 1.9
PROTHROMBIN TIME: 18.6 SEC (ref 9.7–11.6)

## 2019-07-29 PROCEDURE — 36415 COLL VENOUS BLD VENIPUNCTURE: CPT

## 2019-07-29 PROCEDURE — P9603 ONE-WAY ALLOW PRORATED MILES: HCPCS

## 2019-07-29 PROCEDURE — 85610 PROTHROMBIN TIME: CPT

## 2019-07-31 ENCOUNTER — HOSPITAL ENCOUNTER (OUTPATIENT)
Age: 49
Setting detail: SPECIMEN
Discharge: HOME | End: 2019-07-31
Payer: COMMERCIAL

## 2019-07-31 LAB
INR BLD: 1.8
PROTHROMBIN TIME: 17.9 SEC (ref 9.7–11.6)

## 2019-07-31 PROCEDURE — P9603 ONE-WAY ALLOW PRORATED MILES: HCPCS

## 2019-07-31 PROCEDURE — 36415 COLL VENOUS BLD VENIPUNCTURE: CPT

## 2019-07-31 PROCEDURE — 85610 PROTHROMBIN TIME: CPT

## 2019-08-02 ENCOUNTER — HOSPITAL ENCOUNTER (OUTPATIENT)
Age: 49
Setting detail: SPECIMEN
Discharge: HOME | End: 2019-08-02
Payer: COMMERCIAL

## 2019-08-02 LAB
ANION GAP SERPL CALCULATED.3IONS-SCNC: 19 MMOL/L (ref 9–17)
BUN BLDV-MCNC: 31 MG/DL (ref 6–20)
BUN/CREAT BLD: ABNORMAL (ref 9–20)
C-REACTIVE PROTEIN: 17.7 MG/L (ref 0–5)
CALCIUM SERPL-MCNC: 9.5 MG/DL (ref 8.6–10.4)
CHLORIDE BLD-SCNC: 103 MMOL/L (ref 98–107)
CO2: 22 MMOL/L (ref 20–31)
CREAT SERPL-MCNC: 1.43 MG/DL (ref 0.7–1.2)
GFR AFRICAN AMERICAN: >60 ML/MIN
GFR NON-AFRICAN AMERICAN: 53 ML/MIN
GFR SERPL CREATININE-BSD FRML MDRD: ABNORMAL ML/MIN/{1.73_M2}
GFR SERPL CREATININE-BSD FRML MDRD: ABNORMAL ML/MIN/{1.73_M2}
GLUCOSE BLD-MCNC: 136 MG/DL (ref 70–99)
HCT VFR BLD CALC: 40.9 % (ref 40.7–50.3)
HEMOGLOBIN: 12.2 G/DL (ref 13–17)
MCH RBC QN AUTO: 27.4 PG (ref 25.2–33.5)
MCHC RBC AUTO-ENTMCNC: 29.8 G/DL (ref 28.4–34.8)
MCV RBC AUTO: 91.9 FL (ref 82.6–102.9)
NRBC AUTOMATED: 0 PER 100 WBC
PDW BLD-RTO: 15.9 % (ref 11.8–14.4)
PLATELET # BLD: 210 K/UL (ref 138–453)
PMV BLD AUTO: 9.5 FL (ref 8.1–13.5)
POTASSIUM SERPL-SCNC: 4.1 MMOL/L (ref 3.7–5.3)
RBC # BLD: 4.45 M/UL (ref 4.21–5.77)
SEDIMENTATION RATE, ERYTHROCYTE: 24 MM (ref 0–10)
SODIUM BLD-SCNC: 144 MMOL/L (ref 135–144)
VANCOMYCIN TROUGH DATE LAST DOSE: NORMAL
VANCOMYCIN TROUGH DOSE AMOUNT: NORMAL
VANCOMYCIN TROUGH TIME LAST DOSE: NORMAL
VANCOMYCIN TROUGH: 14.3 UG/ML (ref 10–20)
WBC # BLD: 7.8 K/UL (ref 3.5–11.3)

## 2019-08-02 PROCEDURE — 80202 ASSAY OF VANCOMYCIN: CPT

## 2019-08-02 PROCEDURE — 86140 C-REACTIVE PROTEIN: CPT

## 2019-08-02 PROCEDURE — 80048 BASIC METABOLIC PNL TOTAL CA: CPT

## 2019-08-02 PROCEDURE — P9603 ONE-WAY ALLOW PRORATED MILES: HCPCS

## 2019-08-02 PROCEDURE — 85027 COMPLETE CBC AUTOMATED: CPT

## 2019-08-02 PROCEDURE — 36415 COLL VENOUS BLD VENIPUNCTURE: CPT

## 2019-08-02 PROCEDURE — 85651 RBC SED RATE NONAUTOMATED: CPT

## 2019-08-05 ENCOUNTER — HOSPITAL ENCOUNTER (OUTPATIENT)
Age: 49
Setting detail: SPECIMEN
Discharge: HOME | End: 2019-08-05
Payer: COMMERCIAL

## 2019-08-05 LAB
INR BLD: 2.4
PROTHROMBIN TIME: 23.9 SEC (ref 9.7–11.6)

## 2019-08-05 PROCEDURE — 85610 PROTHROMBIN TIME: CPT

## 2019-08-05 PROCEDURE — P9603 ONE-WAY ALLOW PRORATED MILES: HCPCS

## 2019-08-05 PROCEDURE — 36415 COLL VENOUS BLD VENIPUNCTURE: CPT

## 2019-08-06 ENCOUNTER — HOSPITAL ENCOUNTER (OUTPATIENT)
Age: 49
Setting detail: SPECIMEN
Discharge: HOME | End: 2019-08-06
Payer: COMMERCIAL

## 2019-08-06 LAB
ANION GAP SERPL CALCULATED.3IONS-SCNC: 14 MMOL/L (ref 9–17)
BUN BLDV-MCNC: 30 MG/DL (ref 6–20)
BUN/CREAT BLD: ABNORMAL (ref 9–20)
CALCIUM SERPL-MCNC: 9.3 MG/DL (ref 8.6–10.4)
CHLORIDE BLD-SCNC: 100 MMOL/L (ref 98–107)
CO2: 26 MMOL/L (ref 20–31)
CREAT SERPL-MCNC: 1.2 MG/DL (ref 0.7–1.2)
GFR AFRICAN AMERICAN: >60 ML/MIN
GFR NON-AFRICAN AMERICAN: >60 ML/MIN
GFR SERPL CREATININE-BSD FRML MDRD: ABNORMAL ML/MIN/{1.73_M2}
GFR SERPL CREATININE-BSD FRML MDRD: ABNORMAL ML/MIN/{1.73_M2}
GLUCOSE BLD-MCNC: 83 MG/DL (ref 70–99)
POTASSIUM SERPL-SCNC: 3.9 MMOL/L (ref 3.7–5.3)
SODIUM BLD-SCNC: 140 MMOL/L (ref 135–144)

## 2019-08-06 PROCEDURE — 80048 BASIC METABOLIC PNL TOTAL CA: CPT

## 2019-08-06 PROCEDURE — P9603 ONE-WAY ALLOW PRORATED MILES: HCPCS

## 2019-08-06 PROCEDURE — 36415 COLL VENOUS BLD VENIPUNCTURE: CPT

## 2019-08-08 ENCOUNTER — HOSPITAL ENCOUNTER (OUTPATIENT)
Age: 49
Setting detail: SPECIMEN
Discharge: HOME | End: 2019-08-08
Payer: COMMERCIAL

## 2019-08-08 LAB
INR BLD: 3
PROTHROMBIN TIME: 29.3 SEC (ref 9.7–11.6)

## 2019-08-08 PROCEDURE — 85610 PROTHROMBIN TIME: CPT

## 2019-08-08 PROCEDURE — 36415 COLL VENOUS BLD VENIPUNCTURE: CPT

## 2019-08-08 PROCEDURE — P9603 ONE-WAY ALLOW PRORATED MILES: HCPCS

## 2019-08-09 ENCOUNTER — HOSPITAL ENCOUNTER (OUTPATIENT)
Age: 49
Setting detail: SPECIMEN
Discharge: HOME | End: 2019-08-09
Payer: COMMERCIAL

## 2019-08-09 LAB
ANION GAP SERPL CALCULATED.3IONS-SCNC: 14 MMOL/L (ref 9–17)
BUN BLDV-MCNC: 33 MG/DL (ref 6–20)
BUN/CREAT BLD: 23 (ref 9–20)
C-REACTIVE PROTEIN: 23 MG/L (ref 0–5)
CALCIUM SERPL-MCNC: 9.2 MG/DL (ref 8.6–10.4)
CHLORIDE BLD-SCNC: 99 MMOL/L (ref 98–107)
CO2: 27 MMOL/L (ref 20–31)
CREAT SERPL-MCNC: 1.41 MG/DL (ref 0.7–1.2)
GFR AFRICAN AMERICAN: >60 ML/MIN
GFR NON-AFRICAN AMERICAN: 54 ML/MIN
GFR SERPL CREATININE-BSD FRML MDRD: ABNORMAL ML/MIN/{1.73_M2}
GFR SERPL CREATININE-BSD FRML MDRD: ABNORMAL ML/MIN/{1.73_M2}
GLUCOSE BLD-MCNC: 171 MG/DL (ref 70–99)
HCT VFR BLD CALC: 41.2 % (ref 40.7–50.3)
HEMOGLOBIN: 12.5 G/DL (ref 13–17)
MCH RBC QN AUTO: 27.5 PG (ref 25.2–33.5)
MCHC RBC AUTO-ENTMCNC: 30.3 G/DL (ref 28.4–34.8)
MCV RBC AUTO: 90.5 FL (ref 82.6–102.9)
NRBC AUTOMATED: 0 PER 100 WBC
PDW BLD-RTO: 15.5 % (ref 11.8–14.4)
PLATELET # BLD: 248 K/UL (ref 138–453)
PMV BLD AUTO: 10.1 FL (ref 8.1–13.5)
POTASSIUM SERPL-SCNC: 3.8 MMOL/L (ref 3.7–5.3)
RBC # BLD: 4.55 M/UL (ref 4.21–5.77)
SEDIMENTATION RATE, ERYTHROCYTE: 5 MM (ref 0–10)
SODIUM BLD-SCNC: 140 MMOL/L (ref 135–144)
VANCOMYCIN TROUGH DATE LAST DOSE: NORMAL
VANCOMYCIN TROUGH DOSE AMOUNT: NORMAL
VANCOMYCIN TROUGH TIME LAST DOSE: NORMAL
VANCOMYCIN TROUGH: 15.4 UG/ML (ref 10–20)
WBC # BLD: 9.4 K/UL (ref 3.5–11.3)

## 2019-08-09 PROCEDURE — 85027 COMPLETE CBC AUTOMATED: CPT

## 2019-08-09 PROCEDURE — P9603 ONE-WAY ALLOW PRORATED MILES: HCPCS

## 2019-08-09 PROCEDURE — 80048 BASIC METABOLIC PNL TOTAL CA: CPT

## 2019-08-09 PROCEDURE — 85651 RBC SED RATE NONAUTOMATED: CPT

## 2019-08-09 PROCEDURE — 80202 ASSAY OF VANCOMYCIN: CPT

## 2019-08-09 PROCEDURE — 86140 C-REACTIVE PROTEIN: CPT

## 2019-08-09 PROCEDURE — 36415 COLL VENOUS BLD VENIPUNCTURE: CPT

## 2019-08-12 ENCOUNTER — HOSPITAL ENCOUNTER (OUTPATIENT)
Age: 49
Setting detail: SPECIMEN
Discharge: HOME | End: 2019-08-12
Payer: COMMERCIAL

## 2019-08-12 LAB
INR BLD: 3.1
PROTHROMBIN TIME: 30.9 SEC (ref 9.7–11.6)

## 2019-08-12 PROCEDURE — P9603 ONE-WAY ALLOW PRORATED MILES: HCPCS

## 2019-08-12 PROCEDURE — 85610 PROTHROMBIN TIME: CPT

## 2019-08-12 PROCEDURE — 36415 COLL VENOUS BLD VENIPUNCTURE: CPT

## 2019-08-15 ENCOUNTER — HOSPITAL ENCOUNTER (OUTPATIENT)
Age: 49
Setting detail: SPECIMEN
Discharge: HOME | End: 2019-08-15
Payer: COMMERCIAL

## 2019-08-15 LAB
INR BLD: 3.9
PROTHROMBIN TIME: 38.1 SEC (ref 9.7–11.6)

## 2019-08-15 PROCEDURE — P9603 ONE-WAY ALLOW PRORATED MILES: HCPCS

## 2019-08-15 PROCEDURE — 85610 PROTHROMBIN TIME: CPT

## 2019-08-15 PROCEDURE — 36415 COLL VENOUS BLD VENIPUNCTURE: CPT

## 2019-08-16 ENCOUNTER — HOSPITAL ENCOUNTER (OUTPATIENT)
Age: 49
Setting detail: SPECIMEN
Discharge: HOME | End: 2019-08-16
Payer: COMMERCIAL

## 2019-08-16 LAB
ANION GAP SERPL CALCULATED.3IONS-SCNC: 14 MMOL/L (ref 9–17)
BUN BLDV-MCNC: 29 MG/DL (ref 6–20)
BUN/CREAT BLD: ABNORMAL (ref 9–20)
C-REACTIVE PROTEIN: 18.2 MG/L (ref 0–5)
CALCIUM SERPL-MCNC: 8.7 MG/DL (ref 8.6–10.4)
CHLORIDE BLD-SCNC: 103 MMOL/L (ref 98–107)
CO2: 27 MMOL/L (ref 20–31)
CREAT SERPL-MCNC: 1.35 MG/DL (ref 0.7–1.2)
GFR AFRICAN AMERICAN: >60 ML/MIN
GFR NON-AFRICAN AMERICAN: 57 ML/MIN
GFR SERPL CREATININE-BSD FRML MDRD: ABNORMAL ML/MIN/{1.73_M2}
GFR SERPL CREATININE-BSD FRML MDRD: ABNORMAL ML/MIN/{1.73_M2}
GLUCOSE BLD-MCNC: 155 MG/DL (ref 70–99)
HCT VFR BLD CALC: 40.7 % (ref 40.7–50.3)
HEMOGLOBIN: 12.2 G/DL (ref 13–17)
MCH RBC QN AUTO: 27.2 PG (ref 25.2–33.5)
MCHC RBC AUTO-ENTMCNC: 30 G/DL (ref 28.4–34.8)
MCV RBC AUTO: 90.8 FL (ref 82.6–102.9)
NRBC AUTOMATED: 0 PER 100 WBC
PDW BLD-RTO: 15.3 % (ref 11.8–14.4)
PLATELET # BLD: 281 K/UL (ref 138–453)
PMV BLD AUTO: 9.5 FL (ref 8.1–13.5)
POTASSIUM SERPL-SCNC: 4 MMOL/L (ref 3.7–5.3)
RBC # BLD: 4.48 M/UL (ref 4.21–5.77)
SEDIMENTATION RATE, ERYTHROCYTE: 13 MM (ref 0–10)
SODIUM BLD-SCNC: 144 MMOL/L (ref 135–144)
VANCOMYCIN TROUGH DATE LAST DOSE: NORMAL
VANCOMYCIN TROUGH DOSE AMOUNT: NORMAL
VANCOMYCIN TROUGH TIME LAST DOSE: NORMAL
VANCOMYCIN TROUGH: 16.6 UG/ML (ref 10–20)
WBC # BLD: 7.2 K/UL (ref 3.5–11.3)

## 2019-08-16 PROCEDURE — 85027 COMPLETE CBC AUTOMATED: CPT

## 2019-08-16 PROCEDURE — 86140 C-REACTIVE PROTEIN: CPT

## 2019-08-16 PROCEDURE — P9603 ONE-WAY ALLOW PRORATED MILES: HCPCS

## 2019-08-16 PROCEDURE — 80048 BASIC METABOLIC PNL TOTAL CA: CPT

## 2019-08-16 PROCEDURE — 85651 RBC SED RATE NONAUTOMATED: CPT

## 2019-08-16 PROCEDURE — 80202 ASSAY OF VANCOMYCIN: CPT

## 2019-08-16 PROCEDURE — 36415 COLL VENOUS BLD VENIPUNCTURE: CPT

## 2019-08-19 ENCOUNTER — HOSPITAL ENCOUNTER (OUTPATIENT)
Age: 49
Setting detail: SPECIMEN
Discharge: HOME | End: 2019-08-19
Payer: COMMERCIAL

## 2019-08-19 LAB
INR BLD: 3
PROTHROMBIN TIME: 29.2 SEC (ref 9.7–11.6)

## 2019-08-19 PROCEDURE — P9603 ONE-WAY ALLOW PRORATED MILES: HCPCS

## 2019-08-19 PROCEDURE — 85610 PROTHROMBIN TIME: CPT

## 2019-08-19 PROCEDURE — 36415 COLL VENOUS BLD VENIPUNCTURE: CPT

## 2019-08-22 ENCOUNTER — HOSPITAL ENCOUNTER (OUTPATIENT)
Age: 49
Setting detail: SPECIMEN
Discharge: HOME | End: 2019-08-22
Payer: COMMERCIAL

## 2019-08-22 LAB
INR BLD: 2.3
PROTHROMBIN TIME: 23.3 SEC (ref 9.7–11.6)

## 2019-08-22 PROCEDURE — 85610 PROTHROMBIN TIME: CPT

## 2019-08-22 PROCEDURE — P9603 ONE-WAY ALLOW PRORATED MILES: HCPCS

## 2019-08-22 PROCEDURE — 36415 COLL VENOUS BLD VENIPUNCTURE: CPT

## 2019-08-23 ENCOUNTER — HOSPITAL ENCOUNTER (OUTPATIENT)
Age: 49
Setting detail: SPECIMEN
Discharge: HOME | End: 2019-08-23
Payer: COMMERCIAL

## 2019-08-23 LAB
ANION GAP SERPL CALCULATED.3IONS-SCNC: 15 MMOL/L (ref 9–17)
BUN BLDV-MCNC: 26 MG/DL (ref 6–20)
BUN/CREAT BLD: ABNORMAL (ref 9–20)
C-REACTIVE PROTEIN: 20.9 MG/L (ref 0–5)
CALCIUM SERPL-MCNC: 8.8 MG/DL (ref 8.6–10.4)
CHLORIDE BLD-SCNC: 100 MMOL/L (ref 98–107)
CO2: 27 MMOL/L (ref 20–31)
CREAT SERPL-MCNC: 1.47 MG/DL (ref 0.7–1.2)
GFR AFRICAN AMERICAN: >60 ML/MIN
GFR NON-AFRICAN AMERICAN: 52 ML/MIN
GFR SERPL CREATININE-BSD FRML MDRD: ABNORMAL ML/MIN/{1.73_M2}
GFR SERPL CREATININE-BSD FRML MDRD: ABNORMAL ML/MIN/{1.73_M2}
GLUCOSE BLD-MCNC: 81 MG/DL (ref 70–99)
HCT VFR BLD CALC: 37.5 % (ref 40.7–50.3)
HEMOGLOBIN: 11.5 G/DL (ref 13–17)
MCH RBC QN AUTO: 28 PG (ref 25.2–33.5)
MCHC RBC AUTO-ENTMCNC: 30.7 G/DL (ref 28.4–34.8)
MCV RBC AUTO: 91.2 FL (ref 82.6–102.9)
NRBC AUTOMATED: 0 PER 100 WBC
PDW BLD-RTO: 15.1 % (ref 11.8–14.4)
PLATELET # BLD: 252 K/UL (ref 138–453)
PMV BLD AUTO: 9.9 FL (ref 8.1–13.5)
POTASSIUM SERPL-SCNC: 3.8 MMOL/L (ref 3.7–5.3)
RBC # BLD: 4.11 M/UL (ref 4.21–5.77)
SEDIMENTATION RATE, ERYTHROCYTE: 10 MM (ref 0–10)
SODIUM BLD-SCNC: 142 MMOL/L (ref 135–144)
WBC # BLD: 8.5 K/UL (ref 3.5–11.3)

## 2019-08-23 PROCEDURE — P9603 ONE-WAY ALLOW PRORATED MILES: HCPCS

## 2019-08-23 PROCEDURE — 85651 RBC SED RATE NONAUTOMATED: CPT

## 2019-08-23 PROCEDURE — 85027 COMPLETE CBC AUTOMATED: CPT

## 2019-08-23 PROCEDURE — 36415 COLL VENOUS BLD VENIPUNCTURE: CPT

## 2019-08-23 PROCEDURE — 86140 C-REACTIVE PROTEIN: CPT

## 2019-08-23 PROCEDURE — 80048 BASIC METABOLIC PNL TOTAL CA: CPT

## 2019-08-26 ENCOUNTER — HOSPITAL ENCOUNTER (OUTPATIENT)
Age: 49
Setting detail: SPECIMEN
Discharge: HOME | End: 2019-08-26
Payer: COMMERCIAL

## 2019-08-26 LAB
ANION GAP SERPL CALCULATED.3IONS-SCNC: 17 MMOL/L (ref 9–17)
BUN BLDV-MCNC: 28 MG/DL (ref 6–20)
BUN/CREAT BLD: ABNORMAL (ref 9–20)
C-REACTIVE PROTEIN: 32 MG/L (ref 0–5)
CALCIUM SERPL-MCNC: 8.8 MG/DL (ref 8.6–10.4)
CHLORIDE BLD-SCNC: 103 MMOL/L (ref 98–107)
CO2: 27 MMOL/L (ref 20–31)
CREAT SERPL-MCNC: 1.39 MG/DL (ref 0.7–1.2)
GFR AFRICAN AMERICAN: >60 ML/MIN
GFR NON-AFRICAN AMERICAN: 55 ML/MIN
GFR SERPL CREATININE-BSD FRML MDRD: ABNORMAL ML/MIN/{1.73_M2}
GFR SERPL CREATININE-BSD FRML MDRD: ABNORMAL ML/MIN/{1.73_M2}
GLUCOSE BLD-MCNC: 89 MG/DL (ref 70–99)
HCT VFR BLD CALC: 38 % (ref 40.7–50.3)
HEMOGLOBIN: 11.7 G/DL (ref 13–17)
INR BLD: 2.1
MCH RBC QN AUTO: 28.2 PG (ref 25.2–33.5)
MCHC RBC AUTO-ENTMCNC: 30.8 G/DL (ref 28.4–34.8)
MCV RBC AUTO: 91.6 FL (ref 82.6–102.9)
NRBC AUTOMATED: 0 PER 100 WBC
PDW BLD-RTO: 15 % (ref 11.8–14.4)
PLATELET # BLD: 221 K/UL (ref 138–453)
PMV BLD AUTO: 9.8 FL (ref 8.1–13.5)
POTASSIUM SERPL-SCNC: 3.8 MMOL/L (ref 3.7–5.3)
PROTHROMBIN TIME: 20.7 SEC (ref 9.7–11.6)
RBC # BLD: 4.15 M/UL (ref 4.21–5.77)
SEDIMENTATION RATE, ERYTHROCYTE: 13 MM (ref 0–10)
SODIUM BLD-SCNC: 147 MMOL/L (ref 135–144)
WBC # BLD: 8.8 K/UL (ref 3.5–11.3)

## 2019-08-26 PROCEDURE — 85610 PROTHROMBIN TIME: CPT

## 2019-08-26 PROCEDURE — 80048 BASIC METABOLIC PNL TOTAL CA: CPT

## 2019-08-26 PROCEDURE — 85027 COMPLETE CBC AUTOMATED: CPT

## 2019-08-26 PROCEDURE — 36415 COLL VENOUS BLD VENIPUNCTURE: CPT

## 2019-08-26 PROCEDURE — P9603 ONE-WAY ALLOW PRORATED MILES: HCPCS

## 2019-08-26 PROCEDURE — 86140 C-REACTIVE PROTEIN: CPT

## 2019-08-26 PROCEDURE — 85651 RBC SED RATE NONAUTOMATED: CPT

## 2019-08-29 ENCOUNTER — HOSPITAL ENCOUNTER (OUTPATIENT)
Age: 49
Setting detail: SPECIMEN
Discharge: HOME | End: 2019-08-29
Payer: COMMERCIAL

## 2019-08-29 LAB
INR BLD: 2.2
PROTHROMBIN TIME: 21.6 SEC (ref 9.7–11.6)

## 2019-08-29 PROCEDURE — 85610 PROTHROMBIN TIME: CPT

## 2019-08-29 PROCEDURE — P9603 ONE-WAY ALLOW PRORATED MILES: HCPCS

## 2019-08-29 PROCEDURE — 36415 COLL VENOUS BLD VENIPUNCTURE: CPT

## 2019-09-03 ENCOUNTER — HOSPITAL ENCOUNTER (OUTPATIENT)
Age: 49
Setting detail: SPECIMEN
Discharge: HOME | End: 2019-09-03
Payer: COMMERCIAL

## 2019-09-03 LAB
ANION GAP SERPL CALCULATED.3IONS-SCNC: 15 MMOL/L (ref 9–17)
BUN BLDV-MCNC: 24 MG/DL (ref 6–20)
BUN/CREAT BLD: ABNORMAL (ref 9–20)
C-REACTIVE PROTEIN: 18.3 MG/L (ref 0–5)
CALCIUM SERPL-MCNC: 8.7 MG/DL (ref 8.6–10.4)
CHLORIDE BLD-SCNC: 102 MMOL/L (ref 98–107)
CO2: 24 MMOL/L (ref 20–31)
CREAT SERPL-MCNC: 1.56 MG/DL (ref 0.7–1.2)
GFR AFRICAN AMERICAN: 58 ML/MIN
GFR NON-AFRICAN AMERICAN: 48 ML/MIN
GFR SERPL CREATININE-BSD FRML MDRD: ABNORMAL ML/MIN/{1.73_M2}
GFR SERPL CREATININE-BSD FRML MDRD: ABNORMAL ML/MIN/{1.73_M2}
GLUCOSE BLD-MCNC: 77 MG/DL (ref 70–99)
HCT VFR BLD CALC: 38.7 % (ref 40.7–50.3)
HEMOGLOBIN: 11.7 G/DL (ref 13–17)
INR BLD: 3.9
MCH RBC QN AUTO: 27.9 PG (ref 25.2–33.5)
MCHC RBC AUTO-ENTMCNC: 30.2 G/DL (ref 28.4–34.8)
MCV RBC AUTO: 92.1 FL (ref 82.6–102.9)
NRBC AUTOMATED: 0 PER 100 WBC
PDW BLD-RTO: 15 % (ref 11.8–14.4)
PLATELET # BLD: 230 K/UL (ref 138–453)
PMV BLD AUTO: 10 FL (ref 8.1–13.5)
POTASSIUM SERPL-SCNC: 4.3 MMOL/L (ref 3.7–5.3)
PROTHROMBIN TIME: 38.4 SEC (ref 9.7–11.6)
RBC # BLD: 4.2 M/UL (ref 4.21–5.77)
SEDIMENTATION RATE, ERYTHROCYTE: 13 MM (ref 0–10)
SODIUM BLD-SCNC: 141 MMOL/L (ref 135–144)
WBC # BLD: 9.2 K/UL (ref 3.5–11.3)

## 2019-09-03 PROCEDURE — 85027 COMPLETE CBC AUTOMATED: CPT

## 2019-09-03 PROCEDURE — 85651 RBC SED RATE NONAUTOMATED: CPT

## 2019-09-03 PROCEDURE — P9603 ONE-WAY ALLOW PRORATED MILES: HCPCS

## 2019-09-03 PROCEDURE — 85610 PROTHROMBIN TIME: CPT

## 2019-09-03 PROCEDURE — 86140 C-REACTIVE PROTEIN: CPT

## 2019-09-03 PROCEDURE — 80048 BASIC METABOLIC PNL TOTAL CA: CPT

## 2019-09-03 PROCEDURE — 36415 COLL VENOUS BLD VENIPUNCTURE: CPT

## 2019-12-27 ENCOUNTER — HOSPITAL ENCOUNTER (OUTPATIENT)
Age: 49
Setting detail: SPECIMEN
Discharge: HOME | End: 2019-12-27
Payer: COMMERCIAL

## 2019-12-27 LAB
ABSOLUTE EOS #: 0.51 K/UL (ref 0–0.44)
ABSOLUTE IMMATURE GRANULOCYTE: 0.07 K/UL (ref 0–0.3)
ABSOLUTE LYMPH #: 1.35 K/UL (ref 1.1–3.7)
ABSOLUTE MONO #: 0.72 K/UL (ref 0.1–1.2)
ANION GAP SERPL CALCULATED.3IONS-SCNC: 11 MMOL/L (ref 9–17)
BASOPHILS # BLD: 1 % (ref 0–2)
BASOPHILS ABSOLUTE: 0.05 K/UL (ref 0–0.2)
BUN BLDV-MCNC: 19 MG/DL (ref 6–20)
BUN/CREAT BLD: 14 (ref 9–20)
C-REACTIVE PROTEIN: 23.8 MG/L (ref 0–5)
CALCIUM SERPL-MCNC: 8.8 MG/DL (ref 8.6–10.4)
CHLORIDE BLD-SCNC: 99 MMOL/L (ref 98–107)
CO2: 28 MMOL/L (ref 20–31)
CREAT SERPL-MCNC: 1.33 MG/DL (ref 0.7–1.2)
DIFFERENTIAL TYPE: ABNORMAL
EOSINOPHILS RELATIVE PERCENT: 7 % (ref 1–4)
GFR AFRICAN AMERICAN: >60 ML/MIN
GFR NON-AFRICAN AMERICAN: 58 ML/MIN
GFR SERPL CREATININE-BSD FRML MDRD: ABNORMAL ML/MIN/{1.73_M2}
GFR SERPL CREATININE-BSD FRML MDRD: ABNORMAL ML/MIN/{1.73_M2}
GLUCOSE BLD-MCNC: 93 MG/DL (ref 70–99)
HCT VFR BLD CALC: 36.9 % (ref 40.7–50.3)
HEMOGLOBIN: 10.9 G/DL (ref 13–17)
IMMATURE GRANULOCYTES: 1 %
INR BLD: 2
LYMPHOCYTES # BLD: 17 % (ref 24–43)
MCH RBC QN AUTO: 25.4 PG (ref 25.2–33.5)
MCHC RBC AUTO-ENTMCNC: 29.5 G/DL (ref 28.4–34.8)
MCV RBC AUTO: 86 FL (ref 82.6–102.9)
MONOCYTES # BLD: 9 % (ref 3–12)
NRBC AUTOMATED: 0 PER 100 WBC
PDW BLD-RTO: 15 % (ref 11.8–14.4)
PLATELET # BLD: 137 K/UL (ref 138–453)
PLATELET ESTIMATE: ABNORMAL
PMV BLD AUTO: 11.3 FL (ref 8.1–13.5)
POTASSIUM SERPL-SCNC: 4 MMOL/L (ref 3.7–5.3)
PROTHROMBIN TIME: 20.3 SEC (ref 9.7–11.6)
RBC # BLD: 4.29 M/UL (ref 4.21–5.77)
RBC # BLD: ABNORMAL 10*6/UL
SEDIMENTATION RATE, ERYTHROCYTE: 33 MM (ref 0–10)
SEG NEUTROPHILS: 65 % (ref 36–65)
SEGMENTED NEUTROPHILS ABSOLUTE COUNT: 5.16 K/UL (ref 1.5–8.1)
SODIUM BLD-SCNC: 138 MMOL/L (ref 135–144)
WBC # BLD: 7.9 K/UL (ref 3.5–11.3)
WBC # BLD: ABNORMAL 10*3/UL

## 2019-12-27 PROCEDURE — 36415 COLL VENOUS BLD VENIPUNCTURE: CPT

## 2019-12-27 PROCEDURE — P9603 ONE-WAY ALLOW PRORATED MILES: HCPCS

## 2019-12-27 PROCEDURE — 85025 COMPLETE CBC W/AUTO DIFF WBC: CPT

## 2019-12-27 PROCEDURE — 80048 BASIC METABOLIC PNL TOTAL CA: CPT

## 2019-12-27 PROCEDURE — 85610 PROTHROMBIN TIME: CPT

## 2019-12-27 PROCEDURE — 85651 RBC SED RATE NONAUTOMATED: CPT

## 2019-12-27 PROCEDURE — 86140 C-REACTIVE PROTEIN: CPT

## 2019-12-30 ENCOUNTER — HOSPITAL ENCOUNTER (OUTPATIENT)
Age: 49
Setting detail: SPECIMEN
Discharge: HOME | End: 2019-12-30
Payer: COMMERCIAL

## 2019-12-30 LAB
ANION GAP SERPL CALCULATED.3IONS-SCNC: 11 MMOL/L (ref 9–17)
BUN BLDV-MCNC: 21 MG/DL (ref 6–20)
BUN/CREAT BLD: 16 (ref 9–20)
CALCIUM SERPL-MCNC: 8.8 MG/DL (ref 8.6–10.4)
CHLORIDE BLD-SCNC: 101 MMOL/L (ref 98–107)
CO2: 28 MMOL/L (ref 20–31)
CREAT SERPL-MCNC: 1.29 MG/DL (ref 0.7–1.2)
GFR AFRICAN AMERICAN: >60 ML/MIN
GFR NON-AFRICAN AMERICAN: 60 ML/MIN
GFR SERPL CREATININE-BSD FRML MDRD: ABNORMAL ML/MIN/{1.73_M2}
GFR SERPL CREATININE-BSD FRML MDRD: ABNORMAL ML/MIN/{1.73_M2}
GLUCOSE BLD-MCNC: 88 MG/DL (ref 70–99)
HCT VFR BLD CALC: 37.4 % (ref 40.7–50.3)
HEMOGLOBIN: 11 G/DL (ref 13–17)
INR BLD: 1.6
MCH RBC QN AUTO: 25.6 PG (ref 25.2–33.5)
MCHC RBC AUTO-ENTMCNC: 29.4 G/DL (ref 28.4–34.8)
MCV RBC AUTO: 87.2 FL (ref 82.6–102.9)
NRBC AUTOMATED: 0 PER 100 WBC
PDW BLD-RTO: 15.7 % (ref 11.8–14.4)
PLATELET # BLD: 115 K/UL (ref 138–453)
PMV BLD AUTO: 10.5 FL (ref 8.1–13.5)
POTASSIUM SERPL-SCNC: 4.1 MMOL/L (ref 3.7–5.3)
PROTHROMBIN TIME: 16.6 SEC (ref 9.7–11.6)
RBC # BLD: 4.29 M/UL (ref 4.21–5.77)
SODIUM BLD-SCNC: 140 MMOL/L (ref 135–144)
WBC # BLD: 7.6 K/UL (ref 3.5–11.3)

## 2019-12-30 PROCEDURE — 85027 COMPLETE CBC AUTOMATED: CPT

## 2019-12-30 PROCEDURE — 80048 BASIC METABOLIC PNL TOTAL CA: CPT

## 2019-12-30 PROCEDURE — 36415 COLL VENOUS BLD VENIPUNCTURE: CPT

## 2019-12-30 PROCEDURE — P9603 ONE-WAY ALLOW PRORATED MILES: HCPCS

## 2019-12-30 PROCEDURE — 85610 PROTHROMBIN TIME: CPT

## 2020-01-02 ENCOUNTER — HOSPITAL ENCOUNTER (OUTPATIENT)
Age: 50
Setting detail: SPECIMEN
Discharge: HOME | End: 2020-01-02
Payer: COMMERCIAL

## 2020-01-02 LAB
ABSOLUTE EOS #: 0.65 K/UL (ref 0–0.44)
ABSOLUTE EOS #: 0.71 K/UL (ref 0–0.44)
ABSOLUTE IMMATURE GRANULOCYTE: 0.04 K/UL (ref 0–0.3)
ABSOLUTE IMMATURE GRANULOCYTE: 0.07 K/UL (ref 0–0.3)
ABSOLUTE LYMPH #: 1.32 K/UL (ref 1.1–3.7)
ABSOLUTE LYMPH #: 1.48 K/UL (ref 1.1–3.7)
ABSOLUTE MONO #: 0.56 K/UL (ref 0.1–1.2)
ABSOLUTE MONO #: 0.61 K/UL (ref 0.1–1.2)
ANION GAP SERPL CALCULATED.3IONS-SCNC: 13 MMOL/L (ref 9–17)
ANION GAP SERPL CALCULATED.3IONS-SCNC: 14 MMOL/L (ref 9–17)
BASOPHILS # BLD: 1 % (ref 0–2)
BASOPHILS # BLD: 1 % (ref 0–2)
BASOPHILS ABSOLUTE: 0.05 K/UL (ref 0–0.2)
BASOPHILS ABSOLUTE: 0.06 K/UL (ref 0–0.2)
BUN BLDV-MCNC: 20 MG/DL (ref 6–20)
BUN BLDV-MCNC: 21 MG/DL (ref 6–20)
BUN/CREAT BLD: 15 (ref 9–20)
BUN/CREAT BLD: 15 (ref 9–20)
C-REACTIVE PROTEIN: 19.5 MG/L (ref 0–5)
C-REACTIVE PROTEIN: 21.3 MG/L (ref 0–5)
CALCIUM SERPL-MCNC: 9 MG/DL (ref 8.6–10.4)
CALCIUM SERPL-MCNC: 9.2 MG/DL (ref 8.6–10.4)
CHLORIDE BLD-SCNC: 97 MMOL/L (ref 98–107)
CHLORIDE BLD-SCNC: 99 MMOL/L (ref 98–107)
CO2: 26 MMOL/L (ref 20–31)
CO2: 29 MMOL/L (ref 20–31)
CREAT SERPL-MCNC: 1.35 MG/DL (ref 0.7–1.2)
CREAT SERPL-MCNC: 1.38 MG/DL (ref 0.7–1.2)
DIFFERENTIAL TYPE: ABNORMAL
DIFFERENTIAL TYPE: ABNORMAL
EOSINOPHILS RELATIVE PERCENT: 7 % (ref 1–4)
EOSINOPHILS RELATIVE PERCENT: 8 % (ref 1–4)
GFR AFRICAN AMERICAN: >60 ML/MIN
GFR AFRICAN AMERICAN: >60 ML/MIN
GFR NON-AFRICAN AMERICAN: 55 ML/MIN
GFR NON-AFRICAN AMERICAN: 57 ML/MIN
GFR SERPL CREATININE-BSD FRML MDRD: ABNORMAL ML/MIN/{1.73_M2}
GLUCOSE BLD-MCNC: 161 MG/DL (ref 70–99)
GLUCOSE BLD-MCNC: 88 MG/DL (ref 70–99)
HCT VFR BLD CALC: 39.5 % (ref 40.7–50.3)
HCT VFR BLD CALC: 41.1 % (ref 40.7–50.3)
HEMOGLOBIN: 11.8 G/DL (ref 13–17)
HEMOGLOBIN: 12 G/DL (ref 13–17)
IMMATURE GRANULOCYTES: 1 %
IMMATURE GRANULOCYTES: 1 %
INR BLD: 1.6
LYMPHOCYTES # BLD: 13 % (ref 24–43)
LYMPHOCYTES # BLD: 19 % (ref 24–43)
MCH RBC QN AUTO: 25.5 PG (ref 25.2–33.5)
MCH RBC QN AUTO: 25.7 PG (ref 25.2–33.5)
MCHC RBC AUTO-ENTMCNC: 29.2 G/DL (ref 28.4–34.8)
MCHC RBC AUTO-ENTMCNC: 29.9 G/DL (ref 28.4–34.8)
MCV RBC AUTO: 86.1 FL (ref 82.6–102.9)
MCV RBC AUTO: 87.4 FL (ref 82.6–102.9)
MONOCYTES # BLD: 6 % (ref 3–12)
MONOCYTES # BLD: 8 % (ref 3–12)
NRBC AUTOMATED: 0 PER 100 WBC
NRBC AUTOMATED: 0 PER 100 WBC
PDW BLD-RTO: 16.2 % (ref 11.8–14.4)
PDW BLD-RTO: 16.2 % (ref 11.8–14.4)
PLATELET # BLD: 137 K/UL (ref 138–453)
PLATELET # BLD: 202 K/UL (ref 138–453)
PLATELET ESTIMATE: ABNORMAL
PLATELET ESTIMATE: ABNORMAL
PMV BLD AUTO: 10.8 FL (ref 8.1–13.5)
PMV BLD AUTO: 12 FL (ref 8.1–13.5)
POTASSIUM SERPL-SCNC: 4.3 MMOL/L (ref 3.7–5.3)
POTASSIUM SERPL-SCNC: 4.6 MMOL/L (ref 3.7–5.3)
PROTHROMBIN TIME: 16.6 SEC (ref 9.7–11.6)
RBC # BLD: 4.59 M/UL (ref 4.21–5.77)
RBC # BLD: 4.7 M/UL (ref 4.21–5.77)
RBC # BLD: ABNORMAL 10*6/UL
RBC # BLD: ABNORMAL 10*6/UL
SEDIMENTATION RATE, ERYTHROCYTE: 33 MM (ref 0–15)
SEDIMENTATION RATE, ERYTHROCYTE: 43 MM (ref 0–15)
SEG NEUTROPHILS: 63 % (ref 36–65)
SEG NEUTROPHILS: 72 % (ref 36–65)
SEGMENTED NEUTROPHILS ABSOLUTE COUNT: 5.17 K/UL (ref 1.5–8.1)
SEGMENTED NEUTROPHILS ABSOLUTE COUNT: 7.18 K/UL (ref 1.5–8.1)
SODIUM BLD-SCNC: 139 MMOL/L (ref 135–144)
SODIUM BLD-SCNC: 139 MMOL/L (ref 135–144)
WBC # BLD: 8 K/UL (ref 3.5–11.3)
WBC # BLD: 9.9 K/UL (ref 3.5–11.3)
WBC # BLD: ABNORMAL 10*3/UL
WBC # BLD: ABNORMAL 10*3/UL

## 2020-01-02 PROCEDURE — 80048 BASIC METABOLIC PNL TOTAL CA: CPT

## 2020-01-02 PROCEDURE — P9603 ONE-WAY ALLOW PRORATED MILES: HCPCS

## 2020-01-02 PROCEDURE — 85610 PROTHROMBIN TIME: CPT

## 2020-01-02 PROCEDURE — 86140 C-REACTIVE PROTEIN: CPT

## 2020-01-02 PROCEDURE — 85651 RBC SED RATE NONAUTOMATED: CPT

## 2020-01-02 PROCEDURE — 36415 COLL VENOUS BLD VENIPUNCTURE: CPT

## 2020-01-02 PROCEDURE — 85025 COMPLETE CBC W/AUTO DIFF WBC: CPT

## 2020-01-06 ENCOUNTER — HOSPITAL ENCOUNTER (OUTPATIENT)
Age: 50
Setting detail: SPECIMEN
Discharge: HOME | End: 2020-01-06
Payer: COMMERCIAL

## 2020-01-06 LAB
INR BLD: 2.7
PROTHROMBIN TIME: 26.7 SEC (ref 9.7–11.6)

## 2020-01-06 PROCEDURE — 85610 PROTHROMBIN TIME: CPT

## 2020-01-06 PROCEDURE — 36415 COLL VENOUS BLD VENIPUNCTURE: CPT

## 2020-01-06 PROCEDURE — P9603 ONE-WAY ALLOW PRORATED MILES: HCPCS

## 2020-01-09 ENCOUNTER — HOSPITAL ENCOUNTER (OUTPATIENT)
Age: 50
Setting detail: SPECIMEN
Discharge: HOME | End: 2020-01-09
Payer: COMMERCIAL

## 2020-01-09 LAB
ABSOLUTE EOS #: 0.8 K/UL (ref 0–0.44)
ABSOLUTE IMMATURE GRANULOCYTE: 0.06 K/UL (ref 0–0.3)
ABSOLUTE LYMPH #: 1.23 K/UL (ref 1.1–3.7)
ABSOLUTE MONO #: 0.63 K/UL (ref 0.1–1.2)
ANION GAP SERPL CALCULATED.3IONS-SCNC: 12 MMOL/L (ref 9–17)
BASOPHILS # BLD: 1 % (ref 0–2)
BASOPHILS ABSOLUTE: 0.04 K/UL (ref 0–0.2)
BUN BLDV-MCNC: 21 MG/DL (ref 6–20)
BUN/CREAT BLD: 16 (ref 9–20)
C-REACTIVE PROTEIN: 20.1 MG/L (ref 0–5)
CALCIUM SERPL-MCNC: 9.2 MG/DL (ref 8.6–10.4)
CHLORIDE BLD-SCNC: 98 MMOL/L (ref 98–107)
CO2: 29 MMOL/L (ref 20–31)
CREAT SERPL-MCNC: 1.35 MG/DL (ref 0.7–1.2)
DIFFERENTIAL TYPE: ABNORMAL
EOSINOPHILS RELATIVE PERCENT: 11 % (ref 1–4)
GFR AFRICAN AMERICAN: >60 ML/MIN
GFR NON-AFRICAN AMERICAN: 57 ML/MIN
GFR SERPL CREATININE-BSD FRML MDRD: ABNORMAL ML/MIN/{1.73_M2}
GFR SERPL CREATININE-BSD FRML MDRD: ABNORMAL ML/MIN/{1.73_M2}
GLUCOSE BLD-MCNC: 101 MG/DL (ref 70–99)
HCT VFR BLD CALC: 41.8 % (ref 40.7–50.3)
HEMOGLOBIN: 12.4 G/DL (ref 13–17)
IMMATURE GRANULOCYTES: 1 %
INR BLD: 3.2
LYMPHOCYTES # BLD: 17 % (ref 24–43)
MCH RBC QN AUTO: 25.6 PG (ref 25.2–33.5)
MCHC RBC AUTO-ENTMCNC: 29.7 G/DL (ref 28.4–34.8)
MCV RBC AUTO: 86.4 FL (ref 82.6–102.9)
MONOCYTES # BLD: 9 % (ref 3–12)
NRBC AUTOMATED: 0 PER 100 WBC
PDW BLD-RTO: 16.8 % (ref 11.8–14.4)
PLATELET # BLD: 205 K/UL (ref 138–453)
PLATELET ESTIMATE: ABNORMAL
PMV BLD AUTO: 10.8 FL (ref 8.1–13.5)
POTASSIUM SERPL-SCNC: 4.1 MMOL/L (ref 3.7–5.3)
PROTHROMBIN TIME: 31.4 SEC (ref 9.7–11.6)
RBC # BLD: 4.84 M/UL (ref 4.21–5.77)
RBC # BLD: ABNORMAL 10*6/UL
SEDIMENTATION RATE, ERYTHROCYTE: 37 MM (ref 0–15)
SEG NEUTROPHILS: 61 % (ref 36–65)
SEGMENTED NEUTROPHILS ABSOLUTE COUNT: 4.3 K/UL (ref 1.5–8.1)
SODIUM BLD-SCNC: 139 MMOL/L (ref 135–144)
WBC # BLD: 7.1 K/UL (ref 3.5–11.3)
WBC # BLD: ABNORMAL 10*3/UL

## 2020-01-09 PROCEDURE — 86140 C-REACTIVE PROTEIN: CPT

## 2020-01-09 PROCEDURE — 85651 RBC SED RATE NONAUTOMATED: CPT

## 2020-01-09 PROCEDURE — P9603 ONE-WAY ALLOW PRORATED MILES: HCPCS

## 2020-01-09 PROCEDURE — 85025 COMPLETE CBC W/AUTO DIFF WBC: CPT

## 2020-01-09 PROCEDURE — 85610 PROTHROMBIN TIME: CPT

## 2020-01-09 PROCEDURE — 80048 BASIC METABOLIC PNL TOTAL CA: CPT

## 2020-01-09 PROCEDURE — 36415 COLL VENOUS BLD VENIPUNCTURE: CPT

## 2020-01-13 ENCOUNTER — HOSPITAL ENCOUNTER (OUTPATIENT)
Age: 50
Setting detail: SPECIMEN
Discharge: HOME | End: 2020-01-13
Payer: COMMERCIAL

## 2020-01-13 LAB
INR BLD: 2.3
PROTHROMBIN TIME: 22.7 SEC (ref 9.7–11.6)

## 2020-01-13 PROCEDURE — P9603 ONE-WAY ALLOW PRORATED MILES: HCPCS

## 2020-01-13 PROCEDURE — 85610 PROTHROMBIN TIME: CPT

## 2020-01-13 PROCEDURE — 36415 COLL VENOUS BLD VENIPUNCTURE: CPT

## 2020-01-16 ENCOUNTER — HOSPITAL ENCOUNTER (OUTPATIENT)
Age: 50
Setting detail: SPECIMEN
Discharge: HOME | End: 2020-01-16
Payer: COMMERCIAL

## 2020-01-16 LAB
ABSOLUTE EOS #: 0.65 K/UL (ref 0–0.44)
ABSOLUTE IMMATURE GRANULOCYTE: 0.04 K/UL (ref 0–0.3)
ABSOLUTE LYMPH #: 1.01 K/UL (ref 1.1–3.7)
ABSOLUTE MONO #: 0.68 K/UL (ref 0.1–1.2)
ANION GAP SERPL CALCULATED.3IONS-SCNC: 11 MMOL/L (ref 9–17)
BASOPHILS # BLD: 1 % (ref 0–2)
BASOPHILS ABSOLUTE: 0.05 K/UL (ref 0–0.2)
BUN BLDV-MCNC: 26 MG/DL (ref 6–20)
BUN/CREAT BLD: 19 (ref 9–20)
C-REACTIVE PROTEIN: 16.1 MG/L (ref 0–5)
CALCIUM SERPL-MCNC: 9.1 MG/DL (ref 8.6–10.4)
CHLORIDE BLD-SCNC: 98 MMOL/L (ref 98–107)
CO2: 29 MMOL/L (ref 20–31)
CREAT SERPL-MCNC: 1.36 MG/DL (ref 0.7–1.2)
DIFFERENTIAL TYPE: ABNORMAL
EOSINOPHILS RELATIVE PERCENT: 9 % (ref 1–4)
GFR AFRICAN AMERICAN: >60 ML/MIN
GFR NON-AFRICAN AMERICAN: 56 ML/MIN
GFR SERPL CREATININE-BSD FRML MDRD: ABNORMAL ML/MIN/{1.73_M2}
GFR SERPL CREATININE-BSD FRML MDRD: ABNORMAL ML/MIN/{1.73_M2}
GLUCOSE BLD-MCNC: 93 MG/DL (ref 70–99)
HCT VFR BLD CALC: 38.4 % (ref 40.7–50.3)
HEMOGLOBIN: 11.3 G/DL (ref 13–17)
IMMATURE GRANULOCYTES: 1 %
INR BLD: 2.2
LYMPHOCYTES # BLD: 14 % (ref 24–43)
MCH RBC QN AUTO: 26 PG (ref 25.2–33.5)
MCHC RBC AUTO-ENTMCNC: 29.4 G/DL (ref 28.4–34.8)
MCV RBC AUTO: 88.3 FL (ref 82.6–102.9)
MONOCYTES # BLD: 9 % (ref 3–12)
NRBC AUTOMATED: 0 PER 100 WBC
PDW BLD-RTO: 17.2 % (ref 11.8–14.4)
PLATELET # BLD: 89 K/UL (ref 138–453)
PLATELET ESTIMATE: ABNORMAL
PMV BLD AUTO: 10.9 FL (ref 8.1–13.5)
POTASSIUM SERPL-SCNC: 4.3 MMOL/L (ref 3.7–5.3)
PROTHROMBIN TIME: 22.3 SEC (ref 9.7–11.6)
RBC # BLD: 4.35 M/UL (ref 4.21–5.77)
RBC # BLD: ABNORMAL 10*6/UL
SEDIMENTATION RATE, ERYTHROCYTE: 27 MM (ref 0–15)
SEG NEUTROPHILS: 67 % (ref 36–65)
SEGMENTED NEUTROPHILS ABSOLUTE COUNT: 4.86 K/UL (ref 1.5–8.1)
SODIUM BLD-SCNC: 138 MMOL/L (ref 135–144)
WBC # BLD: 7.3 K/UL (ref 3.5–11.3)
WBC # BLD: ABNORMAL 10*3/UL

## 2020-01-16 PROCEDURE — 85610 PROTHROMBIN TIME: CPT

## 2020-01-16 PROCEDURE — 80048 BASIC METABOLIC PNL TOTAL CA: CPT

## 2020-01-16 PROCEDURE — 86140 C-REACTIVE PROTEIN: CPT

## 2020-01-16 PROCEDURE — P9603 ONE-WAY ALLOW PRORATED MILES: HCPCS

## 2020-01-16 PROCEDURE — 36415 COLL VENOUS BLD VENIPUNCTURE: CPT

## 2020-01-16 PROCEDURE — 85025 COMPLETE CBC W/AUTO DIFF WBC: CPT

## 2020-01-16 PROCEDURE — 85651 RBC SED RATE NONAUTOMATED: CPT

## 2020-01-20 ENCOUNTER — HOSPITAL ENCOUNTER (OUTPATIENT)
Age: 50
Setting detail: SPECIMEN
Discharge: HOME | End: 2020-01-20
Payer: COMMERCIAL

## 2020-01-20 LAB
INR BLD: 2.3
PROTHROMBIN TIME: 22.8 SEC (ref 9.7–11.6)

## 2020-01-20 PROCEDURE — P9603 ONE-WAY ALLOW PRORATED MILES: HCPCS

## 2020-01-20 PROCEDURE — 36415 COLL VENOUS BLD VENIPUNCTURE: CPT

## 2020-01-20 PROCEDURE — 85610 PROTHROMBIN TIME: CPT

## 2020-01-23 ENCOUNTER — HOSPITAL ENCOUNTER (OUTPATIENT)
Age: 50
Setting detail: SPECIMEN
Discharge: HOME | End: 2020-01-23
Payer: COMMERCIAL

## 2020-01-23 LAB
INR BLD: 2.3
PROTHROMBIN TIME: 23.1 SEC (ref 9.7–11.6)

## 2020-01-23 PROCEDURE — 36415 COLL VENOUS BLD VENIPUNCTURE: CPT

## 2020-01-23 PROCEDURE — 85610 PROTHROMBIN TIME: CPT

## 2020-01-23 PROCEDURE — P9603 ONE-WAY ALLOW PRORATED MILES: HCPCS

## 2020-01-27 ENCOUNTER — HOSPITAL ENCOUNTER (OUTPATIENT)
Age: 50
Setting detail: SPECIMEN
Discharge: HOME | End: 2020-01-27
Payer: COMMERCIAL

## 2020-01-27 LAB
INR BLD: 2.1
PROTHROMBIN TIME: 20.5 SEC (ref 9.7–11.6)

## 2020-01-27 PROCEDURE — P9603 ONE-WAY ALLOW PRORATED MILES: HCPCS

## 2020-01-27 PROCEDURE — 85610 PROTHROMBIN TIME: CPT

## 2020-01-27 PROCEDURE — 36415 COLL VENOUS BLD VENIPUNCTURE: CPT

## 2020-01-29 ENCOUNTER — HOSPITAL ENCOUNTER (OUTPATIENT)
Age: 50
Setting detail: SPECIMEN
Discharge: HOME | End: 2020-01-29
Payer: COMMERCIAL

## 2020-01-29 LAB
ANION GAP SERPL CALCULATED.3IONS-SCNC: 11 MMOL/L (ref 9–17)
BUN BLDV-MCNC: 26 MG/DL (ref 6–20)
BUN/CREAT BLD: 21 (ref 9–20)
C-REACTIVE PROTEIN: 15.3 MG/L (ref 0–5)
CALCIUM SERPL-MCNC: 9.1 MG/DL (ref 8.6–10.4)
CHLORIDE BLD-SCNC: 99 MMOL/L (ref 98–107)
CO2: 29 MMOL/L (ref 20–31)
CREAT SERPL-MCNC: 1.25 MG/DL (ref 0.7–1.2)
GFR AFRICAN AMERICAN: >60 ML/MIN
GFR NON-AFRICAN AMERICAN: >60 ML/MIN
GFR SERPL CREATININE-BSD FRML MDRD: ABNORMAL ML/MIN/{1.73_M2}
GFR SERPL CREATININE-BSD FRML MDRD: ABNORMAL ML/MIN/{1.73_M2}
GLUCOSE BLD-MCNC: 126 MG/DL (ref 70–99)
HCT VFR BLD CALC: 40.2 % (ref 40.7–50.3)
HEMOGLOBIN: 12.1 G/DL (ref 13–17)
MCH RBC QN AUTO: 26.4 PG (ref 25.2–33.5)
MCHC RBC AUTO-ENTMCNC: 30.1 G/DL (ref 28.4–34.8)
MCV RBC AUTO: 87.8 FL (ref 82.6–102.9)
NRBC AUTOMATED: 0 PER 100 WBC
PDW BLD-RTO: 17.2 % (ref 11.8–14.4)
PLATELET # BLD: 185 K/UL (ref 138–453)
PMV BLD AUTO: 11 FL (ref 8.1–13.5)
POTASSIUM SERPL-SCNC: 4.1 MMOL/L (ref 3.7–5.3)
RBC # BLD: 4.58 M/UL (ref 4.21–5.77)
SEDIMENTATION RATE, ERYTHROCYTE: 19 MM (ref 0–10)
SODIUM BLD-SCNC: 139 MMOL/L (ref 135–144)
WBC # BLD: 7.6 K/UL (ref 3.5–11.3)

## 2020-01-29 PROCEDURE — 85651 RBC SED RATE NONAUTOMATED: CPT

## 2020-01-29 PROCEDURE — 85027 COMPLETE CBC AUTOMATED: CPT

## 2020-01-29 PROCEDURE — 36415 COLL VENOUS BLD VENIPUNCTURE: CPT

## 2020-01-29 PROCEDURE — 80048 BASIC METABOLIC PNL TOTAL CA: CPT

## 2020-01-29 PROCEDURE — P9603 ONE-WAY ALLOW PRORATED MILES: HCPCS

## 2020-01-29 PROCEDURE — 86140 C-REACTIVE PROTEIN: CPT

## 2020-01-30 ENCOUNTER — HOSPITAL ENCOUNTER (OUTPATIENT)
Age: 50
Setting detail: SPECIMEN
Discharge: HOME | End: 2020-01-30
Payer: COMMERCIAL

## 2020-01-30 LAB
INR BLD: 1.8
PROTHROMBIN TIME: 17.7 SEC (ref 9.7–11.6)

## 2020-01-30 PROCEDURE — 36415 COLL VENOUS BLD VENIPUNCTURE: CPT

## 2020-01-30 PROCEDURE — P9603 ONE-WAY ALLOW PRORATED MILES: HCPCS

## 2020-01-30 PROCEDURE — 85610 PROTHROMBIN TIME: CPT

## 2020-03-15 ENCOUNTER — HOSPITAL ENCOUNTER (OUTPATIENT)
Age: 50
Setting detail: SPECIMEN
Discharge: HOME | End: 2020-03-15
Payer: COMMERCIAL

## 2020-03-15 LAB
INR BLD: 1.5
PROTHROMBIN TIME: 15.1 SEC (ref 9.7–11.6)

## 2020-03-15 PROCEDURE — P9603 ONE-WAY ALLOW PRORATED MILES: HCPCS

## 2020-03-15 PROCEDURE — 85610 PROTHROMBIN TIME: CPT

## 2020-03-15 PROCEDURE — 36415 COLL VENOUS BLD VENIPUNCTURE: CPT

## 2020-03-16 ENCOUNTER — HOSPITAL ENCOUNTER (OUTPATIENT)
Age: 50
Setting detail: SPECIMEN
Discharge: HOME | End: 2020-03-16
Payer: COMMERCIAL

## 2020-03-16 LAB
INR BLD: 1.3
PROTHROMBIN TIME: 13.1 SEC (ref 9–12)

## 2020-03-16 PROCEDURE — 36415 COLL VENOUS BLD VENIPUNCTURE: CPT

## 2020-03-16 PROCEDURE — 85610 PROTHROMBIN TIME: CPT

## 2020-03-16 PROCEDURE — P9603 ONE-WAY ALLOW PRORATED MILES: HCPCS

## 2020-03-19 ENCOUNTER — HOSPITAL ENCOUNTER (OUTPATIENT)
Age: 50
Setting detail: SPECIMEN
Discharge: HOME | End: 2020-03-19
Payer: COMMERCIAL

## 2020-03-19 LAB
INR BLD: 1.4
PROTHROMBIN TIME: 14.5 SEC (ref 9–12)

## 2020-03-19 PROCEDURE — P9603 ONE-WAY ALLOW PRORATED MILES: HCPCS

## 2020-03-19 PROCEDURE — 36415 COLL VENOUS BLD VENIPUNCTURE: CPT

## 2020-03-19 PROCEDURE — 85610 PROTHROMBIN TIME: CPT

## 2020-03-20 ENCOUNTER — HOSPITAL ENCOUNTER (OUTPATIENT)
Age: 50
Setting detail: SPECIMEN
Discharge: HOME | End: 2020-03-20
Payer: COMMERCIAL

## 2020-03-20 LAB
ANION GAP SERPL CALCULATED.3IONS-SCNC: 13 MMOL/L (ref 9–17)
BUN BLDV-MCNC: 20 MG/DL (ref 6–20)
BUN/CREAT BLD: ABNORMAL (ref 9–20)
CALCIUM SERPL-MCNC: 8.9 MG/DL (ref 8.6–10.4)
CHLORIDE BLD-SCNC: 98 MMOL/L (ref 98–107)
CO2: 27 MMOL/L (ref 20–31)
CREAT SERPL-MCNC: 1.45 MG/DL (ref 0.7–1.2)
GFR AFRICAN AMERICAN: >60 ML/MIN
GFR NON-AFRICAN AMERICAN: 52 ML/MIN
GFR SERPL CREATININE-BSD FRML MDRD: ABNORMAL ML/MIN/{1.73_M2}
GFR SERPL CREATININE-BSD FRML MDRD: ABNORMAL ML/MIN/{1.73_M2}
GLUCOSE BLD-MCNC: 89 MG/DL (ref 70–99)
HCT VFR BLD CALC: 37.6 % (ref 40.7–50.3)
HEMOGLOBIN: 11 G/DL (ref 13–17)
MCH RBC QN AUTO: 26 PG (ref 25.2–33.5)
MCHC RBC AUTO-ENTMCNC: 29.3 G/DL (ref 28.4–34.8)
MCV RBC AUTO: 88.9 FL (ref 82.6–102.9)
NRBC AUTOMATED: 0 PER 100 WBC
PDW BLD-RTO: 16.4 % (ref 11.8–14.4)
PLATELET # BLD: 167 K/UL (ref 138–453)
PMV BLD AUTO: 10.9 FL (ref 8.1–13.5)
POTASSIUM SERPL-SCNC: 4.1 MMOL/L (ref 3.7–5.3)
RBC # BLD: 4.23 M/UL (ref 4.21–5.77)
SODIUM BLD-SCNC: 138 MMOL/L (ref 135–144)
WBC # BLD: 8.2 K/UL (ref 3.5–11.3)

## 2020-03-20 PROCEDURE — 85027 COMPLETE CBC AUTOMATED: CPT

## 2020-03-20 PROCEDURE — 80048 BASIC METABOLIC PNL TOTAL CA: CPT

## 2020-03-20 PROCEDURE — P9603 ONE-WAY ALLOW PRORATED MILES: HCPCS

## 2020-03-20 PROCEDURE — 36415 COLL VENOUS BLD VENIPUNCTURE: CPT

## 2020-03-23 ENCOUNTER — HOSPITAL ENCOUNTER (OUTPATIENT)
Age: 50
Setting detail: SPECIMEN
Discharge: HOME | End: 2020-03-23
Payer: COMMERCIAL

## 2020-03-23 LAB
INR BLD: 2.8
PROTHROMBIN TIME: 28 SEC (ref 9–12)

## 2020-03-23 PROCEDURE — P9603 ONE-WAY ALLOW PRORATED MILES: HCPCS

## 2020-03-23 PROCEDURE — 36415 COLL VENOUS BLD VENIPUNCTURE: CPT

## 2020-03-23 PROCEDURE — 85610 PROTHROMBIN TIME: CPT

## 2020-03-26 ENCOUNTER — HOSPITAL ENCOUNTER (OUTPATIENT)
Age: 50
Setting detail: SPECIMEN
Discharge: HOME | End: 2020-03-26
Payer: COMMERCIAL

## 2020-03-26 LAB
INR BLD: 3.7
PROTHROMBIN TIME: 36.7 SEC (ref 9–12)

## 2020-03-26 PROCEDURE — 85610 PROTHROMBIN TIME: CPT

## 2020-03-26 PROCEDURE — 36415 COLL VENOUS BLD VENIPUNCTURE: CPT

## 2020-03-26 PROCEDURE — P9603 ONE-WAY ALLOW PRORATED MILES: HCPCS

## 2020-10-01 ENCOUNTER — HOSPITAL ENCOUNTER (EMERGENCY)
Age: 50
Discharge: HOME | End: 2020-10-01
Attending: EMERGENCY MEDICINE | Admitting: EMERGENCY MEDICINE
Payer: COMMERCIAL

## 2020-10-01 ENCOUNTER — APPOINTMENT (OUTPATIENT)
Dept: GENERAL RADIOLOGY | Age: 50
End: 2020-10-01
Payer: COMMERCIAL

## 2020-10-01 VITALS
WEIGHT: 315 LBS | HEART RATE: 85 BPM | RESPIRATION RATE: 20 BRPM | DIASTOLIC BLOOD PRESSURE: 69 MMHG | OXYGEN SATURATION: 95 % | TEMPERATURE: 98.6 F | BODY MASS INDEX: 41.75 KG/M2 | SYSTOLIC BLOOD PRESSURE: 121 MMHG | HEIGHT: 73 IN

## 2020-10-01 PROBLEM — L03.90 CELLULITIS: Status: ACTIVE | Noted: 2020-10-01

## 2020-10-01 LAB
ABSOLUTE EOS #: 0.5 K/UL (ref 0–0.44)
ABSOLUTE IMMATURE GRANULOCYTE: 0.09 K/UL (ref 0–0.3)
ABSOLUTE LYMPH #: 1.48 K/UL (ref 1.1–3.7)
ABSOLUTE MONO #: 1.08 K/UL (ref 0.1–1.2)
ANION GAP SERPL CALCULATED.3IONS-SCNC: 10 MMOL/L (ref 9–17)
BASOPHILS # BLD: 0 % (ref 0–2)
BASOPHILS ABSOLUTE: <0.03 K/UL (ref 0–0.2)
BUN BLDV-MCNC: 36 MG/DL (ref 6–20)
BUN/CREAT BLD: ABNORMAL (ref 9–20)
C-REACTIVE PROTEIN: 266 MG/L (ref 0–5)
CALCIUM SERPL-MCNC: 8.7 MG/DL (ref 8.6–10.4)
CHLORIDE BLD-SCNC: 102 MMOL/L (ref 98–107)
CO2: 25 MMOL/L (ref 20–31)
CREAT SERPL-MCNC: 1.95 MG/DL (ref 0.7–1.2)
DIFFERENTIAL TYPE: ABNORMAL
EOSINOPHILS RELATIVE PERCENT: 4 % (ref 1–4)
GFR AFRICAN AMERICAN: 45 ML/MIN
GFR NON-AFRICAN AMERICAN: 37 ML/MIN
GFR SERPL CREATININE-BSD FRML MDRD: ABNORMAL ML/MIN/{1.73_M2}
GFR SERPL CREATININE-BSD FRML MDRD: ABNORMAL ML/MIN/{1.73_M2}
GLUCOSE BLD-MCNC: 124 MG/DL (ref 70–99)
HCT VFR BLD CALC: 31.8 % (ref 40.7–50.3)
HEMOGLOBIN: 9.4 G/DL (ref 13–17)
IMMATURE GRANULOCYTES: 1 %
LACTIC ACID, WHOLE BLOOD: 1.1 MMOL/L (ref 0.7–2.1)
LACTIC ACID: NORMAL MMOL/L
LYMPHOCYTES # BLD: 13 % (ref 24–43)
MCH RBC QN AUTO: 25.5 PG (ref 25.2–33.5)
MCHC RBC AUTO-ENTMCNC: 29.6 G/DL (ref 28.4–34.8)
MCV RBC AUTO: 86.4 FL (ref 82.6–102.9)
MONOCYTES # BLD: 9 % (ref 3–12)
NRBC AUTOMATED: 0 PER 100 WBC
PDW BLD-RTO: 17 % (ref 11.8–14.4)
PLATELET # BLD: 186 K/UL (ref 138–453)
PLATELET ESTIMATE: ABNORMAL
PMV BLD AUTO: 11.3 FL (ref 8.1–13.5)
POTASSIUM SERPL-SCNC: 4.5 MMOL/L (ref 3.7–5.3)
RBC # BLD: 3.68 M/UL (ref 4.21–5.77)
RBC # BLD: ABNORMAL 10*6/UL
SEDIMENTATION RATE, ERYTHROCYTE: 130 MM (ref 0–15)
SEG NEUTROPHILS: 73 % (ref 36–65)
SEGMENTED NEUTROPHILS ABSOLUTE COUNT: 8.44 K/UL (ref 1.5–8.1)
SODIUM BLD-SCNC: 137 MMOL/L (ref 135–144)
WBC # BLD: 11.6 K/UL (ref 3.5–11.3)
WBC # BLD: ABNORMAL 10*3/UL

## 2020-10-01 PROCEDURE — 85652 RBC SED RATE AUTOMATED: CPT

## 2020-10-01 PROCEDURE — 2580000003 HC RX 258: Performed by: STUDENT IN AN ORGANIZED HEALTH CARE EDUCATION/TRAINING PROGRAM

## 2020-10-01 PROCEDURE — 99285 EMERGENCY DEPT VISIT HI MDM: CPT

## 2020-10-01 PROCEDURE — 97162 PT EVAL MOD COMPLEX 30 MIN: CPT

## 2020-10-01 PROCEDURE — 86140 C-REACTIVE PROTEIN: CPT

## 2020-10-01 PROCEDURE — 6370000000 HC RX 637 (ALT 250 FOR IP): Performed by: STUDENT IN AN ORGANIZED HEALTH CARE EDUCATION/TRAINING PROGRAM

## 2020-10-01 PROCEDURE — 73630 X-RAY EXAM OF FOOT: CPT

## 2020-10-01 PROCEDURE — 83605 ASSAY OF LACTIC ACID: CPT

## 2020-10-01 PROCEDURE — 73590 X-RAY EXAM OF LOWER LEG: CPT

## 2020-10-01 PROCEDURE — 97166 OT EVAL MOD COMPLEX 45 MIN: CPT

## 2020-10-01 PROCEDURE — 80048 BASIC METABOLIC PNL TOTAL CA: CPT

## 2020-10-01 PROCEDURE — G0378 HOSPITAL OBSERVATION PER HR: HCPCS

## 2020-10-01 PROCEDURE — 97530 THERAPEUTIC ACTIVITIES: CPT

## 2020-10-01 PROCEDURE — 99213 OFFICE O/P EST LOW 20 MIN: CPT

## 2020-10-01 PROCEDURE — 85025 COMPLETE CBC W/AUTO DIFF WBC: CPT

## 2020-10-01 RX ORDER — ACETAMINOPHEN 325 MG/1
650 TABLET ORAL EVERY 6 HOURS PRN
Status: CANCELLED | OUTPATIENT
Start: 2020-10-01

## 2020-10-01 RX ORDER — DOXYCYCLINE HYCLATE 100 MG
100 TABLET ORAL ONCE
Status: COMPLETED | OUTPATIENT
Start: 2020-10-01 | End: 2020-10-01

## 2020-10-01 RX ORDER — ACETAMINOPHEN 650 MG/1
650 SUPPOSITORY RECTAL EVERY 6 HOURS PRN
Status: CANCELLED | OUTPATIENT
Start: 2020-10-01

## 2020-10-01 RX ORDER — LISINOPRIL 20 MG/1
20 TABLET ORAL DAILY
COMMUNITY

## 2020-10-01 RX ORDER — LISINOPRIL 10 MG/1
20 TABLET ORAL DAILY
Status: CANCELLED | OUTPATIENT
Start: 2020-10-01

## 2020-10-01 RX ORDER — OXYCODONE HYDROCHLORIDE 5 MG/1
5 TABLET ORAL ONCE
Status: COMPLETED | OUTPATIENT
Start: 2020-10-01 | End: 2020-10-01

## 2020-10-01 RX ORDER — POTASSIUM CHLORIDE 20 MEQ/1
20 TABLET, EXTENDED RELEASE ORAL 2 TIMES DAILY
Status: CANCELLED | OUTPATIENT
Start: 2020-10-01

## 2020-10-01 RX ORDER — BUMETANIDE 1 MG/1
2 TABLET ORAL 2 TIMES DAILY
Status: CANCELLED | OUTPATIENT
Start: 2020-10-01

## 2020-10-01 RX ORDER — ONDANSETRON 2 MG/ML
4 INJECTION INTRAMUSCULAR; INTRAVENOUS EVERY 6 HOURS PRN
Status: CANCELLED | OUTPATIENT
Start: 2020-10-01

## 2020-10-01 RX ORDER — POLYETHYLENE GLYCOL 3350 17 G/17G
17 POWDER, FOR SOLUTION ORAL DAILY PRN
Status: CANCELLED | OUTPATIENT
Start: 2020-10-01

## 2020-10-01 RX ORDER — BUSPIRONE HYDROCHLORIDE 15 MG/1
15 TABLET ORAL 2 TIMES DAILY
Status: CANCELLED | OUTPATIENT
Start: 2020-10-01

## 2020-10-01 RX ORDER — CARVEDILOL 12.5 MG/1
25 TABLET ORAL 2 TIMES DAILY WITH MEALS
Status: CANCELLED | OUTPATIENT
Start: 2020-10-01

## 2020-10-01 RX ORDER — BUMETANIDE 2 MG/1
2 TABLET ORAL 2 TIMES DAILY
COMMUNITY

## 2020-10-01 RX ORDER — DOXYCYCLINE HYCLATE 100 MG
100 TABLET ORAL EVERY 12 HOURS SCHEDULED
Status: CANCELLED | OUTPATIENT
Start: 2020-10-01

## 2020-10-01 RX ORDER — 0.9 % SODIUM CHLORIDE 0.9 %
1000 INTRAVENOUS SOLUTION INTRAVENOUS ONCE
Status: COMPLETED | OUTPATIENT
Start: 2020-10-01 | End: 2020-10-01

## 2020-10-01 RX ORDER — SODIUM CHLORIDE 0.9 % (FLUSH) 0.9 %
10 SYRINGE (ML) INJECTION EVERY 12 HOURS SCHEDULED
Status: CANCELLED | OUTPATIENT
Start: 2020-10-01

## 2020-10-01 RX ORDER — SODIUM CHLORIDE 0.9 % (FLUSH) 0.9 %
10 SYRINGE (ML) INJECTION PRN
Status: CANCELLED | OUTPATIENT
Start: 2020-10-01

## 2020-10-01 RX ORDER — POTASSIUM CHLORIDE 20 MEQ/1
20 TABLET, EXTENDED RELEASE ORAL 2 TIMES DAILY
COMMUNITY

## 2020-10-01 RX ORDER — ARIPIPRAZOLE 15 MG/1
15 TABLET ORAL DAILY
Status: CANCELLED | OUTPATIENT
Start: 2020-10-01

## 2020-10-01 RX ORDER — BUSPIRONE HYDROCHLORIDE 15 MG/1
15 TABLET ORAL 2 TIMES DAILY
COMMUNITY

## 2020-10-01 RX ORDER — HYDRALAZINE HYDROCHLORIDE 50 MG/1
50 TABLET, FILM COATED ORAL 3 TIMES DAILY
Status: CANCELLED | OUTPATIENT
Start: 2020-10-01

## 2020-10-01 RX ORDER — OXYCODONE HYDROCHLORIDE 5 MG/1
5 TABLET ORAL EVERY 6 HOURS PRN
Qty: 12 TABLET | Refills: 0 | Status: SHIPPED | OUTPATIENT
Start: 2020-10-01 | End: 2020-10-04

## 2020-10-01 RX ORDER — AMLODIPINE BESYLATE 10 MG/1
10 TABLET ORAL 2 TIMES DAILY
Status: CANCELLED | OUTPATIENT
Start: 2020-10-01

## 2020-10-01 RX ORDER — ALLOPURINOL 300 MG/1
300 TABLET ORAL DAILY
COMMUNITY

## 2020-10-01 RX ORDER — OXYCODONE HYDROCHLORIDE 5 MG/1
5 TABLET ORAL EVERY 6 HOURS PRN
Status: CANCELLED | OUTPATIENT
Start: 2020-10-01

## 2020-10-01 RX ADMIN — OXYCODONE HYDROCHLORIDE 5 MG: 5 TABLET ORAL at 11:07

## 2020-10-01 RX ADMIN — DOXYCYCLINE HYCLATE 100 MG: 100 TABLET, COATED ORAL at 12:09

## 2020-10-01 RX ADMIN — SODIUM CHLORIDE 1000 ML: 9 INJECTION, SOLUTION INTRAVENOUS at 07:39

## 2020-10-01 RX ADMIN — OXYCODONE HYDROCHLORIDE 5 MG: 5 TABLET ORAL at 09:20

## 2020-10-01 ASSESSMENT — PAIN DESCRIPTION - LOCATION
LOCATION: LEG

## 2020-10-01 ASSESSMENT — PAIN SCALES - GENERAL
PAINLEVEL_OUTOF10: 7
PAINLEVEL_OUTOF10: 7
PAINLEVEL_OUTOF10: 2
PAINLEVEL_OUTOF10: 4
PAINLEVEL_OUTOF10: 6
PAINLEVEL_OUTOF10: 4
PAINLEVEL_OUTOF10: 6

## 2020-10-01 ASSESSMENT — PAIN DESCRIPTION - ORIENTATION
ORIENTATION: RIGHT;LEFT
ORIENTATION: LEFT;RIGHT;LOWER
ORIENTATION: RIGHT;LEFT
ORIENTATION: LEFT;RIGHT
ORIENTATION: LEFT;RIGHT

## 2020-10-01 ASSESSMENT — PAIN DESCRIPTION - PAIN TYPE
TYPE: CHRONIC PAIN

## 2020-10-01 ASSESSMENT — PAIN DESCRIPTION - DESCRIPTORS: DESCRIPTORS: ACHING;DISCOMFORT;SORE;TENDER

## 2020-10-01 ASSESSMENT — PAIN DESCRIPTION - PROGRESSION: CLINICAL_PROGRESSION: GRADUALLY IMPROVING

## 2020-10-01 ASSESSMENT — PAIN DESCRIPTION - FREQUENCY: FREQUENCY: INTERMITTENT

## 2020-10-01 NOTE — ED NOTES
Pt arrived to ED alert and oriented x4. Pt c/o cellulitis and pain to BLE. Pt reports that the pain and cellulitis are chronic in nature, states he has had cellulitis for 3 years. Pt reports that [facility], states that they changed his dressings on BLE and he was sent home. Pt reports an increase in bleeding from the cellulitis, states \"My wife is sick of me bleeding everywhere\". Pt has dressings in place from below the knee around the heel of each foot bilaterally. Pt arrives with soiled pants and poor hygiene. Pt denies having been around anyone suspected to have COVID-19 or anyone that has been sick, denies recent travel outside the [facility] or [address] [place] [address] Rd[address]. Pt placed on continuous pulse ox and BP cuff. RR even and unlabored. NAD noted. Will continue monitor.      Gianfranco Means RN  10/01/20 3344

## 2020-10-01 NOTE — CARE COORDINATION
Met briefly with the patient, he did states that he stayed at [facility] in Feb, and wishes to have a referral to that facility. PT/OT: left message that patient needs to be seen for precert to a facility. Patient provided with [address] [place] [address],[address] provider list, for additional facilities. 10:00:  Call from Myra Allen at [facility], can accept the patient when PT/OT eval complete and patient has documentation stating that he has no signs of COVID, they will test at the facility. 1050:  PT/OT at bedside for evaluation. 1110: evals completed. , call placed to Myra Allen at [facility]. 1226:faxed MAR to Isi at 21 [phone]: call from [facility], patient has been approved by [employer]. Requesting a transfer after 2100, as they have ordered a bariatric bed for the patient. Patient informed of transfer to [address] [place] [address],     [facility] face [facility]: [phone]. Presently they are too in-fluxed with requests to give us transportation time. Perfect serve to ostomy for their recommendations re: cellulitis.

## 2020-10-01 NOTE — DISCHARGE INSTR - COC
Continuity of Care Form    Patient Name: Elin Pinzon   :  1970  MRN:  [id number]    Admit date:  10/1/2020  Discharge date:  10/2/2020    Code Status Order: Prior   Advance Directives:     Admitting Physician:  No admitting provider for patient encounter. PCP: No primary care provider on file. Discharging Nurse:   [address] Unit/Room#:   Discharging Unit Phone Number:     Emergency Contact:   Extended Emergency Contact Information  Primary Emergency Contact: Blaine Lagos [address] Phone: [phone]  Mobile Phone: [phone]  Relation: None    Past Surgical History:  History reviewed. No pertinent surgical history. Immunization History: There is no immunization history on file for this patient. Active Problems:  Patient Active Problem List   Diagnosis Code    Hypertensive urgency I16.0    JONNA (acute kidney injury) ([facility] [address] N17.9    Hypokalemia E87.6    PRIMO (obstructive sleep apnea) G47.33    Obesity (BMI 35.0-39.9 without comorbidity) E66.9    Prediabetes R73.03    Bipolar 1 disorder (HCC) F31.9    Hypertensive emergency I16.1    Iron deficiency anemia D50.9    Varicose veins of legs I83.93    Dermatitis L30.9       Isolation/Infection:   Isolation          No Isolation        Patient Infection Status     None to display          Nurse Assessment:  Last Vital Signs: /76   Pulse 80   Temp 98.6 °F (37 °C) (Oral)   Resp 17   Ht 6' 1\" (1.854 m)   Wt (!) 450 lb (204.1 kg)   SpO2 95%   BMI 59.37 kg/m²     Last documented pain score (0-10 scale): Pain Level: 2  Last Weight:   Wt Readings from Last 1 Encounters:   10/01/20 (!) 450 lb (204.1 kg)     Mental Status:  oriented and alert, slow to answer questions.     IV Access:  - None    Nursing Mobility/ADLs:  Walking   Assisted  Transfer  Assisted  Bathing  Assisted  Dressing  Assisted  Toileting  Assisted  Feeding  Independent  Med Admin  Assisted  Med Delivery   whole    Wound Care Documentation and Therapy:      Assessment:         Measurements:       Wound 10/01/20 Leg Right; Lower; Lateral (Active)   Wound Image   10/01/20 1619   Wound Venous 10/01/20 1619   Dressing Status Changed 10/01/20 1619   Dressing Changed Changed/New 10/01/20 1619   Dressing/Treatment Alginate with Ag;ABD;Roll gauze; Ace wrap 10/01/20 1619   Wound Cleansed Soap and water 10/01/20 1619   Dressing Change Due 10/03/20 10/01/20 1619   Wound Length (cm) 12.5 cm 10/01/20 1619   Wound Width (cm) 5.5 cm 10/01/20 1619   Wound Depth (cm) 0.1 cm 10/01/20 1619   Wound Surface Area (cm^2) 68.75 cm^2 10/01/20 1619   Wound Volume (cm^3) 6.88 cm^3 10/01/20 1619   Wound Assessment Red;Pink;Yellow;Drainage;Painful 10/01/20 1619   Drainage Amount Moderate 10/01/20 1619   Drainage Description Serosanguinous 10/01/20 1619   Odor None 10/01/20 1619   Bibi-wound Assessment Red;Edema 10/01/20 1619   Number of days: 0       Wound 10/01/20 Thigh Right;Posterior (Active)   Wound Image   10/01/20 1619   Wound Deep tissue/Injury 10/01/20 1619   Dressing Status Changed 10/01/20 1619   Dressing Changed Changed/New 10/01/20 1619   Dressing/Treatment Alginate with Ag; Foam 10/01/20 1619   Wound Cleansed Soap and water 10/01/20 1619   Dressing Change Due 10/03/20 10/01/20 1619   Wound Length (cm) 26.5 cm 10/01/20 1619   Wound Width (cm) 20 cm 10/01/20 1619   Wound Depth (cm) 0.1 cm 10/01/20 1619   Wound Surface Area (cm^2) 530 cm^2 10/01/20 1619   Wound Volume (cm^3) 53 cm^3 10/01/20 1619   Wound Assessment Bleeding;Drainage; Purple;Edema;Fragile 10/01/20 1619   Drainage Amount Moderate 10/01/20 1619   Drainage Description Serosanguinous 10/01/20 1619   Odor None 10/01/20 1619   Bibi-wound Assessment Intact; Blanchable erythema;Edema 10/01/20 1619   Number of days: 0         Plan of Care: Turn every 2 hours  Float heels off of bed with pillows under calves    Use lift sling to reposition patient to minimize potential for shear injury.     Silver alginate or hydrofiber under foam bordered dressing to right posterior thigh. Change every 48 hours. Silver aginate under ABD pad to the right lateral lower leg wound. Secure with roll gauze and ACE wrap from the base of the toes to the knee. Change every 48 hours. Use roll gauze and ACE wrap from the base of the toe to the knee of the left leg for edema control. Elimination:      Continence:   · Bowel: Yes  · Bladder: Yes  Urinary Catheter: None   Colostomy/Ileostomy/Ileal Conduit: No       Date of Last BM:     Intake/Output Summary (Last 24 hours) at 10/1/2020 1223  Last data filed at 10/1/2020 1100  Gross per 24 hour   Intake 600 ml   Output --   Net 600 ml     No intake/output data recorded. Safety Concerns: At Risk for Falls    Impairments/Disabilities:      None    Nutrition Therapy:  Current Nutrition Therapy:   - Oral Diet:  General    Routes of Feeding: Oral  Liquids: No Restrictions  Daily Fluid Restriction:   Last Modified Barium Swallow with Video (Video Swallowing Test):     Treatments at the Time of Hospital Discharge:   Respiratory Treatments:   Oxygen Therapy:  is not on home oxygen therapy. Ventilator:        Rehab Therapies: Physical Therapy, OT  Weight Bearing Status/Restrictions: No weight bearing restirctions  Other Medical Equipment (for information only, NOT a DME order): Other Treatments: see above for wound care.     Patient's personal belongings (please select all that are sent with patient):      RN SIGNATURE:  Electronically signed by Brittany Collazo RN on 10/1/20 at 4:56 PM EDT    CASE MANAGEMENT/SOCIAL WORK SECTION    Inpatient Status Date:   Readmission Risk Assessment Score:  Readmission Risk              Risk of Unplanned Readmission:        0           Discharging to Facility/ Agency   Name:   [address] [facility] [address]       Phone: [phone]       Fax: [phone]        ·     Dialysis Facility (if applicable) · Name:  · Address:  · Dialysis Schedule:  · Phone:  · Fax:    / signature: Electronically signed by Mode Lyons RN on 10/1/20 at 4:56 PM EDT    PHYSICIAN SECTION    Prognosis: Fair    Condition at Discharge: Stable    Rehab Potential (if transferring to Rehab): Fair    Recommended Labs or Other Treatments After Discharge: Continued oral doxycycline    Physician Certification: I certify the above information and transfer of Ayana Anderson  is necessary for the continuing treatment of the diagnosis listed and that he requires [facility] for greater 30 days.      Update Admission H&P: No change in H&P    PHYSICIAN SIGNATURE:  Electronically signed by German John MD on 10/1/20 at 3:36 PM EDT

## 2020-10-01 NOTE — PROGRESS NOTES
Physical Therapy    Facility/Department: [address] [place] [address] ED  Initial Assessment    NAME: Tara Porras  : 1970  MRN: [id number]    Date of Service: 10/1/2020  Discharge Recommendations:  Patient would benefit from continued therapy after discharge   Assessment   Body structures, Functions, Activity limitations: Decreased functional mobility ; Decreased strength;Decreased endurance; Increased pain  Assessment: The pt took 5-6 side steps with a RW x min assist. He reported increased pain in his LE's with mobilization. He could benefit from a continuation of PT following his DC  Prognosis: Good  Decision Making: Medium Complexity  PT Education: Goals;PT Role;Plan of Care  REQUIRES PT FOLLOW UP: Yes  Activity Tolerance  Activity Tolerance: Patient limited by fatigue;Patient limited by pain   Patient Diagnosis(es): There were no encounter diagnoses. has a past medical history of Bipolar 1 disorder ([facility] [address] and Hypertension. has no past surgical history on file.   Restrictions  Restrictions/Precautions  Restrictions/Precautions: General Precautions, Fall Risk  Required Braces or Orthoses?: No  Position Activity Restriction  Other position/activity restrictions: High BMI  Vision/Hearing  Vision: Within Functional Limits  Hearing: Within functional limits     Subjective  General  Patient assessed for rehabilitation services?: Yes  Response To Previous Treatment: Not applicable  Family / Caregiver Present: No  Follows Commands: Within Functional Limits  Subjective  Subjective: RN and pt agreeable to therapy  Pain Screening  Patient Currently in Pain: Yes  Pain Assessment  Pain Assessment: 0-10  Pain Level: 6  Pain Location: Leg  Pain Orientation: Right;Left  Vital Signs  Patient Currently in Pain: Yes    Orientation  Orientation  Overall Orientation Status: Within Normal Limits  Social/Functional History  Social/Functional History  Lives With: Spouse(and 15 yo child)  Type of Home: House  Home Layout: Two level, Able to Live on Main level with bedroom/bathroom, Performs ADL's on one level(1 1/2 story home, pt sleeps in recliner on main level)  Home Access: Stairs to enter with rails  Entrance Stairs - Number of Steps: 10  Entrance Stairs - Rails: Both  Bathroom Shower/Tub: Walk-in shower(However, sponge baths often in 1/2 bath)  Bathroom Toilet: Standard  Home Equipment: Rolling walker  ADL Assistance: [facility]: Independent  Homemaking Responsibilities: Yes  Ambulation Assistance: Independent(Has been using RW more recently)  Transfer Assistance: Independent  Active : No  Patient's  Info: Wife has been driving pt recently  Mode of Transportation: Family  Occupation: On disability  Cognition      Objective     AROM RLE (degrees)  RLE AROM: WFL  AROM LLE (degrees)  LLE AROM : WFL  AROM RUE (degrees)  RUE AROM : WFL  AROM LUE (degrees)  LUE AROM : WFL  Strength RLE  Strength RLE: WFL  Strength LLE  Strength LLE: WFL  Strength RUE  Strength RUE: WFL  Strength LUE  Strength LUE: WFL     Sensation  Overall Sensation Status: WFL  Bed mobility  Supine to Sit: Moderate assistance  Sit to Supine: Moderate assistance  Scooting: Moderate assistance  Transfers  Sit to Stand: Moderate Assistance  Stand to sit: Moderate Assistance  Ambulation  Ambulation?: Yes  Ambulation 1  Surface: level tile  Device: Rolling Walker  Assistance: Minimal assistance  Distance:  The pt took 5-6 side steps with a RW x min assist     Balance  Posture: Good  Sitting - Static: Good  Sitting - Dynamic: Poor  Standing - Static: Fair  Standing - Dynamic: [address]  Times per week: 5-6x wk  Current Treatment Recommendations: Strengthening, Functional Mobility Training, Transfer Training, ROM, Gait Training, Safety Education & Training, Endurance Training, Stair training, Pain Management, Positioning  Safety Devices  Type of devices: Nurse notified, Call light within reach, Left in bed    G-Code     OutComes Score     AM-PAC Score  AM-PAC Inpatient Mobility Raw Score : 11 (10/01/20 1128)  AM-PAC Inpatient T-Scale Score : 33.86 (10/01/20 1128)  Mobility Inpatient CMS 0-100% Score: 72.57 (10/01/20 1128)  Mobility Inpatient CMS G-Code Modifier : CL (10/01/20 1128)     Goals  Short term goals  Time Frame for Short term goals: 10 visits  Short term goal 1: transfers with SBA  Short term goal 2: amb 150 ft with a RW x SBA  Short term goal 3: ascend/descend 4 steps with SBA  Short term goal 4: exercise program x SBA  Patient Goals   Patient goals : Return home       Therapy Time   Individual Concurrent Group Co-treatment   Time In 6900         Time Out 1108         Minutes 23             1 of [address] [facility],

## 2020-10-01 NOTE — PROGRESS NOTES
Occupational Therapy   Occupational Therapy Initial Assessment  Date: 10/1/2020   Patient Name: Harish Shea  MRN: [id number]     : 1970    Date of Service: 10/1/2020    Discharge Recommendations: Pt will require a short rehab stay to address func mob/transfers and ADL/IADL concerns prior to return home. Patient would benefit from continued therapy after discharge  OT Equipment Recommendations  Other: CTA    Assessment   Performance deficits / Impairments: Decreased endurance;Decreased functional mobility ; Decreased ADL status; Decreased balance;Decreased strength;Decreased high-level IADLs  Prognosis: Good  Decision Making: Medium Complexity  OT Education: OT Role;Plan of Care  REQUIRES OT FOLLOW UP: Yes  Activity Tolerance  Activity Tolerance: Patient limited by fatigue;Patient limited by pain  Safety Devices  Safety Devices in place: Yes  Type of devices: All fall risk precautions in place;Call light within reach; Left in bed;Nurse notified  Restraints  Initially in place: No         Patient Diagnosis(es): There were no encounter diagnoses. has a past medical history of Bipolar 1 disorder ([facility] [address] and Hypertension. has no past surgical history on file. Restrictions  Restrictions/Precautions  Restrictions/Precautions: General Precautions, Fall Risk  Required Braces or Orthoses?: No  Position Activity Restriction  Other position/activity restrictions: High BMI    Subjective   General  Patient assessed for rehabilitation services?: Yes  Family / Caregiver Present: No  Diagnosis: BLE cellulitis, R tib/fib swelling, R 5th metatarsal deformity  Patient Currently in Pain: Yes  Pain Assessment  Pain Assessment: 0-10  Pain Level: 6  Pain Type: Chronic pain  Pain Location: Leg  Pain Orientation: Right;Left  Pain Descriptors: Aching;Discomfort;Sore;Tender  Pain Frequency: Intermittent  Non-Pharmaceutical Pain Intervention(s): Distraction; Emotional support; Therapeutic presence; Ambulation/Increased Activity  Response to Pain Intervention: Patient Satisfied  Vital Signs  Pulse: 80  Resp: 17  BP: 129/76  MAP (mmHg): 92  Level of Consciousness: Alert  Patient Currently in Pain: Yes  Oxygen Therapy  SpO2: 95 %  O2 Device: None (Room air)  Social/Functional History  Social/Functional History  Lives With: Spouse(and 15 yo child)  Type of Home: House  Home Layout: Two level, Able to Live on Main level with bedroom/bathroom, Performs ADL's on one level(1 1/2 story home, pt sleeps in recliner on main level)  Home Access: Stairs to enter with rails  Entrance Stairs - Number of Steps: 10  Entrance Stairs - Rails: Both  Bathroom Shower/Tub: Walk-in shower(However, sponge baths often in 1/2 bath)  Bathroom Toilet: Standard  Home Equipment: Rolling walker  ADL Assistance: Independent  Homemaking Assistance: Independent  Homemaking Responsibilities: Yes  Ambulation Assistance: Independent(Has been using RW more recently)  Transfer Assistance: Independent  Active : No  Patient's  Info: Wife has been driving pt recently  Mode of Transportation: Family  Occupation: On disability     Objective   Vision: Within Functional Limits  Hearing: Within functional limits    Orientation  Overall Orientation Status: Within Functional Limits     Balance  Sitting Balance: Stand by assistance  Standing Balance: Contact guard assistance  Standing Balance  Time: 3 min  Activity: Pt stood bedside, side-steps to R at [employer]  Functional Mobility  Functional - Mobility Device: Rolling Walker  Activity: Other  Assist Level: Minimal assistance  Functional Mobility Comments: Slow pace, fall risk, fatigues quickly, painful BLE's  ADL  Feeding: Setup; Increased time to complete;Supervision  Grooming: Setup; Increased time to complete;Stand by assistance  UE Bathing: Setup; Increased time to complete;Contact guard assistance  LE Bathing: Setup; Increased time to complete; Moderate assistance  UE Dressing: Setup; Increased time to complete;Minimal assistance  LE Dressing: Setup; Increased time to complete;Maximum assistance  Toileting: Setup; Increased time to complete;Maximum assistance  Additional Comments: Pt will require max A for all LB and toileting ADL's d/t pain in BLE's, high BMI, and fatigue, some UB weakness also noted  Tone RUE  RUE Tone: Normotonic  Tone LUE  LUE Tone: Normotonic  Coordination  Movements Are Fluid And Coordinated: No  Coordination and Movement description: Decreased speed     Bed mobility  Supine to Sit: Minimal assistance(Assist needed with LLE management)  Sit to Supine:  Moderate assistance  Scooting: Contact guard assistance  Comment: Pt supine in stretcher style bed upon arrival/exit this date  Transfers  Sit to stand: Minimal assistance  Stand to sit: Contact guard assistance     Cognition  Overall Cognitive Status: WFL  Cognition Comment: However, pt moaning often despite stating a 4/10 pain level, CTA     Sensation  Overall Sensation Status: WFL      LUE AROM (degrees)  LUE AROM : WFL  RUE AROM (degrees)  RUE AROM : WFL  LUE Strength  Gross LUE Strength: WFL  L Shoulder Flex: 4+/5  L Elbow Flex: 4+/5  L Elbow Ext: 4+/5  L Hand General: 4+/5  RUE Strength  Gross RUE Strength: Exceptions to [facility] Shoulder Flex: 4+/5  R Elbow Flex: 4+/5  R Elbow Ext: 4/5  R Hand General: 4+/5      Plan   Plan  Times per week: 3-4x  AM-PAC Score        AM-[facility] Inpatient Daily Activity Raw Score: 16 (10/01/20 1132)  AM-PAC Inpatient ADL T-Scale Score : 35.96 (10/01/20 1132)  ADL Inpatient CMS 0-100% Score: 53.32 (10/01/20 1132)  ADL Inpatient CMS G-Code Modifier : CK (10/01/20 1132)    Goals  Short term goals  Time Frame for Short term goals: Pt will by discharge  Short term goal 1: demo good safety awareness during func mob around room using LRD and SUP  Short term goal 2: demo ADL UB bathing/dressing activity with setup, increased time, and mod I  Short term goal 3: demo ADL UB bathing/dressing activity with setup, increased time, AE PRN, and mod A  Short term goal 4: demo activity tolerance for 35 min+  Short term goal 5: identify 2 non-pharmacological pain-relieving tech's with 1 vc     Therapy Time   Individual Concurrent Group Co-treatment   Time In 1051         Time Out 1108         Minutes 17         Timed Code Treatment Minutes: 7 Minutes       Isabell Grant, OTR/L

## 2020-10-01 NOTE — ED NOTES
Case management at bedside to educate pt. On ETA for  Transport  Pt. Resting on stretcher, eyes open, RR even and non-labored  Pt.  Updated on POC  Will continue to monitor   Meal tray ordered      Keenan Huber RN  10/01/20 2928

## 2020-10-01 NOTE — ED PROVIDER NOTES
Spouse name: Not on file    Number of children: Not on file    Years of education: Not on file    Highest education level: Not on file   Occupational History    Not on file   Social Needs    Financial resource strain: Not on file    Food insecurity     Worry: Not on file     Inability: Not on file    Transportation needs     Medical: Not on file     Non-medical: Not on file   Tobacco Use    Smoking status: Never Smoker    Smokeless tobacco: Never Used   Substance and Sexual Activity    Alcohol use: Yes    Drug use: No    Sexual activity: Not on file   Lifestyle    Physical activity     Days per week: Not on file     Minutes per session: Not on file    Stress: Not on file   Relationships    Social connections     Talks on phone: Not on file     Gets together: Not on file     Attends Latter-day service: Not on file     Active member of club or organization: Not on file     Attends meetings of clubs or organizations: Not on file     Relationship status: Not on file    Intimate partner violence     Fear of current or ex partner: Not on file     Emotionally abused: Not on file     Physically abused: Not on file     Forced sexual activity: Not on file   Other Topics Concern    Not on file   Social History Narrative    Not on file       History reviewed. No pertinent family history. Allergies:  Patient has no known allergies. Home Medications:  Prior to Admission medications    Medication Sig Start Date End Date Taking?  Authorizing Provider   bumetanide (BUMEX) 2 MG tablet Take 2 mg by mouth 2 times daily   Yes Historical Provider, MD   potassium chloride (KLOR-CON M) 20 MEQ extended release tablet Take 20 mEq by mouth 2 times daily   Yes Historical Provider, MD   lisinopril (PRINIVIL;ZESTRIL) 20 MG tablet Take 20 mg by mouth daily   Yes Historical Provider, MD   allopurinol (ZYLOPRIM) 300 MG tablet Take 300 mg by mouth daily   Yes Historical Provider, MD   busPIRone (BUSPAR) 15 MG tablet Take 15 mg Pulse 76   Temp 98.6 °F (37 °C) (Oral)   Resp 22   Ht 6' 1\" (1.854 m)   Wt (!) 450 lb (204.1 kg)   SpO2 98%   BMI 59.37 kg/m²     Physical Exam  Constitutional:       General: He is not in acute distress. Appearance: He is well-developed. He is obese. He is not toxic-appearing or diaphoretic. Comments: Patient is morbidly obese, limited mobility, slow to answer questions, appears fatigued, no acute distress, nontoxic appearing. HENT:      Head: Normocephalic and atraumatic. Right Ear: External ear normal.      Left Ear: External ear normal.   Eyes:      General:         Right eye: No discharge. Left eye: No discharge. Extraocular Movements: Extraocular movements intact. Pupils: Pupils are equal, round, and reactive to light. Neck:      Musculoskeletal: Normal range of motion and neck supple. No neck rigidity or muscular tenderness. Vascular: No JVD. Trachea: No tracheal deviation. Cardiovascular:      Rate and Rhythm: Normal rate and regular rhythm. Pulses: Normal pulses. Heart sounds: Normal heart sounds. No murmur. No friction rub. No gallop. Pulmonary:      Effort: Pulmonary effort is normal. No respiratory distress. Breath sounds: Normal breath sounds. No stridor. No wheezing, rhonchi or rales. Chest:      Chest wall: No tenderness. Abdominal:      General: There is no distension. Palpations: Abdomen is soft. Tenderness: There is no abdominal tenderness. There is no guarding. Comments: Morbidly obese, but nondistended, nontender abdomen   Musculoskeletal: Normal range of motion. Skin:     General: Skin is warm. Capillary Refill: Capillary refill takes less than 2 seconds. Findings: Erythema and lesion present. Comments: Patient has right lower extremity chronic leg changes consistent with cellulitis, actively weeping serosanguineous fluid from multiple areas, no evident purulence.    Neurological: Mental Status: He is alert and oriented to person, place, and time. Cranial Nerves: No cranial nerve deficit. Sensory: No sensory deficit. Psychiatric:      Comments: Very slow to answer questions, unkempt, no thought blocking         DIFFERENTIAL  DIAGNOSIS     PLAN (LABS / IMAGING / EKG):  Orders Placed This Encounter   Procedures    SPECIALTY BED REQUEST    XR FOOT RIGHT (MIN 3 VIEWS)    XR TIBIA FIBULA RIGHT (2 VIEWS)    CBC Auto Differential    Basic Metabolic Panel w/ Reflex to MG    C-Reactive Protein    Sedimentation Rate    Lactic Acid, Plasma    OT eval and treat    PT eval and treat    Wound ostomy eval and treat    PATIENT STATUS (FROM ED OR OR/PROCEDURAL) Observation       MEDICATIONS ORDERED:  Orders Placed This Encounter   Medications    0.9 % sodium chloride bolus    oxyCODONE (ROXICODONE) immediate release tablet 5 mg    oxyCODONE (ROXICODONE) immediate release tablet 5 mg    doxycycline hyclate (VIBRA-TABS) tablet 100 mg     Order Specific Question:   Antimicrobial Indications     Answer:   Skin and Soft Tissue Infection    oxyCODONE (ROXICODONE) 5 MG immediate release tablet     Sig: Take 1 tablet by mouth every 6 hours as needed for Pain for up to 3 days. Intended supply: 3 days.  Take lowest dose possible to manage pain     Dispense:  12 tablet     Refill:  0       DDX: Cellulitis    DIAGNOSTIC RESULTS / EMERGENCY DEPARTMENT COURSE / MDM   LAB RESULTS:  Results for orders placed or performed during the hospital encounter of 10/01/20   CBC Auto Differential   Result Value Ref Range    WBC 11.6 (H) 3.5 - 11.3 k/uL    RBC 3.68 (L) 4.21 - 5.77 m/uL    Hemoglobin 9.4 (L) 13.0 - 17.0 g/dL    Hematocrit 31.8 (L) 40.7 - 50.3 %    MCV 86.4 82.6 - 102.9 fL    MCH 25.5 25.2 - 33.5 pg    MCHC 29.6 28.4 - 34.8 g/dL    RDW 17.0 (H) 11.8 - 14.4 %    Platelets 432 626 - 404 k/uL    MPV 11.3 8.1 - 13.5 fL    NRBC Automated 0.0 0.0 per 100 WBC    Differential Type NOT REPORTED     Seg Neutrophils 73 (H) 36 - 65 %    Lymphocytes 13 (L) 24 - 43 %    Monocytes 9 3 - 12 %    Eosinophils % 4 1 - 4 %    Basophils 0 0 - 2 %    Immature Granulocytes 1 (H) 0 %    Segs Absolute 8.44 (H) 1.50 - 8.10 k/uL    Absolute Lymph # 1.48 1.10 - 3.70 k/uL    Absolute Mono # 1.08 0.10 - 1.20 k/uL    Absolute Eos # 0.50 (H) 0.00 - 0.44 k/uL    Basophils Absolute <0.03 0.00 - 0.20 k/uL    Absolute Immature Granulocyte 0.09 0.00 - 0.30 k/uL    WBC Morphology NOT REPORTED     RBC Morphology ANISOCYTOSIS PRESENT     Platelet Estimate NOT REPORTED    Basic Metabolic Panel w/ Reflex to MG   Result Value Ref Range    Glucose 124 (H) 70 - 99 mg/dL    BUN 36 (H) 6 - 20 mg/dL    CREATININE 1.95 (H) 0.70 - 1.20 mg/dL    Bun/Cre Ratio NOT REPORTED 9 - 20    Calcium 8.7 8.6 - 10.4 mg/dL    Sodium 137 135 - 144 mmol/L    Potassium 4.5 3.7 - 5.3 mmol/L    Chloride 102 98 - 107 mmol/L    CO2 25 20 - 31 mmol/L    Anion Gap 10 9 - 17 mmol/L    GFR Non-African American 37 (L) >60 mL/min    GFR  45 (L) >60 mL/min    GFR Comment          GFR Staging NOT REPORTED    C-Reactive Protein   Result Value Ref Range    .0 (H) 0.0 - 5.0 mg/L   Sedimentation Rate   Result Value Ref Range    Sed Rate 130 (H) 0 - 15 mm   Lactic Acid, Plasma   Result Value Ref Range    Lactic Acid NOT REPORTED mmol/L    Lactic Acid, Whole Blood 1.1 0.7 - 2.1 mmol/L       IMPRESSION: 82-year-old male with history of chronic right lower extremity cellulitis, actively following with wound care from [facility], presenting emergency department because his wife is unable to care for him at home anymore. Patient has obvious chronic cellulitis of his right lower extremity, no purulence noted, will obtain infectious labs, to include ESR, CRP, CBC, BMP, lactic acid. Patient has normal vital signs, afebrile, does not meet sirs criteria, will not order septic labs at this point.       RADIOLOGY:  Xr Tibia Fibula Right (2 Views)    Result Date: 10/1/2020  EXAMINATION: TWO XRAY VIEWS OF THE RIGHT TIBIA AND FIBULA; THREE XRAY VIEWS OF THE RIGHT FOOT 10/1/2020 8:16 am COMPARISON: None HISTORY: ORDERING SYSTEM PROVIDED HISTORY: cellulitis TECHNOLOGIST PROVIDED HISTORY: cellulitis Reason for Exam: cellulitis FINDINGS: Right tibia/fibula diffuse soft tissue swelling of the right lower extremity. No acute fracture or dislocation. Subtle cortical thickening along the distal fibula which may relate to prior stress injury. This is otherwise nonspecific. No acute fracture or dislocation. Joint spaces and alignment otherwise maintained. Right foot: Soft tissue swelling. There is deformity of the 5th metatarsal which may be related to prior infection/inflammation or surgery. This has a more chronic appearance although no prior exams are available for comparison. There is slightly increased lucency of the proximal phalanx of 5th digit without definite erosive changes. Joint spaces and alignment otherwise maintained. 1. Diffuse soft tissue swelling of the right tibia/fibula and right foot. 2. Deformity of the 5th metatarsal which may be related to prior infection or prior surgery. This has a more chronic rather than acute appearance. However no prior exams are available for comparison. There is slightly increased lucency of the proximal phalanx of the 5th digit without definite erosive changes and early osteomyelitis is not entirely excluded. 3. Otherwise no acute osseous abnormality in the right tibia/fibula or foot. Xr Foot Right (min 3 Views)    Result Date: 10/1/2020  EXAMINATION: TWO XRAY VIEWS OF THE RIGHT TIBIA AND FIBULA; THREE XRAY VIEWS OF THE RIGHT FOOT 10/1/2020 8:16 am COMPARISON: None HISTORY: ORDERING SYSTEM PROVIDED HISTORY: cellulitis TECHNOLOGIST PROVIDED HISTORY: cellulitis Reason for Exam: cellulitis FINDINGS: Right tibia/fibula diffuse soft tissue swelling of the right lower extremity. No acute fracture or dislocation.   Subtle cortical thickening along the distal fibula which may relate to prior stress injury. This is otherwise nonspecific. No acute fracture or dislocation. Joint spaces and alignment otherwise maintained. Right foot: Soft tissue swelling. There is deformity of the 5th metatarsal which may be related to prior infection/inflammation or surgery. This has a more chronic appearance although no prior exams are available for comparison. There is slightly increased lucency of the proximal phalanx of 5th digit without definite erosive changes. Joint spaces and alignment otherwise maintained. 1. Diffuse soft tissue swelling of the right tibia/fibula and right foot. 2. Deformity of the 5th metatarsal which may be related to prior infection or prior surgery. This has a more chronic rather than acute appearance. However no prior exams are available for comparison. There is slightly increased lucency of the proximal phalanx of the 5th digit without definite erosive changes and early osteomyelitis is not entirely excluded. 3. Otherwise no acute osseous abnormality in the right tibia/fibula or foot. EKG      All EKG's are interpreted by the Emergency Department Physician who either signs or Co-signs this chart in the absence of a cardiologist.    EMERGENCY DEPARTMENT COURSE:  Patient came to emergency department, HPI and physical exam were conducted. All nursing notes were reviewed. Patient has slight leukocytosis, elevated CRP and ESR, however patient was just started on doxycycline yesterday. Will continue treating patient with doxycycline 100 mg twice daily. Patient remained stable in the emergency department with normal vital signs. Patient is here mainly because his wife is unable to take care of him anymore at home. Patient is otherwise hemodynamically stable, patient has chronic cellulitis of the right lower extremity.   Low concern for osteomyelitis at this time we will continue to treat patient's cellulitis

## 2020-10-01 NOTE — ED NOTES
Writer attempted contact with [facility] wound clinic   Transferred to [facility], no message left     Ashley Knox RN  10/01/20 [id number]

## 2020-10-01 NOTE — CARE COORDINATION
Case Management Initial Discharge Plan  [facility],             Met with:patient to discuss discharge plans. Information verified: address, contacts, phone number, , insurance Yes    Emergency Contact/Next of Kin name & number: Anastasiya Lindquist [phone]    PCP: No primary care provider on file. Date of last visit: [place]      Insurance Provider:  sees Nancy Hall, last visit April    Discharge Planning    Living Arrangements:  Spouse/Significant Other, Children   Support Systems:  Spouse/Significant Other    Home has 2 stories  10 stairs to climb to get into front door, 1 flight stairs to climb to reach second floor  Location of bedroom/bathroom in home 1/2 bath on the 1st floor, bedroom on the 2nd floor, states that he sleeps on the 1st floor. Patient able to perform ADL's:Assisted    Current Services (outpatient & in home) none  DME equipment: recliner with lift, leg pumps that he markell uses, grab bars  DME provider:     Receiving oral anticoagulation therapy? Yes-Eliquis    If indicated:   Physician managing anticoagulation treatment:   Where does patient obtain lab work for ATC treatment? Potential Assistance Needed:  N/A    Patient agreeable to home care: No  [place] of choice provided:  n/a    Prior SNF/Rehab Placement and Facility: [facility] to SNF/Rehab: Yes  [place] of choice provided: yes     Evaluation: no    Expected Discharge date:  10/02/20    Patient expects to be discharged to:  patient plans to admit to [place], once precerted by insurance company  Follow Up Appointment: Best Day/ Time:      Transportation provider: wife  Transportation arrangements needed for discharge: No    Readmission Risk              Risk of Unplanned Readmission:        0             Does patient have a readmission risk score greater than 14?: not calculate. If yes, follow-up appointment must be made within 7 days of discharge.      Goals of Care:

## 2020-10-01 NOTE — ED PROVIDER NOTES
FACULTY SIGN-OUT  ADDENDUM       Patient: Darion Carvajal   MRN: [id number]  PCP:  No primary care provider on file. Attestation  I was available and discussed any additional care issues that arose and coordinated the management plans with the resident(s) caring for the patient during my duty period. Any areas of disagreement with resident's documentation of care or procedures are noted on the chart. I was personally present for the key portions of any/all procedures during my duty period. I have documented in the chart those procedures where I was not present during the key portions. The patient's initial evaluation and plan have been discussed with the prior provider who initially evaluated the patient. Pertinent Comments: The patient is a 54 y.o. male taken in signout with chronic lymphangitis/lymphedema with now secondary cellulitis elevated CRP and ESR. Nontoxic in appearance. Patient was initially attempted to obtain skilled nursing facility admission unfortunately as was unable to occur today. We are awaiting admission. Social work contacted us and we will be able to place the patient at [facility] skilled rehabilitation facility/nursing facility. LENY has been filled out by myself. Awaiting transport    ED COURSE      The patient was given the following medications:  Orders Placed This Encounter   Medications    0.9 % sodium chloride bolus    oxyCODONE (ROXICODONE) immediate release tablet 5 mg    oxyCODONE (ROXICODONE) immediate release tablet 5 mg    doxycycline hyclate (VIBRA-TABS) tablet 100 mg     Order Specific Question:   Antimicrobial Indications     Answer:   Skin and Soft Tissue Infection    oxyCODONE (ROXICODONE) 5 MG immediate release tablet     Sig: Take 1 tablet by mouth every 6 hours as needed for Pain for up to 3 days. Intended supply: 3 days.  Take lowest dose possible to manage pain     Dispense:  12 tablet     Refill:  0       RECENT VITALS:   BP: 121/78 Pulse: 76  Resp: 22  Temp: 98.6 °F (37 °C) SpO2: 98 %    (Please note that portions of this note were completed with a voice recognition program.  Efforts were made to edit the dictations but occasionally words are mis-transcribed.)    MD Stephanie Montanez  Attending Emergency Medicine Physician        Faith Lewis MD  10/01/20 Jaime Torres MD  10/01/20 [id number]

## 2020-10-01 NOTE — PROGRESS NOTES
[facility] Wound Ostomy Continence Nurse  Consult Note       NAME:  Elin Pinzon  MEDICAL RECORD NUMBER:  [id number]  AGE: 54 y.o. GENDER: male  : 1970  TODAY'S DATE:  10/1/2020    Subjective:     Reason for WOCN Evaluation and Assessment: \"cellulitis\"      Elin Pinzon is a 54 y.o. male referred by:   [x] Physician  [] Nursing  [] Other:     Wound Identification:  Wound Type: venous and pressure  Contributing Factors: edema, venous stasis, lymphedema, chronic pressure, decreased mobility, shear force, obesity, decreased tissue oxygenation, anticoagulation therapy, poor hygiene and non-adherence     Chronic lymphedema BLE. Right lower leg venous ulcer. Right posterior thigh pressure injury with possible hematoma. Seeping drainage. Signs of chronic friction and pressure with hyperkeratotic ridging present. Central area of soft, deep purple tissue- deep tissue injury vs hematoma. Scattered ecchymotic areas noted along the right lateral thigh. Medial right thigh weeping, macerated area. Included in the right posterior thigh dressing. Objective:      /78   Pulse 76   Temp 98.6 °F (37 °C) (Oral)   Resp 22   Ht 6' 1\" (1.854 m)   Wt (!) 450 lb (204.1 kg)   SpO2 98%   BMI 59.37 kg/m²   Speedy Risk Score:      LABS    CBC:   Lab Results   Component Value Date    WBC 11.6 10/01/2020    RBC 3.68 10/01/2020    HGB 9.4 10/01/2020     CMP:  Albumin:  No results found for: LABALBU  PT/INR:    Lab Results   Component Value Date    PROTIME 36.7 2020    INR 3.7 2020     HgBA1c:    Lab Results   Component Value Date    LABA1C 6.1 10/29/2017     PTT: No components found for: LABPTT      Assessment:       Measurements:  Wound 10/01/20 Leg Right; Lower; Lateral (Active)   Wound Image   10/01/20 161   Wound Venous 10/01/20 161   Dressing Status Changed 10/01/20 161   Dressing Changed Changed/New 10/01/20 161   Dressing/Treatment Alginate with Ag;ABD;Roll gauze; Ace wrap 10/01/20 1619 roll gauze and ACE wrap from the base of the toe to the knee of the left leg for edema control.         Specialty Bed Required : Yes   [x] Low Air Loss   [x] Pressure Redistribution  [] Fluid Immersion  [x] Bariatric  [] Total Pressure Relief  [] Other:     Discharge Plan:  Placement for patient upon discharge: skilled nursing   Hospice Care: No   Patient appropriate for Outpatient [address]: Yes: [facility] as scheduled for wound care center    Patient/Caregiver Teaching:    [] Indicates understanding       [x] Needs reinforcement  [] Unsuccessful      [] Verbal Understanding  [] Demonstrated understanding       [] No evidence of learning  [] Refused teaching         [] N/A       Electronically signed by Phoenix Glass RN, CWON on 10/1/2020 at 4:33 PM

## 2020-10-01 NOTE — ED PROVIDER NOTES
[facility]     Emergency Department     Faculty Attestation    I performed a history and physical examination of the patient and discussed management with the resident. I have reviewed and agree with the residents findings including all diagnostic interpretations, and treatment plans as written at the time of my review. Any areas of disagreement are noted on the chart. I was personally present for the key portions of any procedures. I have documented in the chart those procedures where I was not present during the key portions. For Physician Assistant/ Nurse Practitioner cases/documentation I have personally evaluated this patient and have completed at least one if not all key elements of the E/M (history, physical exam, and MDM). Additional findings are as noted. This patient was evaluated in the Emergency Department for symptoms described in the history of present illness. The patient was evaluated in the context of the global COVID-19 pandemic, which necessitated consideration that the patient might be at risk for infection with the SARS-CoV-2 virus that causes COVID-19. Institutional protocols and algorithms that pertain to the evaluation of patients at risk for COVID-19 are in a state of rapid change based on information released by regulatory bodies including the CDC and federal and state organizations. These policies and algorithms were followed during the patient's care in the ED. Primary Care Physician: No primary care provider on file. History: This is a 54 y.o. male who presents to the Emergency Department with complaint of leg pain. The patient presents emerged from complaining of leg pain and discharge. This been ongoing for several years but worse recently. He denies any fever. Patient states he is no longer able to take care of himself and family members unable to care for him as well.     Physical:   height is 6' 1\" (1.854 m) and

## 2020-10-01 NOTE — ED PROVIDER NOTES
[facility] ED  Emergency Department  Emergency Medicine Resident Sign-out     Care of Estela Valdez was assumed from Dr. Jose San and is being seen for Cellulitis (BLE x3 years, reports increase in bleeding)  . The patient's initial evaluation and plan have been discussed with the prior provider who initially evaluated the patient. EMERGENCY DEPARTMENT COURSE / MEDICAL DECISION MAKING:       MEDICATIONS GIVEN:  Orders Placed This Encounter   Medications    0.9 % sodium chloride bolus    oxyCODONE (ROXICODONE) immediate release tablet 5 mg    oxyCODONE (ROXICODONE) immediate release tablet 5 mg    doxycycline hyclate (VIBRA-TABS) tablet 100 mg     Order Specific Question:   Antimicrobial Indications     Answer:   Skin and Soft Tissue Infection    oxyCODONE (ROXICODONE) 5 MG immediate release tablet     Sig: Take 1 tablet by mouth every 6 hours as needed for Pain for up to 3 days. Intended supply: 3 days.  Take lowest dose possible to manage pain     Dispense:  12 tablet     Refill:  0       LABS / RADIOLOGY:     Labs Reviewed   CBC WITH AUTO DIFFERENTIAL - Abnormal; Notable for the following components:       Result Value    WBC 11.6 (*)     RBC 3.68 (*)     Hemoglobin 9.4 (*)     Hematocrit 31.8 (*)     RDW 17.0 (*)     Seg Neutrophils 73 (*)     Lymphocytes 13 (*)     Immature Granulocytes 1 (*)     Segs Absolute 8.44 (*)     Absolute Eos # 0.50 (*)     All other components within normal limits   BASIC METABOLIC PANEL W/ REFLEX TO MG FOR LOW K - Abnormal; Notable for the following components:    Glucose 124 (*)     BUN 36 (*)     CREATININE 1.95 (*)     GFR Non- 37 (*)     GFR  45 (*)     All other components within normal limits   C-REACTIVE PROTEIN - Abnormal; Notable for the following components:    .0 (*)     All other components within normal limits   SEDIMENTATION RATE - Abnormal; Notable for the following components:    Sed Rate 130 (*) All other components within normal limits   LACTIC ACID, PLASMA       Xr Tibia Fibula Right (2 Views)    Result Date: 10/1/2020  EXAMINATION: TWO XRAY VIEWS OF THE RIGHT TIBIA AND FIBULA; THREE XRAY VIEWS OF THE RIGHT FOOT 10/1/2020 8:16 am COMPARISON: None HISTORY: ORDERING SYSTEM PROVIDED HISTORY: cellulitis TECHNOLOGIST PROVIDED HISTORY: cellulitis Reason for Exam: cellulitis FINDINGS: Right tibia/fibula diffuse soft tissue swelling of the right lower extremity. No acute fracture or dislocation. Subtle cortical thickening along the distal fibula which may relate to prior stress injury. This is otherwise nonspecific. No acute fracture or dislocation. Joint spaces and alignment otherwise maintained. Right foot: Soft tissue swelling. There is deformity of the 5th metatarsal which may be related to prior infection/inflammation or surgery. This has a more chronic appearance although no prior exams are available for comparison. There is slightly increased lucency of the proximal phalanx of 5th digit without definite erosive changes. Joint spaces and alignment otherwise maintained. 1. Diffuse soft tissue swelling of the right tibia/fibula and right foot. 2. Deformity of the 5th metatarsal which may be related to prior infection or prior surgery. This has a more chronic rather than acute appearance. However no prior exams are available for comparison. There is slightly increased lucency of the proximal phalanx of the 5th digit without definite erosive changes and early osteomyelitis is not entirely excluded. 3. Otherwise no acute osseous abnormality in the right tibia/fibula or foot.      Xr Foot Right (min 3 Views)    Result Date: 10/1/2020  EXAMINATION: TWO XRAY VIEWS OF THE RIGHT TIBIA AND FIBULA; THREE XRAY VIEWS OF THE RIGHT FOOT 10/1/2020 8:16 am COMPARISON: None HISTORY: ORDERING SYSTEM PROVIDED HISTORY: cellulitis TECHNOLOGIST PROVIDED HISTORY: cellulitis Reason for Exam: cellulitis FINDINGS: Right Eloped   FOLLOW-UP: [facility] ED  [address] [address]  [phone]  Go to   As needed, If symptoms worsen    [facility] wound care center  [phone]  Schedule an appointment as soon as possible for a visit       DISCHARGE MEDICATIONS: New Prescriptions    OXYCODONE (ROXICODONE) 5 MG IMMEDIATE RELEASE TABLET    Take 1 tablet by mouth every 6 hours as needed for Pain for up to 3 days. Intended supply: 3 days.  Take lowest dose possible to manage pain           Yefri Alarcon MD  Emergency Medicine Resident  [address] Yefri Alarcon MD  Resident  10/01/20 2036

## 2020-10-02 NOTE — ED NOTES
Pt soiled bed; bedsheet changed & new blankets given; call light within reach; urinal within reach; will cont to monitor     Nya Blanca RN  10/01/20 2059

## 2020-10-05 ENCOUNTER — HOSPITAL ENCOUNTER (OUTPATIENT)
Age: 50
Setting detail: SPECIMEN
Discharge: HOME | End: 2020-10-05
Payer: COMMERCIAL

## 2020-10-05 LAB
ANION GAP SERPL CALCULATED.3IONS-SCNC: 10 MMOL/L (ref 9–17)
BUN BLDV-MCNC: 25 MG/DL (ref 6–20)
BUN/CREAT BLD: ABNORMAL (ref 9–20)
CALCIUM SERPL-MCNC: 9 MG/DL (ref 8.6–10.4)
CHLORIDE BLD-SCNC: 101 MMOL/L (ref 98–107)
CO2: 28 MMOL/L (ref 20–31)
CREAT SERPL-MCNC: 1.56 MG/DL (ref 0.7–1.2)
GFR AFRICAN AMERICAN: 58 ML/MIN
GFR NON-AFRICAN AMERICAN: 48 ML/MIN
GFR SERPL CREATININE-BSD FRML MDRD: ABNORMAL ML/MIN/{1.73_M2}
GFR SERPL CREATININE-BSD FRML MDRD: ABNORMAL ML/MIN/{1.73_M2}
GLUCOSE BLD-MCNC: 97 MG/DL (ref 70–99)
HCT VFR BLD CALC: 34.9 % (ref 40.7–50.3)
HEMOGLOBIN: 9.6 G/DL (ref 13–17)
MCH RBC QN AUTO: 24.6 PG (ref 25.2–33.5)
MCHC RBC AUTO-ENTMCNC: 27.5 G/DL (ref 28.4–34.8)
MCV RBC AUTO: 89.5 FL (ref 82.6–102.9)
NRBC AUTOMATED: 0 PER 100 WBC
PDW BLD-RTO: 17.2 % (ref 11.8–14.4)
PLATELET # BLD: 310 K/UL (ref 138–453)
PMV BLD AUTO: 11.3 FL (ref 8.1–13.5)
POTASSIUM SERPL-SCNC: 4.4 MMOL/L (ref 3.7–5.3)
RBC # BLD: 3.9 M/UL (ref 4.21–5.77)
SODIUM BLD-SCNC: 139 MMOL/L (ref 135–144)
WBC # BLD: 8.9 K/UL (ref 3.5–11.3)

## 2020-10-05 PROCEDURE — 85027 COMPLETE CBC AUTOMATED: CPT

## 2020-10-05 PROCEDURE — 36415 COLL VENOUS BLD VENIPUNCTURE: CPT

## 2020-10-05 PROCEDURE — P9603 ONE-WAY ALLOW PRORATED MILES: HCPCS

## 2020-10-05 PROCEDURE — 80048 BASIC METABOLIC PNL TOTAL CA: CPT

## 2021-06-03 ENCOUNTER — HOSPITAL ENCOUNTER (OUTPATIENT)
Age: 51
Setting detail: SPECIMEN
Discharge: HOME | End: 2021-06-03
Payer: COMMERCIAL

## 2021-06-03 LAB
ALBUMIN SERPL-MCNC: 4.1 G/DL (ref 3.5–5.2)
ALBUMIN/GLOBULIN RATIO: 1 (ref 1–2.5)
ALP BLD-CCNC: 88 U/L (ref 40–129)
ALT SERPL-CCNC: 14 U/L (ref 5–41)
ANION GAP SERPL CALCULATED.3IONS-SCNC: 14 MMOL/L (ref 9–17)
AST SERPL-CCNC: 14 U/L
BILIRUB SERPL-MCNC: 0.43 MG/DL (ref 0.3–1.2)
BILIRUBIN DIRECT: 0.08 MG/DL
BILIRUBIN, INDIRECT: 0.35 MG/DL (ref 0–1)
BUN BLDV-MCNC: 19 MG/DL (ref 6–20)
BUN/CREAT BLD: ABNORMAL (ref 9–20)
CALCIUM SERPL-MCNC: 9.2 MG/DL (ref 8.6–10.4)
CHLORIDE BLD-SCNC: 100 MMOL/L (ref 98–107)
CO2: 27 MMOL/L (ref 20–31)
CREAT SERPL-MCNC: 1.12 MG/DL (ref 0.7–1.2)
GFR AFRICAN AMERICAN: >60 ML/MIN
GFR NON-AFRICAN AMERICAN: >60 ML/MIN
GFR SERPL CREATININE-BSD FRML MDRD: ABNORMAL ML/MIN/{1.73_M2}
GFR SERPL CREATININE-BSD FRML MDRD: ABNORMAL ML/MIN/{1.73_M2}
GLOBULIN: ABNORMAL G/DL (ref 1.5–3.8)
GLUCOSE BLD-MCNC: 116 MG/DL (ref 70–99)
HCT VFR BLD CALC: 39.5 % (ref 40.7–50.3)
HEMOGLOBIN: 11.8 G/DL (ref 13–17)
MCH RBC QN AUTO: 24.9 PG (ref 25.2–33.5)
MCHC RBC AUTO-ENTMCNC: 29.9 G/DL (ref 28.4–34.8)
MCV RBC AUTO: 83.3 FL (ref 82.6–102.9)
NRBC AUTOMATED: 0 PER 100 WBC
PDW BLD-RTO: 16.1 % (ref 11.8–14.4)
PLATELET # BLD: 141 K/UL (ref 138–453)
PMV BLD AUTO: 11.6 FL (ref 8.1–13.5)
POTASSIUM SERPL-SCNC: 4.1 MMOL/L (ref 3.7–5.3)
RBC # BLD: 4.74 M/UL (ref 4.21–5.77)
SODIUM BLD-SCNC: 141 MMOL/L (ref 135–144)
TOTAL PROTEIN: 8.4 G/DL (ref 6.4–8.3)
WBC # BLD: 9.4 K/UL (ref 3.5–11.3)

## 2021-06-03 PROCEDURE — P9603 ONE-WAY ALLOW PRORATED MILES: HCPCS

## 2021-06-03 PROCEDURE — 80048 BASIC METABOLIC PNL TOTAL CA: CPT

## 2021-06-03 PROCEDURE — 85027 COMPLETE CBC AUTOMATED: CPT

## 2021-06-03 PROCEDURE — 36415 COLL VENOUS BLD VENIPUNCTURE: CPT

## 2021-06-03 PROCEDURE — 80076 HEPATIC FUNCTION PANEL: CPT

## 2023-04-26 ENCOUNTER — HOSPITAL ENCOUNTER (OUTPATIENT)
Age: 53
Setting detail: SPECIMEN
Discharge: HOME | End: 2023-04-26

## 2023-04-26 LAB
ANION GAP SERPL CALCULATED.3IONS-SCNC: 14 MMOL/L (ref 9–17)
BUN SERPL-MCNC: 35 MG/DL (ref 6–20)
CALCIUM SERPL-MCNC: 9.2 MG/DL (ref 8.6–10.4)
CHLORIDE SERPL-SCNC: 100 MMOL/L (ref 98–107)
CO2 SERPL-SCNC: 27 MMOL/L (ref 20–31)
CREAT SERPL-MCNC: 1.87 MG/DL (ref 0.7–1.2)
GFR SERPL CREATININE-BSD FRML MDRD: 43 ML/MIN/1.73M2
GLUCOSE SERPL-MCNC: 102 MG/DL (ref 70–99)
HCT VFR BLD AUTO: 42.9 % (ref 40.7–50.3)
HGB BLD-MCNC: 13.3 G/DL (ref 13–17)
MCH RBC QN AUTO: 27.3 PG (ref 25.2–33.5)
MCHC RBC AUTO-ENTMCNC: 31 G/DL (ref 28.4–34.8)
MCV RBC AUTO: 88.1 FL (ref 82.6–102.9)
NRBC AUTOMATED: 0 PER 100 WBC
PDW BLD-RTO: 14.6 % (ref 11.8–14.4)
PLATELET # BLD AUTO: 247 K/UL (ref 138–453)
PMV BLD AUTO: 9.8 FL (ref 8.1–13.5)
POTASSIUM SERPL-SCNC: 3.5 MMOL/L (ref 3.7–5.3)
RBC # BLD: 4.87 M/UL (ref 4.21–5.77)
SODIUM SERPL-SCNC: 141 MMOL/L (ref 135–144)
WBC # BLD AUTO: 8.1 K/UL (ref 3.5–11.3)

## 2023-04-26 PROCEDURE — 85027 COMPLETE CBC AUTOMATED: CPT

## 2023-04-26 PROCEDURE — 80048 BASIC METABOLIC PNL TOTAL CA: CPT

## 2023-04-26 PROCEDURE — 36415 COLL VENOUS BLD VENIPUNCTURE: CPT

## 2023-04-26 PROCEDURE — P9603 ONE-WAY ALLOW PRORATED MILES: HCPCS

## 2023-04-28 ENCOUNTER — HOSPITAL ENCOUNTER (OUTPATIENT)
Age: 53
Setting detail: SPECIMEN
Discharge: HOME | End: 2023-04-28

## 2023-04-28 LAB
ANION GAP SERPL CALCULATED.3IONS-SCNC: 6 MMOL/L (ref 9–17)
BUN SERPL-MCNC: 32 MG/DL (ref 6–20)
CALCIUM SERPL-MCNC: 8.7 MG/DL (ref 8.6–10.4)
CHLORIDE SERPL-SCNC: 102 MMOL/L (ref 98–107)
CO2 SERPL-SCNC: 30 MMOL/L (ref 20–31)
CREAT SERPL-MCNC: 1.68 MG/DL (ref 0.7–1.2)
GFR SERPL CREATININE-BSD FRML MDRD: 49 ML/MIN/1.73M2
GLUCOSE SERPL-MCNC: 120 MG/DL (ref 70–99)
POTASSIUM SERPL-SCNC: 3.9 MMOL/L (ref 3.7–5.3)
SODIUM SERPL-SCNC: 138 MMOL/L (ref 135–144)

## 2023-04-28 PROCEDURE — 80048 BASIC METABOLIC PNL TOTAL CA: CPT

## 2023-04-28 PROCEDURE — 36415 COLL VENOUS BLD VENIPUNCTURE: CPT

## 2024-03-21 ENCOUNTER — HOSPITAL ENCOUNTER (OUTPATIENT)
Age: 54
Setting detail: SPECIMEN
Discharge: HOME | End: 2024-03-21

## 2024-03-21 LAB
ALBUMIN SERPL-MCNC: 3.5 G/DL (ref 3.5–5.2)
ALBUMIN/GLOB SERPL: 1 {RATIO} (ref 1–2.5)
ALP SERPL-CCNC: 78 U/L (ref 40–129)
ALT SERPL-CCNC: 26 U/L (ref 10–50)
ANION GAP SERPL CALCULATED.3IONS-SCNC: 11 MMOL/L (ref 9–16)
AST SERPL-CCNC: 23 U/L (ref 10–50)
BASOPHILS # BLD: 0.07 K/UL (ref 0–0.2)
BASOPHILS NFR BLD: 1 % (ref 0–2)
BILIRUB SERPL-MCNC: 0.3 MG/DL (ref 0–1.2)
BNP SERPL-MCNC: 493 PG/ML (ref 0–300)
BUN SERPL-MCNC: 39 MG/DL (ref 6–20)
CALCIUM SERPL-MCNC: 8.6 MG/DL (ref 8.6–10.4)
CHLORIDE SERPL-SCNC: 106 MMOL/L (ref 98–107)
CO2 SERPL-SCNC: 25 MMOL/L (ref 20–31)
CREAT SERPL-MCNC: 2.7 MG/DL (ref 0.7–1.2)
EOSINOPHIL # BLD: 0.49 K/UL (ref 0–0.44)
EOSINOPHILS RELATIVE PERCENT: 7 % (ref 1–4)
ERYTHROCYTE [DISTWIDTH] IN BLOOD BY AUTOMATED COUNT: 14.6 % (ref 11.8–14.4)
GFR SERPL CREATININE-BSD FRML MDRD: 28 ML/MIN/1.73M2
GLUCOSE SERPL-MCNC: 131 MG/DL (ref 74–99)
HCT VFR BLD AUTO: 43 % (ref 40.7–50.3)
HGB BLD-MCNC: 13.4 G/DL (ref 13–17)
IMM GRANULOCYTES # BLD AUTO: 0.07 K/UL (ref 0–0.3)
IMM GRANULOCYTES NFR BLD: 1 %
LYMPHOCYTES NFR BLD: 0.7 K/UL (ref 1.1–3.7)
LYMPHOCYTES RELATIVE PERCENT: 10 % (ref 24–43)
MCH RBC QN AUTO: 30.2 PG (ref 25.2–33.5)
MCHC RBC AUTO-ENTMCNC: 31.2 G/DL (ref 28.4–34.8)
MCV RBC AUTO: 96.8 FL (ref 82.6–102.9)
MONOCYTES NFR BLD: 0.49 K/UL (ref 0.1–1.2)
MONOCYTES NFR BLD: 7 % (ref 3–12)
MORPHOLOGY: ABNORMAL
NEUTROPHILS NFR BLD: 74 % (ref 36–65)
NEUTS SEG NFR BLD: 5.18 K/UL (ref 1.5–8.1)
NRBC BLD-RTO: 0 PER 100 WBC
PLATELET # BLD AUTO: 161 K/UL (ref 138–453)
PMV BLD AUTO: 10.5 FL (ref 8.1–13.5)
POTASSIUM SERPL-SCNC: 4.4 MMOL/L (ref 3.7–5.3)
PROT SERPL-MCNC: 7 G/DL (ref 6.6–8.7)
RBC # BLD AUTO: 4.44 M/UL (ref 4.21–5.77)
RBC # BLD: ABNORMAL 10*6/UL
SODIUM SERPL-SCNC: 142 MMOL/L (ref 136–145)
WBC OTHER # BLD: 7 K/UL (ref 3.5–11.3)

## 2024-03-21 PROCEDURE — 80053 COMPREHEN METABOLIC PANEL: CPT

## 2024-03-21 PROCEDURE — 36415 COLL VENOUS BLD VENIPUNCTURE: CPT

## 2024-03-21 PROCEDURE — 83880 ASSAY OF NATRIURETIC PEPTIDE: CPT

## 2024-03-21 PROCEDURE — P9603 ONE-WAY ALLOW PRORATED MILES: HCPCS

## 2024-03-21 PROCEDURE — 85025 COMPLETE CBC W/AUTO DIFF WBC: CPT

## 2024-03-28 ENCOUNTER — HOSPITAL ENCOUNTER (OUTPATIENT)
Age: 54
Setting detail: SPECIMEN
Discharge: HOME | End: 2024-03-28

## 2024-03-28 LAB
ANION GAP SERPL CALCULATED.3IONS-SCNC: 9 MMOL/L (ref 9–16)
BUN SERPL-MCNC: 30 MG/DL (ref 6–20)
CALCIUM SERPL-MCNC: 9 MG/DL (ref 8.6–10.4)
CHLORIDE SERPL-SCNC: 106 MMOL/L (ref 98–107)
CO2 SERPL-SCNC: 28 MMOL/L (ref 20–31)
CREAT SERPL-MCNC: 2.1 MG/DL (ref 0.7–1.2)
ERYTHROCYTE [DISTWIDTH] IN BLOOD BY AUTOMATED COUNT: 14.6 % (ref 11.8–14.4)
GFR SERPL CREATININE-BSD FRML MDRD: 37 ML/MIN/1.73M2
GLUCOSE SERPL-MCNC: 101 MG/DL (ref 74–99)
HCT VFR BLD AUTO: 43.4 % (ref 40.7–50.3)
HGB BLD-MCNC: 13.5 G/DL (ref 13–17)
MCH RBC QN AUTO: 29.2 PG (ref 25.2–33.5)
MCHC RBC AUTO-ENTMCNC: 31.1 G/DL (ref 28.4–34.8)
MCV RBC AUTO: 93.7 FL (ref 82.6–102.9)
NRBC BLD-RTO: 0 PER 100 WBC
PLATELET # BLD AUTO: 164 K/UL (ref 138–453)
PMV BLD AUTO: 9.5 FL (ref 8.1–13.5)
POTASSIUM SERPL-SCNC: 4.4 MMOL/L (ref 3.7–5.3)
RBC # BLD AUTO: 4.63 M/UL (ref 4.21–5.77)
SODIUM SERPL-SCNC: 143 MMOL/L (ref 136–145)
WBC OTHER # BLD: 6.9 K/UL (ref 3.5–11.3)

## 2024-03-28 PROCEDURE — P9603 ONE-WAY ALLOW PRORATED MILES: HCPCS

## 2024-03-28 PROCEDURE — 85027 COMPLETE CBC AUTOMATED: CPT

## 2024-03-28 PROCEDURE — 80048 BASIC METABOLIC PNL TOTAL CA: CPT

## 2024-03-28 PROCEDURE — 36415 COLL VENOUS BLD VENIPUNCTURE: CPT

## 2024-04-04 ENCOUNTER — HOSPITAL ENCOUNTER (OUTPATIENT)
Age: 54
Setting detail: SPECIMEN
Discharge: HOME | End: 2024-04-04

## 2024-05-15 ENCOUNTER — HOSPITAL ENCOUNTER (INPATIENT)
Age: 54
LOS: 10 days | Discharge: NURSING, REHAB OR LONG-TERM CARE | End: 2024-05-25
Attending: EMERGENCY MEDICINE
Payer: COMMERCIAL

## 2024-05-15 ENCOUNTER — APPOINTMENT (OUTPATIENT)
Dept: GENERAL RADIOLOGY | Age: 54
End: 2024-05-15
Payer: COMMERCIAL

## 2024-05-15 DIAGNOSIS — L03.115 CELLULITIS OF RIGHT LOWER EXTREMITY: ICD-10-CM
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
## 2024-05-15 LAB
ALBUMIN SERPL-MCNC: 3.6 G/DL (ref 3.5–5.2)
ALBUMIN/GLOB SERPL: 1 {RATIO} (ref 1–2.5)
ALP SERPL-CCNC: 52 U/L (ref 40–129)
ALT SERPL-CCNC: 7 U/L (ref 10–50)
ANION GAP SERPL CALCULATED.3IONS-SCNC: 11 MMOL/L (ref 9–16)
AST SERPL-CCNC: 22 U/L (ref 10–50)
BACTERIA URNS QL MICRO: ABNORMAL
BASOPHILS # BLD: 0 K/UL (ref 0–0.2)
BASOPHILS NFR BLD: 0 % (ref 0–2)
BILIRUB SERPL-MCNC: 0.5 MG/DL (ref 0–1.2)
BILIRUB UR QL STRIP: NEGATIVE
BUN SERPL-MCNC: 53 MG/DL (ref 6–20)
CALCIUM SERPL-MCNC: 8.7 MG/DL (ref 8.6–10.4)
CASTS #/AREA URNS LPF: ABNORMAL /LPF (ref 0–8)
CHLORIDE SERPL-SCNC: 105 MMOL/L (ref 98–107)
CLARITY UR: ABNORMAL
CO2 SERPL-SCNC: 22 MMOL/L (ref 20–31)
COLOR UR: YELLOW
CREAT SERPL-MCNC: 3.5 MG/DL (ref 0.7–1.2)
CRP SERPL HS-MCNC: 251 MG/L (ref 0–5)
EOSINOPHIL # BLD: 0.14 K/UL (ref 0–0.44)
EOSINOPHILS RELATIVE PERCENT: 1 % (ref 1–4)
EPI CELLS #/AREA URNS HPF: ABNORMAL /HPF (ref 0–5)
ERYTHROCYTE [DISTWIDTH] IN BLOOD BY AUTOMATED COUNT: 15.2 % (ref 11.8–14.4)
ERYTHROCYTE [SEDIMENTATION RATE] IN BLOOD BY PHOTOMETRIC METHOD: 55 MM/HR (ref 0–20)
GFR, ESTIMATED: 20 ML/MIN/1.73M2
GLUCOSE SERPL-MCNC: 130 MG/DL (ref 74–99)
GLUCOSE UR STRIP-MCNC: NEGATIVE MG/DL
HCT VFR BLD AUTO: 37.4 % (ref 40.7–50.3)
HGB BLD-MCNC: 11.7 G/DL (ref 13–17)
HGB UR QL STRIP.AUTO: NEGATIVE
IMM GRANULOCYTES # BLD AUTO: 0.14 K/UL (ref 0–0.3)
IMM GRANULOCYTES NFR BLD: 1 %
KETONES UR STRIP-MCNC: NEGATIVE MG/DL
LACTIC ACID, WHOLE BLOOD: 2.4 MMOL/L (ref 0.7–2.1)
LEUKOCYTE ESTERASE UR QL STRIP: NEGATIVE
LYMPHOCYTES NFR BLD: 0.68 K/UL (ref 1.1–3.7)
LYMPHOCYTES RELATIVE PERCENT: 5 % (ref 24–43)
MCH RBC QN AUTO: 30.4 PG (ref 25.2–33.5)
MCHC RBC AUTO-ENTMCNC: 31.3 G/DL (ref 28.4–34.8)
MCV RBC AUTO: 97.1 FL (ref 82.6–102.9)
MONOCYTES NFR BLD: 0.95 K/UL (ref 0.1–1.2)
MONOCYTES NFR BLD: 7 % (ref 3–12)
MORPHOLOGY: ABNORMAL
NEUTROPHILS NFR BLD: 86 % (ref 36–65)
NEUTS SEG NFR BLD: 11.69 K/UL (ref 1.5–8.1)
NITRITE UR QL STRIP: NEGATIVE
NRBC BLD-RTO: 0 PER 100 WBC
PH UR STRIP: 8 [PH] (ref 5–8)
PLATELET # BLD AUTO: 166 K/UL (ref 138–453)
PMV BLD AUTO: 10.3 FL (ref 8.1–13.5)
POTASSIUM SERPL-SCNC: 4.7 MMOL/L (ref 3.7–5.3)
PROT SERPL-MCNC: 7.1 G/DL (ref 6.6–8.7)
PROT UR STRIP-MCNC: ABNORMAL MG/DL
RBC # BLD AUTO: 3.85 M/UL (ref 4.21–5.77)
RBC #/AREA URNS HPF: ABNORMAL /HPF (ref 0–4)
SODIUM SERPL-SCNC: 138 MMOL/L (ref 136–145)
SP GR UR STRIP: 1.02 (ref 1–1.03)
UROBILINOGEN UR STRIP-ACNC: NORMAL EU/DL (ref 0–1)
WBC #/AREA URNS HPF: ABNORMAL /HPF (ref 0–5)
WBC OTHER # BLD: 13.6 K/UL (ref 3.5–11.3)

## 2024-05-15 PROCEDURE — 2580000003 HC RX 258: Performed by: STUDENT IN AN ORGANIZED HEALTH CARE EDUCATION/TRAINING PROGRAM

## 2024-05-15 PROCEDURE — 96374 THER/PROPH/DIAG INJ IV PUSH: CPT

## 2024-05-15 PROCEDURE — 6370000000 HC RX 637 (ALT 250 FOR IP): Performed by: STUDENT IN AN ORGANIZED HEALTH CARE EDUCATION/TRAINING PROGRAM

## 2024-05-15 PROCEDURE — 99222 1ST HOSP IP/OBS MODERATE 55: CPT | Performed by: STUDENT IN AN ORGANIZED HEALTH CARE EDUCATION/TRAINING PROGRAM

## 2024-05-15 PROCEDURE — 81001 URINALYSIS AUTO W/SCOPE: CPT

## 2024-05-15 PROCEDURE — 86140 C-REACTIVE PROTEIN: CPT

## 2024-05-15 PROCEDURE — 85652 RBC SED RATE AUTOMATED: CPT

## 2024-05-15 PROCEDURE — 6360000002 HC RX W HCPCS: Performed by: EMERGENCY MEDICINE

## 2024-05-15 PROCEDURE — 93005 ELECTROCARDIOGRAM TRACING: CPT | Performed by: STUDENT IN AN ORGANIZED HEALTH CARE EDUCATION/TRAINING PROGRAM

## 2024-05-15 PROCEDURE — 99285 EMERGENCY DEPT VISIT HI MDM: CPT

## 2024-05-15 PROCEDURE — 85025 COMPLETE CBC W/AUTO DIFF WBC: CPT

## 2024-05-15 PROCEDURE — 6360000002 HC RX W HCPCS: Performed by: STUDENT IN AN ORGANIZED HEALTH CARE EDUCATION/TRAINING PROGRAM

## 2024-05-15 PROCEDURE — 2060000000 HC ICU INTERMEDIATE R&B

## 2024-05-15 PROCEDURE — 73590 X-RAY EXAM OF LOWER LEG: CPT

## 2024-05-15 PROCEDURE — 80053 COMPREHEN METABOLIC PANEL: CPT

## 2024-05-15 PROCEDURE — 83605 ASSAY OF LACTIC ACID: CPT

## 2024-05-15 PROCEDURE — 87040 BLOOD CULTURE FOR BACTERIA: CPT

## 2024-05-15 RX ORDER — ONDANSETRON 4 MG/1
4 TABLET, ORALLY DISINTEGRATING ORAL EVERY 8 HOURS PRN
Status: DISCONTINUED | OUTPATIENT
Start: 2024-05-15 | End: 2024-05-25 | Stop reason: HOSPADM

## 2024-05-15 RX ORDER — ARIPIPRAZOLE 15 MG/1
15 TABLET ORAL DAILY
Status: DISCONTINUED | OUTPATIENT
Start: 2024-05-16 | End: 2024-05-25 | Stop reason: HOSPADM

## 2024-05-15 RX ORDER — ONDANSETRON 2 MG/ML
4 INJECTION INTRAMUSCULAR; INTRAVENOUS EVERY 6 HOURS PRN
Status: DISCONTINUED | OUTPATIENT
Start: 2024-05-15 | End: 2024-05-25 | Stop reason: HOSPADM

## 2024-05-15 RX ORDER — MAGNESIUM SULFATE IN WATER 40 MG/ML
2000 INJECTION, SOLUTION INTRAVENOUS PRN
Status: DISCONTINUED | OUTPATIENT
Start: 2024-05-15 | End: 2024-05-25 | Stop reason: HOSPADM

## 2024-05-15 RX ORDER — SODIUM CHLORIDE 0.9 % (FLUSH) 0.9 %
5-40 SYRINGE (ML) INJECTION PRN
Status: DISCONTINUED | OUTPATIENT
Start: 2024-05-15 | End: 2024-05-25 | Stop reason: HOSPADM

## 2024-05-15 RX ORDER — HYDRALAZINE HYDROCHLORIDE 50 MG/1
50 TABLET, FILM COATED ORAL 3 TIMES DAILY
Status: DISCONTINUED | OUTPATIENT
Start: 2024-05-15 | End: 2024-05-19

## 2024-05-15 RX ORDER — POTASSIUM CHLORIDE 7.45 MG/ML
10 INJECTION INTRAVENOUS PRN
Status: DISCONTINUED | OUTPATIENT
Start: 2024-05-15 | End: 2024-05-25 | Stop reason: HOSPADM

## 2024-05-15 RX ORDER — SODIUM CHLORIDE 9 MG/ML
INJECTION, SOLUTION INTRAVENOUS PRN
Status: DISCONTINUED | OUTPATIENT
Start: 2024-05-15 | End: 2024-05-25 | Stop reason: HOSPADM

## 2024-05-15 RX ORDER — POLYETHYLENE GLYCOL 3350 17 G/17G
17 POWDER, FOR SOLUTION ORAL DAILY PRN
Status: DISCONTINUED | OUTPATIENT
Start: 2024-05-15 | End: 2024-05-25 | Stop reason: HOSPADM

## 2024-05-15 RX ORDER — POTASSIUM CHLORIDE 20 MEQ/1
40 TABLET, EXTENDED RELEASE ORAL PRN
Status: DISCONTINUED | OUTPATIENT
Start: 2024-05-15 | End: 2024-05-25 | Stop reason: HOSPADM

## 2024-05-15 RX ORDER — ACETAMINOPHEN 650 MG/1
650 SUPPOSITORY RECTAL EVERY 6 HOURS PRN
Status: DISCONTINUED | OUTPATIENT
Start: 2024-05-15 | End: 2024-05-25 | Stop reason: HOSPADM

## 2024-05-15 RX ORDER — CARVEDILOL 12.5 MG/1
25 TABLET ORAL 2 TIMES DAILY WITH MEALS
Status: DISCONTINUED | OUTPATIENT
Start: 2024-05-15 | End: 2024-05-22

## 2024-05-15 RX ORDER — SODIUM CHLORIDE 0.9 % (FLUSH) 0.9 %
5-40 SYRINGE (ML) INJECTION EVERY 12 HOURS SCHEDULED
Status: DISCONTINUED | OUTPATIENT
Start: 2024-05-15 | End: 2024-05-25 | Stop reason: HOSPADM

## 2024-05-15 RX ORDER — SODIUM CHLORIDE 9 MG/ML
INJECTION, SOLUTION INTRAVENOUS CONTINUOUS
Status: ACTIVE | OUTPATIENT
Start: 2024-05-15 | End: 2024-05-17

## 2024-05-15 RX ORDER — ACETAMINOPHEN 325 MG/1
650 TABLET ORAL EVERY 6 HOURS PRN
Status: DISCONTINUED | OUTPATIENT
Start: 2024-05-15 | End: 2024-05-25 | Stop reason: HOSPADM

## 2024-05-15 RX ADMIN — Medication 2000 MG: at 20:20

## 2024-05-15 RX ADMIN — Medication 2000 MG: at 12:51

## 2024-05-15 RX ADMIN — CARVEDILOL 25 MG: 12.5 TABLET, FILM COATED ORAL at 18:30

## 2024-05-15 RX ADMIN — APIXABAN 2.5 MG: 5 TABLET, FILM COATED ORAL at 20:20

## 2024-05-15 RX ADMIN — HYDRALAZINE HYDROCHLORIDE 50 MG: 50 TABLET, FILM COATED ORAL at 20:20

## 2024-05-15 RX ADMIN — SODIUM CHLORIDE, PRESERVATIVE FREE 10 ML: 5 INJECTION INTRAVENOUS at 20:21

## 2024-05-15 RX ADMIN — SODIUM CHLORIDE: 9 INJECTION, SOLUTION INTRAVENOUS at 18:40

## 2024-05-15 ASSESSMENT — PAIN DESCRIPTION - ORIENTATION: ORIENTATION: RIGHT;LEFT

## 2024-05-15 ASSESSMENT — PAIN SCALES - GENERAL: PAINLEVEL_OUTOF10: 3

## 2024-05-15 ASSESSMENT — PAIN DESCRIPTION - LOCATION: LOCATION: FOOT

## 2024-05-15 ASSESSMENT — PAIN - FUNCTIONAL ASSESSMENT: PAIN_FUNCTIONAL_ASSESSMENT: NONE - DENIES PAIN

## 2024-05-15 ASSESSMENT — PAIN DESCRIPTION - DESCRIPTORS: DESCRIPTORS: DISCOMFORT

## 2024-05-15 NOTE — ED TRIAGE NOTES
Pt has large abscess to RLE, obstructing his ability to ambulate around his home.  He states that he tripped and fell, pt denies LOC, no trauma noted.

## 2024-05-15 NOTE — ED NOTES
ED to inpatient nurses report      Chief Complaint:  Chief Complaint   Patient presents with    Fatigue     Present to ED from: home    MOA:     LOC: alert and orientated to name, place, date  Mobility: Fully dependent  Oxygen Baseline: room air    Current needs required: room air   Pending ED orders: none  Present condition: alert and stable    Why did the patient come to the ED? Mechanical fall  What is the plan? Admit for wound care  Any procedures or intervention occur? none  Any safety concerns??    Mental Status:  Level of Consciousness: Alert (0)    Psych Assessment:   Psychosocial  Psychosocial (WDL): Within Defined Limits  Vital signs   Vitals:    05/15/24 1200 05/15/24 1300 05/15/24 1309 05/15/24 1330   BP:  129/67  134/73   Pulse:  68 66 77   Resp:       Temp:       TempSrc:       SpO2:       Weight:       Height: 1.854 m (6' 1\")           Vitals:  Patient Vitals for the past 24 hrs:   BP Temp Temp src Pulse Resp SpO2 Height Weight   05/15/24 1330 134/73 -- -- 77 -- -- -- --   05/15/24 1309 -- -- -- 66 -- -- -- --   05/15/24 1300 129/67 -- -- 68 -- -- -- --   05/15/24 1200 -- -- -- -- -- -- 1.854 m (6' 1\") --   05/15/24 1100 -- -- -- -- -- 96 % -- --   05/15/24 1033 -- -- -- 81 -- 96 % -- --   05/15/24 0919 128/77 98.6 °F (37 °C) Oral 77 18 97 % -- (!) 204.1 kg (450 lb)      Visit Vitals  /73   Pulse 77   Temp 98.6 °F (37 °C) (Oral)   Resp 18   Ht 1.854 m (6' 1\")   Wt (!) 204.1 kg (450 lb)   SpO2 96%   BMI 59.37 kg/m²        LDAs:   Peripheral IV 05/15/24 Right Antecubital (Active)       Peripheral IV 05/15/24 Left Antecubital (Active)       Ambulatory Status:  No data recorded    Diagnosis:  DISPOSITION Admitted 05/15/2024 01:22:17 PM   Final diagnoses:   None        Consults:  IP CONSULT TO HOSPITALIST     Treatment Team:   Treatment Team: Attending Provider: Doc Fatima MD; Registered Nurse: Elisa Franco RN; Consulting Physician: Roxy Mcclelland PA    Treatment:  ED Course as of 05/15/24  (COLACE) 100 MG CAPSULE    Take 1 capsule by mouth Daily    FERROUS SULFATE (FE TABS) 325 (65 FE) MG EC TABLET    Take 1 tablet by mouth 2 times daily    HYDRALAZINE (APRESOLINE) 100 MG TABLET    Take 1 tablet by mouth every 8 hours    LISINOPRIL (PRINIVIL;ZESTRIL) 20 MG TABLET    Take 20 mg by mouth daily    MICONAZOLE (MICOTIN) 2 % POWDER    Apply topically 2 times daily.    POTASSIUM CHLORIDE (KLOR-CON M) 20 MEQ EXTENDED RELEASE TABLET    Take 20 mEq by mouth 2 times daily     Orders Placed This Encounter   Medications    ceFAZolin (ANCEF) 2000 mg in sterile water 20 mL IV syringe     Order Specific Question:   Antimicrobial Indications     Answer:   Skin and Soft Tissue Infection       SURGICAL HISTORY     No past surgical history on file.    PAST MEDICAL HISTORY       Past Medical History:   Diagnosis Date    Bipolar 1 disorder (HCC)     Hypertension        Labs:  Labs Reviewed   CBC WITH AUTO DIFFERENTIAL - Abnormal; Notable for the following components:       Result Value    WBC 13.6 (*)     RBC 3.85 (*)     Hemoglobin 11.7 (*)     Hematocrit 37.4 (*)     RDW 15.2 (*)     Immature Granulocytes % 1 (*)     Neutrophils % 86 (*)     Lymphocytes % 5 (*)     Neutrophils Absolute 11.69 (*)     Lymphocytes Absolute 0.68 (*)     All other components within normal limits   COMPREHENSIVE METABOLIC PANEL - Abnormal; Notable for the following components:    Glucose 130 (*)     BUN 53 (*)     Creatinine 3.5 (*)     Est, Glom Filt Rate 20 (*)     ALT 7 (*)     All other components within normal limits   LACTIC ACID - Abnormal; Notable for the following components:    Lactic Acid, Whole Blood 2.4 (*)     All other components within normal limits   C-REACTIVE PROTEIN - Abnormal; Notable for the following components:    .0 (*)     All other components within normal limits   SEDIMENTATION RATE - Abnormal; Notable for the following components:    Sed Rate, Automated 55 (*)     All other components within normal limits

## 2024-05-15 NOTE — H&P
[facility]  Office: [phone]  Phillip Pierson DO, Brendon Barney DO, Efra Aly DO, Dinh Garcia DO, Ariel Snyder MD, Mili Rangel MD, Emily Juarez MD, Brandie Erazo MD,  Nikolas Obrien MD, Layton Mejia MD, Andrew Forbes MD,  Nicole Lai DO, Penny Arriaga MD, Doc Fatima MD, Massimo Pierson DO, Yamilet Harper MD,  Dameon Pineda DO, Brittany Zayas MD, Geneva Snowden MD, Keely Manzo MD, Ryan Kline MD,  Newton Mckeon MD, Yanna Vasquez MD, Neal Li MD, Liliana Turner MD, Luis Parkinson MD, Bishop Tariq MD, Amanuel Garcia DO, Antoine Conrad DO, Jesus Correa MD,  Pj Solomon MD, Shirley Waterhouse, CNP,  Katherin Bruno CNP, Robby Gallardo, CNP,  Adriana Foy, DNP, Shelley Buck, CNP, Giulia Sunshine, CNP, Merline Person CNP, Maura Viera, CNP, Whitney Albarran, PA-C, Roxy Mcclelland PA-C, Jeanne Hernandez, CNP, Madeline Carreon, CNP, Vega Young, CNP, Sophia Malone, CNP, Yane Roberts, CNP, Inge Mathias, CNS, Katherin Nazario, CNP, Bobbi Fernandes CNP, Tracy Schwab, CNP         [facility]   IN-PATIENT SERVICE   [facility]    HISTORY AND PHYSICAL EXAMINATION            Date:   5/15/2024  Patient name:  Stevo Avilez  Date of admission:  5/15/2024  9:14 AM  MRN:   [id number]  Account:  [id number]  YOB: 1970  PCP:    No primary care provider on file.  Room:   20/  Code Status:    Prior      History Obtained From:     patient    History of Present Illness:     Stevo Avilez is a 53 y.o. male with a past medical history of morbid obesity, paroxsymal atrial fibrillation, CKD Stage 3b, lymphedema and hypertension who presented to the emergency department on 5/15/2024 complaining of inability to ambulate and frequent falls. The patient states that his lymphedema has become so severe that he is no longer able to ambulate. He follows with bariatric surgery and has been told that he needs to lose at least 30-pounds  Requests R FA     Culture NO GROWTH <24 HRS    Culture, Blood 1    Collection Time: 05/15/24  9:39 AM    Specimen: Blood   Result Value Ref Range    Specimen Description .BLOOD     Special Requests L FA     Culture NO GROWTH <24 HRS    CBC with Auto Differential    Collection Time: 05/15/24  9:50 AM   Result Value Ref Range    WBC 13.6 (H) 3.5 - 11.3 k/uL    RBC 3.85 (L) 4.21 - 5.77 m/uL    Hemoglobin 11.7 (L) 13.0 - 17.0 g/dL    Hematocrit 37.4 (L) 40.7 - 50.3 %    MCV 97.1 82.6 - 102.9 fL    MCH 30.4 25.2 - 33.5 pg    MCHC 31.3 28.4 - 34.8 g/dL    RDW 15.2 (H) 11.8 - 14.4 %    Platelets 166 138 - 453 k/uL    MPV 10.3 8.1 - 13.5 fL    NRBC Automated 0.0 0.0 per 100 WBC    Immature Granulocytes % 1 (H) 0 %    Neutrophils % 86 (H) 36 - 65 %    Lymphocytes % 5 (L) 24 - 43 %    Monocytes % 7 3 - 12 %    Eosinophils % 1 1 - 4 %    Basophils % 0 0 - 2 %    Immature Granulocytes Absolute 0.14 0.00 - 0.30 k/uL    Neutrophils Absolute 11.69 (H) 1.50 - 8.10 k/uL    Lymphocytes Absolute 0.68 (L) 1.10 - 3.70 k/uL    Monocytes Absolute 0.95 0.10 - 1.20 k/uL    Eosinophils Absolute 0.14 0.00 - 0.44 k/uL    Basophils Absolute 0.00 0.00 - 0.20 k/uL    Morphology ANISOCYTOSIS PRESENT    Comprehensive Metabolic Panel    Collection Time: 05/15/24  9:50 AM   Result Value Ref Range    Sodium 138 136 - 145 mmol/L    Potassium 4.7 3.7 - 5.3 mmol/L    Chloride 105 98 - 107 mmol/L    CO2 22 20 - 31 mmol/L    Anion Gap 11 9 - 16 mmol/L    Glucose 130 (H) 74 - 99 mg/dL    BUN 53 (H) 6 - 20 mg/dL    Creatinine 3.5 (H) 0.70 - 1.20 mg/dL    Est, Glom Filt Rate 20 (L) >60 mL/min/1.73m2    Calcium 8.7 8.6 - 10.4 mg/dL    Total Protein 7.1 6.6 - 8.7 g/dL    Albumin 3.6 3.5 - 5.2 g/dL    Albumin/Globulin Ratio 1.0 1.0 - 2.5    Total Bilirubin 0.5 0.00 - 1.20 mg/dL    Alkaline Phosphatase 52 40 - 129 U/L    ALT 7 (L) 10 - 50 U/L    AST 22 10 - 50 U/L   Lactic Acid    Collection Time: 05/15/24  9:50 AM   Result Value Ref Range    Lactic Acid, Whole

## 2024-05-15 NOTE — ED PROVIDER NOTES
[facility]- [facility]  Emergency Department Encounter  Emergency Medicine      Pt Name:Stevo Avilez  MRN: [id number]  Birthdate 1970  Date of evaluation: 5/15/24  PCP:  Michael Godinez APRN - CNP  9:39 AM EDT      CHIEF COMPLAINT       Chief Complaint   Patient presents with    Fatigue       HISTORY OF PRESENT ILLNESS  (Location/Symptom, Timing/Onset, Context/Setting, Quality, Duration, Modifying Factors, Severity.)      Stevo Avilez is a 53 y.o. male who presents with multiple medical problems.  Morbidly obese, difficulty with ambulation.  Was getting up to get into the car to go to a podiatry appointment for nail maintenance.  When he was off balance and fell backwards.  He did not hit his head or lose consciousness.  He has lymphedema, and significant swelling to his right upper leg which is chronic and ongoing over the past 3 years he also has multiple wounds to his right leg and per EMS has an abscess.  Chart is reviewed that does show that patient has a history of atrial flutter and is anticoagulated.  He is also on torsemide, spironolactone and multiple cardiac medications.    PAST MEDICAL / SURGICAL / SOCIAL / FAMILY HISTORY      has a past medical history of Bipolar 1 disorder (HCC) and Hypertension.       has no past surgical history on file.      Social History     Socioeconomic History    Marital status:      Spouse name: Not on file    Number of children: Not on file    Years of education: Not on file    Highest education level: Not on file   Occupational History    Not on file   Tobacco Use    Smoking status: Never    Smokeless tobacco: Never   Substance and Sexual Activity    Alcohol use: Yes    Drug use: No    Sexual activity: Not on file   Other Topics Concern    Not on file   Social History Narrative    Not on file     Social Determinants of Health     Financial Resource Strain: Not on file   Food Insecurity: Not on file   Transportation Needs: Not on file   Physical Activity: Not on  file   Stress: Not on file   Social Connections: Not on file   Intimate Partner Violence: Not on file   Housing Stability: Not on file       No family history on file.    Allergies:  Patient has no known allergies.    Home Medications:  Prior to Admission medications    Medication Sig Start Date End Date Taking? Authorizing Provider   bumetanide (BUMEX) 2 MG tablet Take 2 mg by mouth 2 times daily    Katie Juarez MD   potassium chloride (KLOR-CON M) 20 MEQ extended release tablet Take 20 mEq by mouth 2 times daily    Katie Juarez MD   lisinopril (PRINIVIL;ZESTRIL) 20 MG tablet Take 20 mg by mouth daily    Katie Juarez MD   allopurinol (ZYLOPRIM) 300 MG tablet Take 300 mg by mouth daily    Katie Juarez MD   busPIRone (BUSPAR) 15 MG tablet Take 15 mg by mouth 2 times daily    Katie Juarez MD   apixaban (ELIQUIS) 5 MG TABS tablet Take by mouth 2 times daily    Katie Juarez MD   hydrALAZINE (APRESOLINE) 100 MG tablet Take 1 tablet by mouth every 8 hours  Patient taking differently: Take 50 mg by mouth 3 times daily  11/3/17   Madyson Hester MD   ARIPiprazole (ABILIFY) 10 MG tablet Take 1 tablet by mouth daily  Patient taking differently: Take 15 mg by mouth daily  11/4/17   Madyson Hester MD   carvedilol (COREG) 25 MG tablet Take 1 tablet by mouth 2 times daily (with meals) 11/3/17   Madyson Hester MD   amLODIPine (NORVASC) 10 MG tablet Take 1 tablet by mouth 2 times daily 11/3/17   Madyson Hester MD   miconazole (MICOTIN) 2 % powder Apply topically 2 times daily. 11/3/17   Madyson Hester MD   docusate sodium (COLACE) 100 MG capsule Take 1 capsule by mouth Daily 11/3/17   Madyson Hester MD   ferrous sulfate (FE TABS) 325 (65 Fe) MG EC tablet Take 1 tablet by mouth 2 times daily  Patient taking differently: Take 325 mg by mouth daily  11/3/17   Madyson Hester MD         REVIEW OF SYSTEMS       See hpi    PHYSICAL EXAM      INITIAL VITALS:   BP (!)

## 2024-05-16 ENCOUNTER — APPOINTMENT (OUTPATIENT)
Dept: CT IMAGING | Age: 54
End: 2024-05-16
Payer: COMMERCIAL

## 2024-05-16 ENCOUNTER — APPOINTMENT (OUTPATIENT)
Dept: GENERAL RADIOLOGY | Age: 54
End: 2024-05-16
Payer: COMMERCIAL

## 2024-05-16 PROBLEM — R79.82 CRP ELEVATED: Status: ACTIVE | Noted: 2024-05-16

## 2024-05-16 LAB
ANION GAP SERPL CALCULATED.3IONS-SCNC: 10 MMOL/L (ref 9–16)
BACTERIA URNS QL MICRO: NORMAL
BASOPHILS # BLD: 0 K/UL (ref 0–0.2)
BASOPHILS NFR BLD: 0 % (ref 0–2)
BILIRUB UR QL STRIP: NEGATIVE
BUN SERPL-MCNC: 53 MG/DL (ref 6–20)
CALCIUM SERPL-MCNC: 8.4 MG/DL (ref 8.6–10.4)
CASTS #/AREA URNS LPF: NORMAL /LPF (ref 0–8)
CHLORIDE SERPL-SCNC: 108 MMOL/L (ref 98–107)
CLARITY UR: CLEAR
CO2 SERPL-SCNC: 21 MMOL/L (ref 20–31)
COLOR UR: YELLOW
CREAT SERPL-MCNC: 3.4 MG/DL (ref 0.7–1.2)
CRP SERPL HS-MCNC: 272 MG/L (ref 0–5)
EKG ATRIAL RATE: 74 BPM
EKG P AXIS: 63 DEGREES
EKG P-R INTERVAL: 218 MS
EKG Q-T INTERVAL: 396 MS
EKG QRS DURATION: 112 MS
EKG QTC CALCULATION (BAZETT): 439 MS
EKG R AXIS: -39 DEGREES
EKG T AXIS: 67 DEGREES
EKG VENTRICULAR RATE: 74 BPM
EOSINOPHIL # BLD: 0 K/UL (ref 0–0.44)
EOSINOPHILS RELATIVE PERCENT: 0 % (ref 1–4)
EPI CELLS #/AREA URNS HPF: NORMAL /HPF (ref 0–5)
ERYTHROCYTE [DISTWIDTH] IN BLOOD BY AUTOMATED COUNT: 15 % (ref 11.8–14.4)
GFR, ESTIMATED: 21 ML/MIN/1.73M2
GLUCOSE SERPL-MCNC: 134 MG/DL (ref 74–99)
GLUCOSE UR STRIP-MCNC: NEGATIVE MG/DL
HCT VFR BLD AUTO: 34.5 % (ref 40.7–50.3)
HGB BLD-MCNC: 10.6 G/DL (ref 13–17)
HGB UR QL STRIP.AUTO: NEGATIVE
IMM GRANULOCYTES # BLD AUTO: 0 K/UL (ref 0–0.3)
IMM GRANULOCYTES NFR BLD: 0 %
KETONES UR STRIP-MCNC: NEGATIVE MG/DL
LEUKOCYTE ESTERASE UR QL STRIP: NEGATIVE
LYMPHOCYTES NFR BLD: 0.55 K/UL (ref 1.1–3.7)
LYMPHOCYTES RELATIVE PERCENT: 5 % (ref 24–43)
MCH RBC QN AUTO: 30.5 PG (ref 25.2–33.5)
MCHC RBC AUTO-ENTMCNC: 30.7 G/DL (ref 28.4–34.8)
MCV RBC AUTO: 99.1 FL (ref 82.6–102.9)
MONOCYTES NFR BLD: 0.66 K/UL (ref 0.1–1.2)
MONOCYTES NFR BLD: 6 % (ref 3–12)
MORPHOLOGY: ABNORMAL
NEUTROPHILS NFR BLD: 89 % (ref 36–65)
NEUTS SEG NFR BLD: 9.79 K/UL (ref 1.5–8.1)
NITRITE UR QL STRIP: NEGATIVE
NRBC BLD-RTO: 0 PER 100 WBC
PH UR STRIP: 5 [PH] (ref 5–8)
PLATELET # BLD AUTO: 158 K/UL (ref 138–453)
PMV BLD AUTO: 9.6 FL (ref 8.1–13.5)
POTASSIUM SERPL-SCNC: 4.5 MMOL/L (ref 3.7–5.3)
PROT UR STRIP-MCNC: ABNORMAL MG/DL
RBC # BLD AUTO: 3.48 M/UL (ref 4.21–5.77)
RBC #/AREA URNS HPF: NORMAL /HPF (ref 0–4)
SODIUM SERPL-SCNC: 139 MMOL/L (ref 136–145)
SP GR UR STRIP: 1.02 (ref 1–1.03)
UROBILINOGEN UR STRIP-ACNC: NORMAL EU/DL (ref 0–1)
WBC #/AREA URNS HPF: NORMAL /HPF (ref 0–5)
WBC OTHER # BLD: 11 K/UL (ref 3.5–11.3)

## 2024-05-16 PROCEDURE — 97530 THERAPEUTIC ACTIVITIES: CPT

## 2024-05-16 PROCEDURE — 72100 X-RAY EXAM L-S SPINE 2/3 VWS: CPT

## 2024-05-16 PROCEDURE — 51702 INSERT TEMP BLADDER CATH: CPT

## 2024-05-16 PROCEDURE — 6370000000 HC RX 637 (ALT 250 FOR IP): Performed by: STUDENT IN AN ORGANIZED HEALTH CARE EDUCATION/TRAINING PROGRAM

## 2024-05-16 PROCEDURE — 81001 URINALYSIS AUTO W/SCOPE: CPT

## 2024-05-16 PROCEDURE — 2580000003 HC RX 258: Performed by: STUDENT IN AN ORGANIZED HEALTH CARE EDUCATION/TRAINING PROGRAM

## 2024-05-16 PROCEDURE — 6360000002 HC RX W HCPCS: Performed by: STUDENT IN AN ORGANIZED HEALTH CARE EDUCATION/TRAINING PROGRAM

## 2024-05-16 PROCEDURE — 99222 1ST HOSP IP/OBS MODERATE 55: CPT | Performed by: INTERNAL MEDICINE

## 2024-05-16 PROCEDURE — 36415 COLL VENOUS BLD VENIPUNCTURE: CPT

## 2024-05-16 PROCEDURE — 51798 US URINE CAPACITY MEASURE: CPT

## 2024-05-16 PROCEDURE — 97163 PT EVAL HIGH COMPLEX 45 MIN: CPT

## 2024-05-16 PROCEDURE — 86140 C-REACTIVE PROTEIN: CPT

## 2024-05-16 PROCEDURE — 99232 SBSQ HOSP IP/OBS MODERATE 35: CPT | Performed by: STUDENT IN AN ORGANIZED HEALTH CARE EDUCATION/TRAINING PROGRAM

## 2024-05-16 PROCEDURE — 85025 COMPLETE CBC W/AUTO DIFF WBC: CPT

## 2024-05-16 PROCEDURE — 2060000000 HC ICU INTERMEDIATE R&B

## 2024-05-16 PROCEDURE — 97535 SELF CARE MNGMENT TRAINING: CPT

## 2024-05-16 PROCEDURE — 97167 OT EVAL HIGH COMPLEX 60 MIN: CPT

## 2024-05-16 PROCEDURE — 80048 BASIC METABOLIC PNL TOTAL CA: CPT

## 2024-05-16 RX ADMIN — Medication 2000 MG: at 21:16

## 2024-05-16 RX ADMIN — Medication 2000 MG: at 13:47

## 2024-05-16 RX ADMIN — CARVEDILOL 25 MG: 12.5 TABLET, FILM COATED ORAL at 08:14

## 2024-05-16 RX ADMIN — APIXABAN 2.5 MG: 5 TABLET, FILM COATED ORAL at 08:15

## 2024-05-16 RX ADMIN — HYDRALAZINE HYDROCHLORIDE 50 MG: 50 TABLET, FILM COATED ORAL at 13:48

## 2024-05-16 RX ADMIN — APIXABAN 2.5 MG: 5 TABLET, FILM COATED ORAL at 21:16

## 2024-05-16 RX ADMIN — CARVEDILOL 25 MG: 12.5 TABLET, FILM COATED ORAL at 17:30

## 2024-05-16 RX ADMIN — SODIUM CHLORIDE, PRESERVATIVE FREE 10 ML: 5 INJECTION INTRAVENOUS at 21:16

## 2024-05-16 RX ADMIN — SODIUM CHLORIDE: 9 INJECTION, SOLUTION INTRAVENOUS at 08:20

## 2024-05-16 RX ADMIN — HYDRALAZINE HYDROCHLORIDE 50 MG: 50 TABLET, FILM COATED ORAL at 21:16

## 2024-05-16 RX ADMIN — ACETAMINOPHEN 650 MG: 325 TABLET ORAL at 13:47

## 2024-05-16 RX ADMIN — HYDRALAZINE HYDROCHLORIDE 50 MG: 50 TABLET, FILM COATED ORAL at 08:14

## 2024-05-16 RX ADMIN — Medication 2000 MG: at 05:55

## 2024-05-16 RX ADMIN — ARIPIPRAZOLE 15 MG: 15 TABLET ORAL at 08:14

## 2024-05-16 ASSESSMENT — PAIN DESCRIPTION - LOCATION: LOCATION: GENERALIZED

## 2024-05-16 ASSESSMENT — PAIN SCALES - GENERAL
PAINLEVEL_OUTOF10: 7
PAINLEVEL_OUTOF10: 4

## 2024-05-16 ASSESSMENT — PAIN DESCRIPTION - DESCRIPTORS: DESCRIPTORS: ACHING;JABBING;SHARP

## 2024-05-16 NOTE — CARE COORDINATION
Case Management Assessment  Initial Evaluation    Date/Time of Evaluation: 5/16/2024 11:22 AM  Assessment Completed by: Muriel Meyers RN    If patient is discharged prior to next notation, then this note serves as note for discharge by case management.    Patient Name: Stevo Avielz                   YOB: 1970  Diagnosis: JONNA (acute kidney injury) (HCC) [N17.9]                   Date / Time: 5/15/2024  9:14 AM    Patient Admission Status: Inpatient   Readmission Risk (Low < 19, Mod (19-27), High > 27): Readmission Risk Score: 14.7    Current PCP: Michael Godinez APRN - CNP  PCP verified by CM? Yes    Chart Reviewed: Yes      History Provided by: Significant Other  Patient Orientation: Alert and Oriented    Patient Cognition: Alert    Hospitalization in the last 30 days (Readmission):  No    If yes, Readmission Assessment in CM Navigator will be completed.    Advance Directives:      Code Status: Full Code   Patient's Primary Decision Maker is: Legal Next of Kin      Discharge Planning:    Patient lives with: (P) Spouse/Significant Other Type of Home: (P) House  Primary Care Giver: Spouse  Patient Support Systems include: Spouse/Significant Other   Current Financial resources: (P) None  Current community resources: (P) None  Current services prior to admission: (P) Home Care, Durable Medical Equipment ([facility])            Current DME: (P) Walker            Type of Home Care services:  (P) OT, PT, Skilled Therapy    ADLS  Prior functional level: (P) Assistance with the following:, Bathing, Dressing, Toileting, Cooking, Housework, Shopping, Mobility  Current functional level: (P) Assistance with the following:, Bathing, Toileting, Dressing, Cooking, Housework, Shopping, Mobility    PT AM-PAC:   /24  OT AM-PAC:   /24    Family can provide assistance at DC: (P) Yes  Would you like Case Management to discuss the discharge plan with any other family members/significant others, and if so, who? (P)

## 2024-05-16 NOTE — PROGRESS NOTES
Physician Progress Note      PATIENT:               KRISTA ABRAHAM  [facility] #:                  [id number]  :                       1970  ADMIT DATE:       5/15/2024 9:14 AM  DISCH DATE:  RESPONDING  PROVIDER #:        Dameon Pineda DO          QUERY TEXT:    Patient admitted with Cellulitis, noted to have Paroxysmal atrial fibrillation   and is maintained on Eliquis . If possible, please document in progress notes   and discharge summary if you are evaluating and/or treating any of the   following:    The medical record reflects the following:  Risk Factors: HTN,PAF, Lymphedema  Clinical Indicators:To ED s/p frequent falls. Found to have JONNA and Cellulitis   of BLE. Known Hx PAF on Eliquis 5mg BID.  Treatment: Continue home med Eliquis/carvedilol    Thank you,  [email or web address]  office hours  m-f 7-3  Options provided:  -- Secondary hypercoagulable state in a patient with atrial fibrillation  -- Other - I will add my own diagnosis  -- Disagree - Not applicable / Not valid  -- Disagree - Clinically unable to determine / Unknown  -- Refer to Clinical Documentation Reviewer    PROVIDER RESPONSE TEXT:    This patient has secondary hypercoagulable state in a patient with atrial   fibrillation.    Query created by: Shawnee Avilez on 2024 10:05 AM      Electronically signed by:  Dameon Pineda DO 2024 11:15 AM

## 2024-05-16 NOTE — PROGRESS NOTES
Occupational Therapy  Facility/Department: [facility] CAR 2- [facility]PD[facility]  Occupational Therapy Initial Assessment    Name: Stevo Avilez  : 1970  MRN: [id number]  Date of Service: 2024    Discharge Recommendations:  Patient would benefit from continued therapy after discharge       Chief Complaint   Patient presents with    Fatigue       Patient Diagnosis(es): There were no encounter diagnoses.  Past Medical History:  has a past medical history of Bipolar 1 disorder (HCC) and Hypertension.  Past Surgical History:  has no past surgical history on file.       Assessment   Performance deficits / Impairments: Decreased functional mobility ;Decreased ADL status;Decreased strength;Decreased cognition;Decreased balance;Decreased high-level IADLs;Decreased coordination  Assessment: Pt presents with decreased ADL IND secondary to deficits noted above, most significantly decreased activity tolerance, decreased sitting balance, and pain. Pt reports being IND with majority of ADLs at home, ambulating with AD to/from restroom. Currently, pt is requiring max x3 for all bed mobility and intermittent MAX A for maintaining sitting balance while seated unsupported. At this time, pt does not display the functional ability to safely return to prior living arrangement and complete ADLs. Continued OT services are warranted while pt is hospitalized and prior to eventual return home to maximize IND and safety.  Prognosis: Good  Decision Making: High Complexity  REQUIRES OT FOLLOW-UP: Yes  Activity Tolerance  Activity Tolerance: Patient limited by fatigue;Patient limited by pain        Plan   Occupational Therapy Plan  Times Per Week: 4-5  Current Treatment Recommendations: Strengthening, ROM, Balance training, Functional mobility training, Endurance training, Pain management, Safety education & training, Patient/Caregiver education & training, Equipment evaluation, education, & procurement, Positioning, Self-Care / ADL

## 2024-05-16 NOTE — CONSULTS
[facility] -   Infectious diseases evaluation  admission date 5/15/2024    reason for consultation:   RLE cellultis    Impression :   Current:  Bilateral lower extremity lymphedema  Right lower extremity cellulitis  Cellulitis R thigh lymphedema panus  JONNA on CKD  Paroxysmal A-fib    Other:    Discussion / summary of stay / plan of care/ Recommendations:     HENCE:   RLE and right thigh redness and edema are extensive - will take a long time to show resolution despite the correct AB    On ancef 5/15 w   Repeat CRP 5/16 to better assess response and decide on AB.  Compression RLE    Infection Control Recommendations   [place] Precautions  Contact Isolation       Antimicrobial Stewardship Recommendations   Simplification of therapy  Targeted therapy    History of Present Illness:   Initial history:  Stevo Avilez is a 53 y.o.-year-old male with a history of lymphedema obesity A-fib comes in because of frequent falls and inability to walk lower extremity lymphedema has gotten worse, creatinine 3.5 with a baseline of 1.8  Was noticed to have a right lower extremity cellulitis and admitted to the hospital, infectious consulted    He is on ancef        Interval changes  5/16/2024   Patient Vitals for the past 8 hrs:   BP Temp Temp src Pulse Resp SpO2   05/16/24 1347 (!) 153/97 98 °F (36.7 °C) Oral 70 -- 97 %   05/16/24 1302 128/78 -- -- -- -- --   05/16/24 1300 -- 97.9 °F (36.6 °C) -- 73 -- --   05/16/24 0814 128/79 -- -- 79 -- --   05/16/24 0730 128/79 98.1 °F (36.7 °C) Oral -- 16 96 %       Summary of relevant labs:  Labs:  W 13 - 11    Micro:  BC 5/15      Procedures      Cardiology      Imaging:      I have personally reviewed the past medical history, past surgical history, medications, social history, and family history, and I haveupdated the database accordingly.      Allergies:   Patient has no known allergies.     Review of Systems:      Review of Systems   Constitutional:  Positive for activity change.   HENT:  Negative for congestion.    Eyes:  Negative for discharge.   Respiratory:  Negative for apnea.    Cardiovascular:  Positive for leg swelling. Negative for chest pain.   Gastrointestinal:  Negative for abdominal distention.   Endocrine: Negative for cold intolerance.   Genitourinary:  Negative for dysuria.   Musculoskeletal:  Negative for arthralgias.   Skin:  Positive for color change (RLE red).   Allergic/Immunologic: Negative for immunocompromised state.   Neurological:  Negative for dizziness.   Hematological:  Negative for adenopathy.   Psychiatric/Behavioral:  Negative for agitation.        Physical Examination :       Physical Exam  Constitutional:       General: He is not in acute distress.     Appearance: Normal appearance. He is not ill-appearing.   HENT:      Head: Normocephalic and atraumatic.      Nose: Nose normal.      Mouth/Throat:      Mouth: Mucous membranes are moist.   Eyes:      General: No scleral icterus.     Conjunctiva/sclera: Conjunctivae normal.   Cardiovascular:      Rate and Rhythm: Normal rate and regular rhythm.      Heart sounds: No murmur heard.     No gallop.   Pulmonary:      Effort: No respiratory distress.      Breath sounds: No wheezing.   Abdominal:      General: There is no distension.      Tenderness: There is no abdominal tenderness.   Musculoskeletal:         General: No swelling, tenderness or deformity.      Cervical back: Neck supple. No rigidity.      Right lower leg: Edema present.      Left lower leg: Edema present.   Skin:     Coloration: Skin is not jaundiced.      Findings: Erythema (red RLE) present. No bruising.   Neurological:      Mental Status: He is alert and oriented to person, place, and time.      Cranial Nerves: No cranial nerve deficit.   Psychiatric:         Mood and Affect: Mood normal.         Thought Content: Thought content normal.         Past Medical History:     Past

## 2024-05-16 NOTE — PROGRESS NOTES
Physical Therapy  [facility]  Physical Therapy Initial Assessment    Name: Stevo Avilez  : 1970  MRN: [id number]  Date of Service: 2024  Chief Complaint   Patient presents with    Fatigue       Discharge Recommendations:   Further therapy recommended at discharge.     PT Equipment Recommendations  Equipment Needed: No      Patient Diagnosis(es): There were no encounter diagnoses.  Past Medical History:  has a past medical history of Bipolar 1 disorder (HCC) and Hypertension.  Past Surgical History:  has no past surgical history on file.    Assessment   Body Structures, Functions, Activity Limitations Requiring Skilled Therapeutic Intervention: Decreased functional mobility ;Decreased strength;Decreased endurance;Decreased balance  Assessment: Pt maxA+3 in supine to sit. Pt is unable to stand d/t pain, weakness. Pt reports being able to ambulate household distances at baseline, as recently as yesterday though self-reported PLOF information appears inconsistent. Pt has inadequate support upon discharge. Pt is currently unsafe to return to prior living arraingements. Pt would benefit from continued acute PT to address deficits.  Therapy Prognosis: Good  Decision Making: Medium Complexity  Requires PT Follow-Up: Yes  Activity Tolerance  Activity Tolerance: Patient limited by fatigue;Patient limited by endurance;Patient limited by pain     Plan   Physical Therapy Plan  General Plan:  (3-5x/wk)  Current Treatment Recommendations: Strengthening, Balance training, Functional mobility training, Transfer training, Endurance training, Gait training, Neuromuscular re-education, Home exercise program, Safety education & training, Patient/Caregiver education & training, Equipment evaluation, education, & procurement, Therapeutic activities  Safety Devices  Type of Devices: All fall risk precautions in place, Call light within reach, Left in bed, Patient at risk for falls, Gait belt,  recently  Mode of Transportation: Car  Occupation: Unemployed  Leisure & Hobbies: Watching TV, music videos  IADL Comments: Pt sleeps in lift chair  Additional Comments: Spouse is a counselor at [employer], works full time, reports 15 yo son is not reliable to provide assistance. Variable information provided as far as what pt was completing at home, initially stated IND with ADLs however then reports wife would assist with toileting and dressing tasks.  Vision/Hearing  Vision  Vision: Within Functional Limits  Hearing  Hearing: Within functional limits    Cognition   Orientation  Orientation Level: Oriented X4  Cognition  Overall Cognitive Status: Exceptions  Arousal/Alertness: Impaired  Following Commands: Follows multistep commands with increased time  Attention Span: Attends with cues to redirect  Memory: Appears intact  Safety Judgement: Appears intact  Problem Solving: Assistance required to generate solutions;Assistance required to implement solutions  Insights: Appears intact  Initiation: Requires cues for some  Sequencing: Requires cues for some  Cognition Comment: Pt with difficulty arousing at times requiring extra time to answer questions, variable information provided regarding PLOF     Objective   AROM RLE (degrees)  RLE General AROM: No AROM Plantarflexion, ankle maintained ~20 degrees plantarflexion. Dorsiflexion AROM lacking ~10 degrees from neutral. Knee flexion AROM ~70 degrees, soft end feel from thigh panniculitis. Knee EXT AROM WFL antigravity. No AROM Hip PROM ~90 degrees flexion.  AROM LLE (degrees)  LLE General AROM: Ankle WFL. Knee WFL. No AROM Hip PROM ~90 degrees flexion.  AROM RUE (degrees)  RUE General AROM: See OT  AROM LUE (degrees)  LUE General AROM: See OT  Strength RLE  Comment: MMT limited by body habitus, pain.  R Hip Flexion: 1/5  R Knee Flexion: 3+/5  R Knee Extension: 2+/5  R Ankle Dorsiflexion: 2-/5  R Ankle Plantar flexion: 1/5  Strength LLE  L Hip Flexion: 1/5  L Knee Flexion:

## 2024-05-16 NOTE — PROGRESS NOTES
[facility]  Office: [phone]  Phillip Pierson DO, Brendon Barney DO, Efra Aly DO, Dinh Garcia DO, Ariel Snyder MD, Mili Rangel MD, Emily Juarez MD, Brnadie Erazo MD,  Nikolas Obrien MD, Layton Mejia MD, Andrew Forbes MD,  Nicole Lai DO, Penny Arriaga MD, Doc Fatima MD, Massimo Pierson DO, Yamilet Harper MD,  Dameon Pineda DO, Brittany Zayas MD, Geneva Snowden MD, Keely Manzo MD, Ryan Kline MD,  Newton Mckeon MD, Yanna Vasquez MD, Neal Li MD, Liliana Turner MD, Luis Parkinson MD, Bishop Tariq MD, Amanuel Garcia DO, Antoine Conrad DO, Jesus Correa MD,  Pj Solomon MD, Shirley Waterhouse, CNP,  Katherin Bruno CNP, Robby Gallardo, CNP,  Adriana Foy, DNP, Shelley Buck, CNP, Giulia Sunshine, CNP, Merline Person CNP, Maura Viera, CNP, Whitney Albarran, PA-C, Roxy Mcclelland, PA-C, Jeanne Hernandez, CNP, Madeline Carreon, CNP, Vega Young, CNP, Sophia Malone, CNP, Yane Roberts, CNP, Inge Mathias, CNS, Katherin Nazario, CNP, Bobbi Fernandes CNP, Tracy Schwab, CNP         [facility]   IN-PATIENT SERVICE   [facility]    Progress Note    5/16/2024    8:25 AM    Name:   Stevo Avilez  MRN:     [id number]     Acct:      [id number]   Room:   2010/2010-01  IP Day:  1  Admit Date:  5/15/2024  9:14 AM    PCP:   No primary care provider on file.  Code Status:  Full Code    Subjective:     C/C:   Chief Complaint   Patient presents with    Fatigue     Interval History Status: not changed.     Vitals reviewed, afebrile and hemodynamically stable.  Saturating well on room air.  Labs reviewed, elevated BUN and creatinine 53 and 3.4 respectively.  Leukocytosis resolved with IV hydration.  Overnight patient had no significant events.    On examination patient  lying in bed but appears uncomfortable.  Complains of right lower extremity pain, calf pain and bilateral lower back pain.  Patient admits to having fallen prior to coming

## 2024-05-17 ENCOUNTER — APPOINTMENT (OUTPATIENT)
Dept: ULTRASOUND IMAGING | Age: 54
End: 2024-05-17
Payer: COMMERCIAL

## 2024-05-17 PROBLEM — M54.50 ACUTE BILATERAL LOW BACK PAIN WITHOUT SCIATICA: Status: ACTIVE | Noted: 2024-05-17
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
## 2024-05-17 LAB
ANION GAP SERPL CALCULATED.3IONS-SCNC: 10 MMOL/L (ref 9–16)
BASOPHILS # BLD: <0.03 K/UL (ref 0–0.2)
BASOPHILS NFR BLD: 0 % (ref 0–2)
BUN SERPL-MCNC: 49 MG/DL (ref 6–20)
CALCIUM SERPL-MCNC: 8.2 MG/DL (ref 8.6–10.4)
CHLORIDE SERPL-SCNC: 109 MMOL/L (ref 98–107)
CO2 SERPL-SCNC: 21 MMOL/L (ref 20–31)
CREAT SERPL-MCNC: 3.1 MG/DL (ref 0.7–1.2)
CRP SERPL HS-MCNC: 149 MG/L (ref 0–5)
EOSINOPHIL # BLD: 0.32 K/UL (ref 0–0.44)
EOSINOPHILS RELATIVE PERCENT: 4 % (ref 1–4)
ERYTHROCYTE [DISTWIDTH] IN BLOOD BY AUTOMATED COUNT: 14.9 % (ref 11.8–14.4)
GFR, ESTIMATED: 24 ML/MIN/1.73M2
GLUCOSE SERPL-MCNC: 109 MG/DL (ref 74–99)
HCT VFR BLD AUTO: 33.2 % (ref 40.7–50.3)
HGB BLD-MCNC: 10.1 G/DL (ref 13–17)
IMM GRANULOCYTES # BLD AUTO: 0.04 K/UL (ref 0–0.3)
IMM GRANULOCYTES NFR BLD: 1 %
LYMPHOCYTES NFR BLD: 0.82 K/UL (ref 1.1–3.7)
LYMPHOCYTES RELATIVE PERCENT: 10 % (ref 24–43)
MCH RBC QN AUTO: 30.2 PG (ref 25.2–33.5)
MCHC RBC AUTO-ENTMCNC: 30.4 G/DL (ref 28.4–34.8)
MCV RBC AUTO: 99.4 FL (ref 82.6–102.9)
MONOCYTES NFR BLD: 0.55 K/UL (ref 0.1–1.2)
MONOCYTES NFR BLD: 7 % (ref 3–12)
NEUTROPHILS NFR BLD: 78 % (ref 36–65)
NEUTS SEG NFR BLD: 6.1 K/UL (ref 1.5–8.1)
NRBC BLD-RTO: 0 PER 100 WBC
PLATELET # BLD AUTO: 163 K/UL (ref 138–453)
PMV BLD AUTO: 10.4 FL (ref 8.1–13.5)
POTASSIUM SERPL-SCNC: 4.4 MMOL/L (ref 3.7–5.3)
RBC # BLD AUTO: 3.34 M/UL (ref 4.21–5.77)
RBC # BLD: ABNORMAL 10*6/UL
SODIUM SERPL-SCNC: 140 MMOL/L (ref 136–145)
WBC OTHER # BLD: 7.9 K/UL (ref 3.5–11.3)

## 2024-05-17 PROCEDURE — 99232 SBSQ HOSP IP/OBS MODERATE 35: CPT | Performed by: STUDENT IN AN ORGANIZED HEALTH CARE EDUCATION/TRAINING PROGRAM

## 2024-05-17 PROCEDURE — 85025 COMPLETE CBC W/AUTO DIFF WBC: CPT

## 2024-05-17 PROCEDURE — 36415 COLL VENOUS BLD VENIPUNCTURE: CPT

## 2024-05-17 PROCEDURE — 99233 SBSQ HOSP IP/OBS HIGH 50: CPT | Performed by: INTERNAL MEDICINE

## 2024-05-17 PROCEDURE — 2580000003 HC RX 258: Performed by: STUDENT IN AN ORGANIZED HEALTH CARE EDUCATION/TRAINING PROGRAM

## 2024-05-17 PROCEDURE — 80048 BASIC METABOLIC PNL TOTAL CA: CPT

## 2024-05-17 PROCEDURE — 6360000002 HC RX W HCPCS: Performed by: STUDENT IN AN ORGANIZED HEALTH CARE EDUCATION/TRAINING PROGRAM

## 2024-05-17 PROCEDURE — 6370000000 HC RX 637 (ALT 250 FOR IP): Performed by: STUDENT IN AN ORGANIZED HEALTH CARE EDUCATION/TRAINING PROGRAM

## 2024-05-17 PROCEDURE — 2060000000 HC ICU INTERMEDIATE R&B

## 2024-05-17 PROCEDURE — 86140 C-REACTIVE PROTEIN: CPT

## 2024-05-17 PROCEDURE — 76770 US EXAM ABDO BACK WALL COMP: CPT

## 2024-05-17 RX ORDER — OXYCODONE HYDROCHLORIDE 5 MG/1
5 TABLET ORAL EVERY 4 HOURS PRN
Status: DISCONTINUED | OUTPATIENT
Start: 2024-05-17 | End: 2024-05-25 | Stop reason: HOSPADM

## 2024-05-17 RX ADMIN — APIXABAN 2.5 MG: 5 TABLET, FILM COATED ORAL at 20:12

## 2024-05-17 RX ADMIN — CARVEDILOL 25 MG: 12.5 TABLET, FILM COATED ORAL at 08:53

## 2024-05-17 RX ADMIN — HYDRALAZINE HYDROCHLORIDE 50 MG: 50 TABLET, FILM COATED ORAL at 12:50

## 2024-05-17 RX ADMIN — CARVEDILOL 25 MG: 12.5 TABLET, FILM COATED ORAL at 17:39

## 2024-05-17 RX ADMIN — Medication 2000 MG: at 12:51

## 2024-05-17 RX ADMIN — HYDRALAZINE HYDROCHLORIDE 50 MG: 50 TABLET, FILM COATED ORAL at 08:54

## 2024-05-17 RX ADMIN — ARIPIPRAZOLE 15 MG: 15 TABLET ORAL at 08:53

## 2024-05-17 RX ADMIN — Medication 2000 MG: at 05:21

## 2024-05-17 RX ADMIN — APIXABAN 2.5 MG: 5 TABLET, FILM COATED ORAL at 08:53

## 2024-05-17 RX ADMIN — HYDRALAZINE HYDROCHLORIDE 50 MG: 50 TABLET, FILM COATED ORAL at 20:13

## 2024-05-17 RX ADMIN — OXYCODONE 5 MG: 5 TABLET ORAL at 12:50

## 2024-05-17 RX ADMIN — Medication 2000 MG: at 20:13

## 2024-05-17 RX ADMIN — SODIUM CHLORIDE: 9 INJECTION, SOLUTION INTRAVENOUS at 04:45

## 2024-05-17 RX ADMIN — OXYCODONE 5 MG: 5 TABLET ORAL at 17:41

## 2024-05-17 RX ADMIN — ACETAMINOPHEN 650 MG: 325 TABLET ORAL at 08:54

## 2024-05-17 ASSESSMENT — PAIN SCALES - GENERAL
PAINLEVEL_OUTOF10: 2
PAINLEVEL_OUTOF10: 3
PAINLEVEL_OUTOF10: 3
PAINLEVEL_OUTOF10: 2
PAINLEVEL_OUTOF10: 7
PAINLEVEL_OUTOF10: 7
PAINLEVEL_OUTOF10: 2
PAINLEVEL_OUTOF10: 2
PAINLEVEL_OUTOF10: 5

## 2024-05-17 ASSESSMENT — PAIN DESCRIPTION - LOCATION
LOCATION: LEG
LOCATION: LEG

## 2024-05-17 ASSESSMENT — PAIN DESCRIPTION - ORIENTATION: ORIENTATION: RIGHT

## 2024-05-17 ASSESSMENT — PAIN DESCRIPTION - DESCRIPTORS: DESCRIPTORS: THROBBING

## 2024-05-17 NOTE — CARE COORDINATION
TRANSITIONAL CARE/DISCHARGE PLANNING ONGOING EVALUATION      Reason for Admission: JONNA (acute kidney injury) (HCC) [N17.9]         Patient goals/Transitional Plan:    Spoke with Rod with [facility], they are reviewing chart now awaiting call back

## 2024-05-17 NOTE — PROGRESS NOTES
[facility] -   Infectious diseases evaluation  admission date 5/15/2024    reason for consultation:   RLE cellultis    Impression :   Current:  Bilateral lower extremity lymphedema  Right lower extremity cellulitis  Cellulitis R thigh lymphedema panus  JONNA on CKD  Paroxysmal A-fib    Other:    Discussion / summary of stay / plan of care/ Recommendations:     HENCE:   RLE and right thigh redness and edema are extensive - will take a long time to show resolution despite the correct AB    On ancef 5/15 till 5/29   - 149 better  5/17 Cellulitis clinically better   Compression RLE    Infection Control Recommendations   [place] Precautions  Contact Isolation       Antimicrobial Stewardship Recommendations   Simplification of therapy  Targeted therapy    History of Present Illness:   Initial history:  Stevo Avilez is a 53 y.o.-year-old male with a history of lymphedema obesity A-fib comes in because of frequent falls and inability to walk lower extremity lymphedema has gotten worse, creatinine 3.5 with a baseline of 1.8  Was noticed to have a right lower extremity cellulitis and admitted to the hospital, infectious consulted    He is on ancef        Interval changes  5/17/2024   Patient Vitals for the past 8 hrs:   BP Temp Temp src Pulse Resp SpO2   05/17/24 1556 (!) 140/76 98.4 °F (36.9 °C) Oral -- 15 --   05/17/24 1320 -- -- -- -- 16 --   05/17/24 1147 (!) 153/97 98.6 °F (37 °C) Oral 79 15 91 %       5/17  Cellulitis better - CRP better - sluggish likely from pain meds - no fever      Summary of relevant labs:  Labs:  W 13 - 11    Micro:  BC 5/15      Procedures      Cardiology      Imaging:      I have personally reviewed the past medical history, past surgical history, medications, social history, and family history, and I haveupdated the database accordingly.      Allergies:   Patient has no known allergies.     Review of Systems:     Review of  Systems   Constitutional:  Positive for activity change.   HENT:  Negative for congestion.    Eyes:  Negative for pain, discharge and redness.   Respiratory:  Negative for apnea.    Cardiovascular:  Positive for leg swelling. Negative for chest pain.   Gastrointestinal:  Negative for abdominal distention.   Endocrine: Negative for cold intolerance.   Genitourinary:  Negative for dysuria.   Musculoskeletal:  Negative for arthralgias.   Skin:  Positive for color change (RLE red).   Allergic/Immunologic: Negative for immunocompromised state.   Neurological:  Negative for dizziness.   Hematological:  Negative for adenopathy.   Psychiatric/Behavioral:  Negative for agitation.        Physical Examination :       Physical Exam  Constitutional:       General: He is not in acute distress.     Appearance: Normal appearance. He is not ill-appearing or diaphoretic.   HENT:      Head: Normocephalic and atraumatic.      Nose: Nose normal.      Mouth/Throat:      Mouth: Mucous membranes are moist.   Eyes:      General: No scleral icterus.     Conjunctiva/sclera: Conjunctivae normal.   Cardiovascular:      Rate and Rhythm: Normal rate and regular rhythm.      Heart sounds: No murmur heard.     No gallop.   Pulmonary:      Effort: No respiratory distress.      Breath sounds: No wheezing.   Abdominal:      General: There is no distension.      Tenderness: There is no abdominal tenderness.   Musculoskeletal:         General: No swelling, tenderness or deformity.      Cervical back: Neck supple. No rigidity or tenderness.      Right lower leg: Edema present.      Left lower leg: Edema present.   Skin:     Coloration: Skin is not jaundiced.      Findings: Erythema (red RLE) present. No bruising.   Neurological:      Mental Status: He is alert and oriented to person, place, and time.      Cranial Nerves: No cranial nerve deficit.   Psychiatric:         Mood and Affect: Mood normal.         Thought Content: Thought content normal.

## 2024-05-17 NOTE — PROGRESS NOTES
[facility]  Office: [phone]  Phillip Pierson DO, Brendon Barney, DO, Efra Aly DO, Dinh Garcia, DO, Ariel Snyder MD, Mili Rangel MD, Emily Juarez MD, Brandie Erazo MD,  Nikolas Obrien MD, Layton Mejia MD, Andrew Forbes MD,  Nicole Lai DO, Penny Arriaga MD, Doc Fatima MD, Massimo Pierson DO, Yamilet Harper MD,  Dameon Pineda DO, Brittany Zayas MD, Geneva Snowden MD, Keely Manzo MD, Ryan Kline MD,  Newton Mckeon MD, Yanna Vasquez MD, Neal Li MD, Liliana Turner MD, Luis Parkinson MD, Bishop Tariq MD, Amanuel Garcia DO, Antoine Conrad DO, Jesus Correa MD,  Pj Solomon MD, Shirley Waterhouse, CNP,  Katherin Bruno CNP, Robby Gallardo, CNP,  Adriana Foy, DNP, Shelley Buck, CNP, Giulia Sunshine, CNP, Merline Person CNP, Maura Viera, CNP, Whitney Albarran, PA-C, Roxy Mcclelland PA-C, Jeanne Hernandez, CNP, Madeline Carreon, CNP, Vega Young, CNP, Sophia Malone, CNP, Yane Roberts, CNP, Inge Mathias, CNS, Katherin Nazario, CNP, Bobbi Fernandes CNP, Tracy Schwab, CNP         [facility]   IN-PATIENT SERVICE   [facility]    Progress Note    5/17/2024    8:37 AM    Name:   Stevo Avilez  MRN:     [id number]     Acct:      [id number]   Room:   2010/2010-01  IP Day:  2  Admit Date:  5/15/2024  9:14 AM    PCP:   Michael Godinez APRN - CNP  Code Status:  Full Code    Subjective:     C/C:   Chief Complaint   Patient presents with    Fatigue     Interval History Status: not changed.     Vitals reviewed, afebrile and hemodynamically stable.  Saturating well on room air.  Labs reviewed, elevated BUN and creatinine 49 and 3.1 respectively but improving.  Leukocytosis resolved with IV hydration.  CRP improving.  No leukocytosis, hemoglobin stable, platelets stable.  Urinalysis was not consistent with UTI.  Renal ultrasound reviewed with left kidney not seen but otherwise unremarkable.  Overnight patient had no significant  5/15/2024  No radiographic evidence of osteomyelitis. If there is concern for osteomyelitis, MRI is recommended.     Physical Examination:        General appearance:  alert, cooperative and in no distress.  Mental Status:  oriented to person, place and time and normal affect  Lungs:  clear to auscultation bilaterally, normal effort  Heart:  regular rate and rhythm, no murmur  Abdomen:  soft, nontender, nondistended, bowel sounds are present although difficult to auscultate due to body habitus.  Musculoskeletal: Bilateral lumbar back pain.  Improved.  Extremities: Lymphedema, venous stasis changes, erythema to bilateral lower extremities right greater than left.  Continues to improve on antibiotics.  Skin: Erythema to bilateral lower extremities right greater than left.  Chronic venous stasis changes.    Assessment:        Hospital Problems             Last Modified POA    * (Principal) JONNA (acute kidney injury) ([facility]) 5/15/2024 Yes    Obesity (BMI 35.0-39.9 without comorbidity) 5/15/2024 Yes    Bipolar 1 disorder ([facility]) 5/15/2024 Yes    Cellulitis of right lower extremity 5/15/2024 Yes    CRP elevated 5/16/2024 Yes     Plan:        Right lower extremity cellulitis.  Continue cefazolin 2 g every 8 hours.  Leukocytosis had resolved.  Infectious disease consulted given degree of CRP elevation.  CRP improving.    Chronic lymphedema.  Continue follow-up with outpatient surgery.    JONNA on CKD.  Likely secondary to overdiuresis with Bumex and concomitant use of lisinopril.  Stable.  Continues on gentle hydration.  Slowly improving with IV hydration.    Hypertension.  Continue hydralazine but hold lisinopril due to JONNA and hold amlodipine due to lymphedema.    Paroxysmal atrial fibrillation.  Continue Coreg and Eliquis.    Bipolar disorder.  Continues on aripiprazole    Debility.  PT/OT and SNF placement.    DVT prophylaxis: Eliquis.  GI prophylaxis: None.    Dameon Pineda DO  5/17/2024  8:37 AM

## 2024-05-18 PROBLEM — N18.9 ACUTE KIDNEY INJURY SUPERIMPOSED ON CKD (HCC): Status: ACTIVE | Noted: 2017-10-29

## 2024-05-18 PROBLEM — N18.32 STAGE 3B CHRONIC KIDNEY DISEASE (HCC): Status: ACTIVE | Noted: 2024-05-18

## 2024-05-18 PROBLEM — I10 ESSENTIAL HYPERTENSION: Status: ACTIVE | Noted: 2024-05-18

## 2024-05-18 PROBLEM — R60.0 BILATERAL LOWER EXTREMITY EDEMA: Status: ACTIVE | Noted: 2024-05-18

## 2024-05-18 PROBLEM — E66.01 MORBID OBESITY (HCC): Status: ACTIVE | Noted: 2024-05-18

## 2024-05-18 PROBLEM — N02.B9 IGA NEPHROPATHY: Status: ACTIVE | Noted: 2024-05-18

## 2024-05-18 LAB
ANION GAP SERPL CALCULATED.3IONS-SCNC: 9 MMOL/L (ref 9–16)
BASOPHILS # BLD: 0.03 K/UL (ref 0–0.2)
BASOPHILS NFR BLD: 1 % (ref 0–2)
BUN SERPL-MCNC: 43 MG/DL (ref 6–20)
CALCIUM SERPL-MCNC: 8.6 MG/DL (ref 8.6–10.4)
CHLORIDE SERPL-SCNC: 111 MMOL/L (ref 98–107)
CO2 SERPL-SCNC: 22 MMOL/L (ref 20–31)
CREAT SERPL-MCNC: 2.8 MG/DL (ref 0.7–1.2)
CRP SERPL HS-MCNC: 80.7 MG/L (ref 0–5)
EOSINOPHIL # BLD: 0.29 K/UL (ref 0–0.44)
EOSINOPHILS RELATIVE PERCENT: 5 % (ref 1–4)
ERYTHROCYTE [DISTWIDTH] IN BLOOD BY AUTOMATED COUNT: 14.8 % (ref 11.8–14.4)
GFR, ESTIMATED: 27 ML/MIN/1.73M2
GLUCOSE SERPL-MCNC: 95 MG/DL (ref 74–99)
HCT VFR BLD AUTO: 35.7 % (ref 40.7–50.3)
HGB BLD-MCNC: 10.7 G/DL (ref 13–17)
IMM GRANULOCYTES # BLD AUTO: 0.03 K/UL (ref 0–0.3)
IMM GRANULOCYTES NFR BLD: 1 %
LYMPHOCYTES NFR BLD: 0.76 K/UL (ref 1.1–3.7)
LYMPHOCYTES RELATIVE PERCENT: 13 % (ref 24–43)
MAGNESIUM SERPL-MCNC: 2.2 MG/DL (ref 1.6–2.6)
MCH RBC QN AUTO: 29.9 PG (ref 25.2–33.5)
MCHC RBC AUTO-ENTMCNC: 30 G/DL (ref 28.4–34.8)
MCV RBC AUTO: 99.7 FL (ref 82.6–102.9)
MONOCYTES NFR BLD: 0.44 K/UL (ref 0.1–1.2)
MONOCYTES NFR BLD: 7 % (ref 3–12)
NEUTROPHILS NFR BLD: 73 % (ref 36–65)
NEUTS SEG NFR BLD: 4.46 K/UL (ref 1.5–8.1)
NRBC BLD-RTO: 0 PER 100 WBC
PLATELET # BLD AUTO: 170 K/UL (ref 138–453)
PMV BLD AUTO: 9.7 FL (ref 8.1–13.5)
POTASSIUM SERPL-SCNC: 4.6 MMOL/L (ref 3.7–5.3)
POTASSIUM SERPL-SCNC: 4.7 MMOL/L (ref 3.7–5.3)
RBC # BLD AUTO: 3.58 M/UL (ref 4.21–5.77)
RBC # BLD: ABNORMAL 10*6/UL
SODIUM SERPL-SCNC: 142 MMOL/L (ref 136–145)
WBC OTHER # BLD: 6 K/UL (ref 3.5–11.3)

## 2024-05-18 PROCEDURE — 6370000000 HC RX 637 (ALT 250 FOR IP): Performed by: NURSE PRACTITIONER

## 2024-05-18 PROCEDURE — 2060000000 HC ICU INTERMEDIATE R&B

## 2024-05-18 PROCEDURE — 6370000000 HC RX 637 (ALT 250 FOR IP): Performed by: STUDENT IN AN ORGANIZED HEALTH CARE EDUCATION/TRAINING PROGRAM

## 2024-05-18 PROCEDURE — 80048 BASIC METABOLIC PNL TOTAL CA: CPT

## 2024-05-18 PROCEDURE — 99232 SBSQ HOSP IP/OBS MODERATE 35: CPT | Performed by: STUDENT IN AN ORGANIZED HEALTH CARE EDUCATION/TRAINING PROGRAM

## 2024-05-18 PROCEDURE — 6360000002 HC RX W HCPCS: Performed by: STUDENT IN AN ORGANIZED HEALTH CARE EDUCATION/TRAINING PROGRAM

## 2024-05-18 PROCEDURE — 83735 ASSAY OF MAGNESIUM: CPT

## 2024-05-18 PROCEDURE — 86140 C-REACTIVE PROTEIN: CPT

## 2024-05-18 PROCEDURE — 99254 IP/OBS CNSLTJ NEW/EST MOD 60: CPT | Performed by: INTERNAL MEDICINE

## 2024-05-18 PROCEDURE — 84132 ASSAY OF SERUM POTASSIUM: CPT

## 2024-05-18 PROCEDURE — 99232 SBSQ HOSP IP/OBS MODERATE 35: CPT | Performed by: INTERNAL MEDICINE

## 2024-05-18 PROCEDURE — 85025 COMPLETE CBC W/AUTO DIFF WBC: CPT

## 2024-05-18 PROCEDURE — 97530 THERAPEUTIC ACTIVITIES: CPT

## 2024-05-18 PROCEDURE — 36415 COLL VENOUS BLD VENIPUNCTURE: CPT

## 2024-05-18 PROCEDURE — 97535 SELF CARE MNGMENT TRAINING: CPT

## 2024-05-18 PROCEDURE — 2580000003 HC RX 258: Performed by: STUDENT IN AN ORGANIZED HEALTH CARE EDUCATION/TRAINING PROGRAM

## 2024-05-18 RX ORDER — CLONIDINE HYDROCHLORIDE 0.1 MG/1
0.2 TABLET ORAL 2 TIMES DAILY
Status: DISCONTINUED | OUTPATIENT
Start: 2024-05-18 | End: 2024-05-19

## 2024-05-18 RX ORDER — BUDESONIDE 1 MG/2ML
1 INHALANT ORAL 2 TIMES DAILY
Status: ON HOLD | COMMUNITY
End: 2024-05-18

## 2024-05-18 RX ORDER — M-VIT,TX,IRON,MINS/CALC/FOLIC 27MG-0.4MG
1 TABLET ORAL DAILY
COMMUNITY

## 2024-05-18 RX ORDER — CLONIDINE 0.3 MG/24H
1 PATCH, EXTENDED RELEASE TRANSDERMAL WEEKLY
COMMUNITY

## 2024-05-18 RX ORDER — NIFEDIPINE 60 MG/1
60 TABLET, EXTENDED RELEASE ORAL DAILY
COMMUNITY

## 2024-05-18 RX ORDER — SPIRONOLACTONE 25 MG/1
25 TABLET ORAL DAILY
COMMUNITY

## 2024-05-18 RX ORDER — GABAPENTIN 300 MG/1
300 CAPSULE ORAL 2 TIMES DAILY
COMMUNITY

## 2024-05-18 RX ORDER — TORSEMIDE 10 MG/1
5 TABLET ORAL DAILY
COMMUNITY

## 2024-05-18 RX ORDER — CLONIDINE HYDROCHLORIDE 0.3 MG/1
0.3 TABLET ORAL 2 TIMES DAILY
Status: ON HOLD | COMMUNITY
End: 2024-05-18

## 2024-05-18 RX ADMIN — SODIUM CHLORIDE, PRESERVATIVE FREE 10 ML: 5 INJECTION INTRAVENOUS at 20:12

## 2024-05-18 RX ADMIN — HYDRALAZINE HYDROCHLORIDE 50 MG: 50 TABLET, FILM COATED ORAL at 20:07

## 2024-05-18 RX ADMIN — CLONIDINE HYDROCHLORIDE 0.2 MG: 0.1 TABLET ORAL at 09:39

## 2024-05-18 RX ADMIN — Medication 2000 MG: at 20:13

## 2024-05-18 RX ADMIN — HYDRALAZINE HYDROCHLORIDE 50 MG: 50 TABLET, FILM COATED ORAL at 14:34

## 2024-05-18 RX ADMIN — OXYCODONE 5 MG: 5 TABLET ORAL at 21:32

## 2024-05-18 RX ADMIN — SODIUM CHLORIDE, PRESERVATIVE FREE 10 ML: 5 INJECTION INTRAVENOUS at 08:22

## 2024-05-18 RX ADMIN — HYDRALAZINE HYDROCHLORIDE 50 MG: 50 TABLET, FILM COATED ORAL at 08:21

## 2024-05-18 RX ADMIN — CLONIDINE HYDROCHLORIDE 0.2 MG: 0.1 TABLET ORAL at 20:07

## 2024-05-18 RX ADMIN — ARIPIPRAZOLE 15 MG: 15 TABLET ORAL at 08:20

## 2024-05-18 RX ADMIN — CARVEDILOL 25 MG: 12.5 TABLET, FILM COATED ORAL at 15:46

## 2024-05-18 RX ADMIN — Medication 2000 MG: at 05:53

## 2024-05-18 RX ADMIN — APIXABAN 2.5 MG: 5 TABLET, FILM COATED ORAL at 20:08

## 2024-05-18 RX ADMIN — Medication 2000 MG: at 12:34

## 2024-05-18 RX ADMIN — ACETAMINOPHEN 650 MG: 325 TABLET ORAL at 20:12

## 2024-05-18 RX ADMIN — CARVEDILOL 25 MG: 12.5 TABLET, FILM COATED ORAL at 08:24

## 2024-05-18 RX ADMIN — APIXABAN 2.5 MG: 5 TABLET, FILM COATED ORAL at 08:20

## 2024-05-18 ASSESSMENT — PAIN DESCRIPTION - LOCATION
LOCATION: LEG
LOCATION: LEG
LOCATION: KNEE

## 2024-05-18 ASSESSMENT — PAIN SCALES - GENERAL
PAINLEVEL_OUTOF10: 7
PAINLEVEL_OUTOF10: 4
PAINLEVEL_OUTOF10: 2
PAINLEVEL_OUTOF10: 6
PAINLEVEL_OUTOF10: 0
PAINLEVEL_OUTOF10: 5

## 2024-05-18 NOTE — PROGRESS NOTES
Per the pt's wife, the pt is currently taking these medications:    Hydralazine 100 mg twice a day  Carvedilol 25 mg twice a day  Allopurinol 300 mg once daily  Stool softeners twice a day  Iron pills twice a day  A multivitamin once daily  Clonidine 0.3 mg patch once a week  Torsemide 5 mg once daily  Nifedipine 60 mg once daily  Gabapentin 300 mg twice daily  Spironolactone 25 mg once daily  Abilify 15 mg once daily  Budesonide ([place]) 1.5 mg four times a day for a total of 6 mg  Sparsentan (Filspari) 400 mg once daily    The pt is currently not taking bumex or buspirone at home.

## 2024-05-18 NOTE — PROGRESS NOTES
[facility] -   Infectious diseases evaluation  admission date 5/15/2024    reason for consultation:   RLE cellultis    Impression :   Current:  Bilateral lower extremity lymphedema  Right lower extremity cellulitis  Cellulitis R thigh lymphedema panus  JONNA on CKD  Paroxysmal A-fib    Other:    Discussion / summary of stay / plan of care/ Recommendations:     HENCE:   RLE and right thigh redness and edema are extensive - will take a long time to show resolution despite the correct AB    On ancef 5/15- upon DC , switch to ceftriaxone 2 g daily till 6/5  Therapy lengthened due to expected lengthy time of recovery of the edematous area of the R thigh panus   - 149 - 80 better  5/17 Cellulitis clinically better - reconsiled w picc and ceftriaxone for DC  Compression RLE    Infection Control Recommendations   Jud Precautions  Contact Isolation       Antimicrobial Stewardship Recommendations   Simplification of therapy  Targeted therapy    History of Present Illness:   Initial history:  Stevo Avilez is a 53 y.o.-year-old male with a history of lymphedema obesity A-fib comes in because of frequent falls and inability to walk lower extremity lymphedema has gotten worse, creatinine 3.5 with a baseline of 1.8  Was noticed to have a right lower extremity cellulitis and admitted to the hospital, infectious consulted    He is on ancef        Interval changes  5/18/2024   Patient Vitals for the past 8 hrs:   BP Temp Temp src Pulse Resp SpO2   05/18/24 1151 (!) 159/96 96.8 °F (36 °C) Oral 77 20 98 %   05/18/24 0945 (!) 162/102 -- -- 77 17 94 %   05/18/24 0715 (!) 167/89 97.7 °F (36.5 °C) Oral 75 15 93 %       5/17  Cellulitis better - CRP better - sluggish likely from pain meds - no fever    5/18  Left panus much better less red and still very swollen and tender -  Ox 3 and abd soft non tender  WBC normal  No + cx      Summary of relevant labs:  Labs:  W 13 -  11    Micro:  BC 5/15      Procedures      Cardiology      Imaging:      I have personally reviewed the past medical history, past surgical history, medications, social history, and family history, and I haveupdated the database accordingly.      Allergies:   Patient has no known allergies.     Review of Systems:     Review of Systems   Constitutional:  Positive for activity change.   HENT:  Negative for congestion.    Eyes:  Negative for pain, discharge and redness.   Respiratory:  Negative for apnea.    Cardiovascular:  Positive for leg swelling. Negative for chest pain.   Gastrointestinal:  Negative for abdominal distention.   Endocrine: Negative for cold intolerance, heat intolerance and polydipsia.   Genitourinary:  Negative for dysuria.   Musculoskeletal:  Negative for arthralgias.   Skin:  Positive for color change (RLE red).   Allergic/Immunologic: Negative for immunocompromised state.   Neurological:  Negative for dizziness.   Hematological:  Negative for adenopathy.   Psychiatric/Behavioral:  Negative for agitation.        Physical Examination :       Physical Exam  Constitutional:       General: He is not in acute distress.     Appearance: Normal appearance. He is not ill-appearing or diaphoretic.   HENT:      Head: Normocephalic and atraumatic.      Nose: Nose normal.      Mouth/Throat:      Mouth: Mucous membranes are moist.   Eyes:      General: No scleral icterus.     Conjunctiva/sclera: Conjunctivae normal.   Cardiovascular:      Rate and Rhythm: Normal rate and regular rhythm.      Heart sounds: No murmur heard.     No gallop.   Pulmonary:      Effort: No respiratory distress.      Breath sounds: No wheezing.   Abdominal:      General: There is no distension.      Palpations: There is no mass.      Tenderness: There is no abdominal tenderness.      Hernia: No hernia is present.   Musculoskeletal:         General: No swelling, tenderness or deformity.      Cervical back: Neck supple. No  rigidity or tenderness.      Right lower leg: Edema present.      Left lower leg: Edema present.   Skin:     Coloration: Skin is not jaundiced.      Findings: Erythema (red RLE) present. No bruising.   Neurological:      Mental Status: He is alert and oriented to person, place, and time.      Cranial Nerves: No cranial nerve deficit.   Psychiatric:         Mood and Affect: Mood normal.         Thought Content: Thought content normal.         Past Medical History:     Past Medical History:   Diagnosis Date    Bipolar 1 disorder (HCC)     Hypertension        Past Surgical  History:   No past surgical history on file.    Medications:      cloNIDine  0.2 mg Oral BID    apixaban  2.5 mg Oral BID    ARIPiprazole  15 mg Oral Daily    carvedilol  25 mg Oral BID WC    hydrALAZINE  50 mg Oral TID    sodium chloride flush  5-40 mL IntraVENous 2 times per day    ceFAZolin  2,000 mg IntraVENous Q8H       Social History:     Social History     Socioeconomic History    Marital status:      Spouse name: Not on file    Number of children: Not on file    Years of education: Not on file    Highest education level: Not on file   Occupational History    Not on file   Tobacco Use    Smoking status: Never    Smokeless tobacco: Never   Substance and Sexual Activity    Alcohol use: Yes    Drug use: No    Sexual activity: Not on file   Other Topics Concern    Not on file   Social History Narrative    Not on file     Social Determinants of Health     Financial Resource Strain: Not on file   Food Insecurity: Not on file   Transportation Needs: Not on file   Physical Activity: Not on file   Stress: Not on file   Social Connections: Not on file   Intimate Partner Violence: Not on file   Housing Stability: Not on file       Family History:   No family history on file.   Medical Decision Making:   I have independently reviewed/ordered the following labs:    CBC with Differential:   Recent Labs     05/17/24  0714 05/18/24  0721   WBC 7.9 6.0

## 2024-05-18 NOTE — PROGRESS NOTES
[facility]  Office: [phone]  Phillip Pierson DO, Brendon Barney, DO, Efra Aly DO, Dinh Garcia, DO, Ariel Snydre MD, Mili Rangel MD, Emily Juarez MD, Brandie Erazo MD,  Nikolas Obrien MD, Layton Mejia MD, Andrew Forbes MD,  Nicole Lai DO, Penny Arriaga MD, Doc Fatima MD, Massimo Pierson DO, Yamilet Harper MD,  Dameon Pineda DO, Brittany Zayas MD, Geneva Snowden MD, Keely Manzo MD, Ryan Kline MD,  Newton Mckeon MD, aYnna Vasquez MD, Neal Li MD, Liliana Turner MD, Luis Parkinson MD, Bishop Tariq MD, Amanuel Garcia DO, Antoine Conrad DO, Jesus Correa MD,  Pj Solomon MD, Shirley Waterhouse, CNP,  Katherin Bruno CNP, Robyb Gallardo, CNP,  Adriana Foy, DNP, Shelley Buck, CNP, Giulia Sunshine, CNP, Merline Person, CNP, Maura Viera, CNP, Whitney Albarran, PA-C, Roxy Mcclelland PA-C, Jeanne Hernandez, CNP, Madeline Carreon, CNP, Vega Young, CNP, Sophia Malone, CNP, Yane Roberts, CNP, Inge Mathias, CNS, Katherin Nazario, CNP, Bobbi Fernandes CNP, Tracy Schwab, CNP         [facility]   IN-PATIENT SERVICE   [facility]    Progress Note    5/18/2024    10:52 AM    Name:   Stevo Avilez  MRN:     [id number]     Acct:      [id number]   Room:   2010/2010-01  IP Day:  3  Admit Date:  5/15/2024  9:14 AM    PCP:   Michael Godinez APRN - CNP  Code Status:  Full Code    Subjective:     C/C:   Chief Complaint   Patient presents with    Fatigue     Interval History Status: not changed.     Pt is resting in his bed, on NC.  Sleepy this morning and wanted me to come back later.  Denied any chest pain or shortness of breath.  He is concerned about lower extremity swelling.  On examination he has superficial wounds on his lower extremity along with lymphedema.  On Ancef, ID recommended switching to Rocephin 2 g daily till 6/5.    No acute events overnight, blood pressure on the higher side.  Lab work reviewed, creatinine trended down to  hold lisinopril due to JONNA and hold amlodipine due to lymphedema.    Paroxysmal atrial fibrillation.  Continue Coreg and Eliquis.    Bipolar disorder.  Continues on aripiprazole    Debility.  PT/OT and SNF placement.    DVT prophylaxis: Eliquis.  GI prophylaxis: None.    Luis Parkinson MD  5/18/2024  10:52 AM

## 2024-05-18 NOTE — PROGRESS NOTES
Physical Therapy  Facility/Department: [facility] CAR [facility]- [facility]  Physical Therapy Daily Treatment Note    Name: Stevo Avilez  : 1970  MRN: [id number]  Date of Service: 2024    Discharge Recommendations:  Patient would benefit from continued therapy after discharge   PT Equipment Recommendations  Equipment Needed: No      Patient Diagnosis(es): The primary encounter diagnosis was Fall, initial encounter. A diagnosis of Cellulitis of right lower extremity was also pertinent to this visit.  Past Medical History:  has a past medical history of Bipolar 1 disorder (HCC) and Hypertension.  Past Surgical History:  has no past surgical history on file.    Assessment   Body Structures, Functions, Activity Limitations Requiring Skilled Therapeutic Intervention: Decreased functional mobility ;Decreased strength;Decreased endurance;Decreased balance  Assessment: Pt required maxA x2 to perform bed mobility, tolerated sitting EOB ~15-20 minutes with modA initially progressing to CGA/SBA. Pt most limited by significant weakness, also decreased cognition and fear of falling. Recommending continued PT to address deficits as pt is currently unsafe to return to prior living arrangements.  Therapy Prognosis: Good  Requires PT Follow-Up: Yes  Activity Tolerance  Activity Tolerance: Patient limited by pain;Patient limited by endurance;Treatment limited secondary to decreased cognition     Plan   Physical Therapy Plan  General Plan:  (3-5x/week)  Current Treatment Recommendations: Strengthening, Balance training, Functional mobility training, Transfer training, Endurance training, Gait training, Neuromuscular re-education, Home exercise program, Safety education & training, Patient/Caregiver education & training, Equipment evaluation, education, & procurement, Therapeutic activities  Safety Devices  Type of Devices: All fall risk precautions in place, Call light within reach, Left in bed, Patient at risk for falls, Gait belt, Nurse  unable to safely attempt transfers d/t significant weakness, also impaired sitting balance, unable to get R foot on floor during sitting d/t large abscess.  Ambulation  More Ambulation?: No  Stairs/Curb  Stairs?: No     Balance  Posture: Poor  Sitting - Static: Poor  Sitting - Dynamic: Poor  Comments: Pt tolerated sitting EOB ~15-20 minutes with modA initially progressing to CGA/SBA once balance was established. Pt requiring significant BUE support on bed/bedrails to maintain sitting balance at EOB.    Exercise Treatment:   Unable to assess exercises d/t pt yelling out in pain when moving LEs.  Static Sitting Balance Exercises: Pt tolerated sitting EOB ~15-20 minutes with modA initially progressing to CGA/SBA, pt required significant UE support on bed rails during static/dynamic sitting balance activities.      AM-PAC - Mobility  AM-PAC Basic Mobility - Inpatient   How much help is needed turning from your back to your side while in a flat bed without using bedrails?: Total  How much help is needed moving from lying on your back to sitting on the side of a flat bed without using bedrails?: Total  How much help is needed moving to and from a bed to a chair?: Total  How much help is needed standing up from a chair using your arms?: Total  How much help is needed walking in hospital room?: Total  How much help is needed climbing 3-5 steps with a railing?: Total  AM-PAC Inpatient Mobility Raw Score : 6  AM-PAC Inpatient T-Scale Score : 23.55  Mobility Inpatient CMS 0-100% Score: 100  Mobility Inpatient CMS G-Code Modifier : CN    Goals  Short Term Goals  Time Frame for Short Term Goals: 14 visits  Short Term Goal 1: Pt will be modA+2 in bed mobility  Short Term Goal 2: Pt will be SBA In SOB sitting balance  Short Term Goal 3: Pt will be modA+2 in STS transfer  Short Term Goal 4: Pt will amb 5' modA+2 w/ RW or LRAD.       Education  Patient Education  Education Given To: Patient  Education Provided: Role of Therapy;Plan

## 2024-05-18 NOTE — CONSULTS
February of this year.  Nephrology Consult Note    Reason for Consult: Acute kidney injury on CKD stage IIIb  Requesting Physician:  Luis Parkinson MD     History Obtained From:  patient, electronic medical record    Chief Complaint:     Chief Complaint   Patient presents with    Fatigue       History of Present Illness:               This is a 53 y.o. male who presented to the hospital for evaluation of frequent falls, inability to ambulate.  Patient reporting such severity in his lymphedema and lower extremity swelling that he can no longer walk on his own.  He does follow with bariatrics and was recommended to lose 30 pounds prior to being evaluated for surgical intervention.  He was also found to have a right lower extremity cellulitis and was admitted for further workup, management and skilled nursing facility placement    PMH significant for morbid obesity, CPAP encouraged although patient declines, PAF on Eliquis, CKD stage IIIb, lymphedema, hypertension, nephrotic range proteinuria, biopsy-proven IgA nephropathy    UOP has been great, currently documented 2.9 L over the last 24 hours with a -2.5 L fluid deficit since admission.  Diuretics have been placed on hold    Labs at time of assessment showed sodium 142, potassium 4.7, chloride 111, CO2 22, BUN 43, creatinine 2.8, anion gap 9, EGFR 27, glucose 95, calcium 8.6, CRP 80.7, WBC 6.0, hemoglobin 10.7, hematocrit 35.7, platelet count 170    IVF started on admit, currently running at 50 mL an hour    [place] inserted on admission    Patient takes lisinopril and Bumex in the outpatient setting    UA on admission showed yellow color negative for glucose, bilirubin, ketones, nitrite or leukocyte esterase.  Specific gravity was 1.016 with +2 protein and a pH of 8.0 with few bacteria    Last kidney imaging on 5/17/2024 showed a right kidney measuring 12 cm in length and inability to visualize the left kidney no evidence of hydronephrosis nor stone    On  swelling  Extremities:  Bilateral lower extremity lymphedema  Assessment     JONNA secondary to overdiuresis.  Per recent documentation from 5/16/2024 from his nephrologist patient was prescribed 1 mg of Bumex 3 times a day, Bumex was changed to 2 mg p.o. daily upon discharge in March.  He was also given 5 mg of metolazone to take once a week for the next 3 weeks and it would appear he was getting 5 mg of metolazone daily for 3 weeks  CKD stage 3B secondary to biopsy-proven IgA nephropathy on ACEI, farxiga and tarpeyo (unable to tolerate prednisone).  Baseline serum creatinine 1.8 - 2.0 from most recent inpatient evaluation by his nephrology group, Dr Pozo.   Nephrotic range proteinuria  HTN  Monoclonal gammopathy  Left lower extremity cellulitis with chronic lymphadenopathy, recently treated in March with Zyvox and Zosyn  HFpEF  ID DMT2  PAF  Obesity with PRIMO, not using CPAP  Frequent falls with inability to ambulate    Plan     Renal ultrasound reviewed  Renal function slowly improving, patient was admitted with a creatinine of 3.5, currently down to 2.8.  Continue to hold Farxiga, lisinopril, and Bumex  Restart home dose of Catapres  Continue gentle IV fluid resuscitation for the next 24 hours  BMP in a.m.  Nursing to verify home medications, would appear patient is supposed to be on budesonide 6 mg daily, also appears to be missing some of his home antihypertensives, will restart home dose of Catapres 0.2 mg p.o. twice daily  Will continue to follow while admitted    Thank you for the consultation.  Please do not hesitate to call with questions.    Electronically signed by SIMRAN Pink NP on 5/18/2024 at 8:37 AM  Attending Physician Statement  I have discussed the care of Stevo Avilez, including pertinent history and exam findings with the resident/fellow. I have reviewed the key elements of all parts of the encounter with the resident/fellow. I have seen and examined the patient with the  resident/fellow.  I agree with the assessment and plan and status of the problem list as documented.    Patient seen and examined.  This is a 53-year-old morbidly obese gentleman with a history of stage IIIb CKD with biopsy-proven IgA nephropathy and in the past he has had near nephrotic proteinuria.  He typically follows with Dr. Pozo from [facility] regarding his chronic kidney disease.  Apparently he also has had history of chronic lower extremity lymphedema and recently I think his diuretics were adjusted as an outpatient.  It appears as though there may have been some confusion and he was taking more Zaroxolyn tablets than prescribed.  He then presented back to the hospital here because of falls, weakness not feeling well.  He has been treated for lower extremity cellulitis within the last 2 months as well.  His labs on this admission showed a creatinine of 3.8.  He typically tends to run a creatinine of around 2.   His diuretics were put on hold along with Farxiga and lisinopril and he was appropriately started on IV fluids.  His serum creatinine was 3.8 and has gradually come down to 2.8 today.  Renal ultrasound shows 12 cm right kidney, left kidney was not visualized well.  There was no hydronephrosis on the right.  He also recently underwent 24-hour urine collection and his proteinuria was quantified at 720 mg which is significantly better from 1.8 g in April and almost 21 g from February of this year.  He actually was taking tarpeyo because he does not tolerate prednisone.  Plan is to leave him on IV fluids for another day.  I would continue to hold his lisinopril diuretics and Farxiga for another 24 hours.  Hopefully we will be able to resume his Farxiga and lisinopril in the next day or so.  Following along    Miguel Villela MD , MD

## 2024-05-18 NOTE — PROGRESS NOTES
Occupational Therapy  Facility/Department: [facility]- [facility]  Occupational Therapy Daily Progress Note    Name: Stevo Avilez  : 1970  MRN: [id number]  Date of Service: 2024    Discharge Recommendations:Further therapy recommended at discharge.   Patient would benefit from continued therapy after discharge  OT Equipment Recommendations  Equipment Needed:  (CTA as pt. progresses.)       Patient Diagnosis(es): The primary encounter diagnosis was Fall, initial encounter. A diagnosis of Cellulitis of right lower extremity was also pertinent to this visit.  Past Medical History:  has a past medical history of Bipolar 1 disorder (HCC) and Hypertension.  Past Surgical History:  has no past surgical history on file.           Assessment   Performance deficits / Impairments: Decreased functional mobility ;Decreased ADL status;Decreased strength;Decreased cognition;Decreased balance;Decreased high-level IADLs;Decreased coordination  Prognosis: Good  Decision Making: High Complexity  REQUIRES OT FOLLOW-UP: Yes  Activity Tolerance  Activity Tolerance: Patient limited by fatigue;Patient limited by pain;Patient Tolerated treatment well        Plan   Occupational Therapy Plan  Times Per Week: 4-5  Current Treatment Recommendations: Strengthening, ROM, Balance training, Functional mobility training, Endurance training, Pain management, Safety education & training, Patient/Caregiver education & training, Equipment evaluation, education, & procurement, Positioning, Self-Care / ADL     Restrictions  Restrictions/Precautions  Restrictions/Precautions: General Precautions  Required Braces or Orthoses?: No  Position Activity Restriction  Other position/activity restrictions: up w/ assistance    Subjective   General  Patient assessed for rehabilitation services?: Yes  Family / Caregiver Present: No  General Comment  Comments: OK for OT eval per RN; pt reports 4/10 pain in R calf, however increases with movement/touch to LEs.  safety;Decreased awareness of need for assistance  Problem Solving: Assistance required to identify errors made  Initiation: Requires cues for some  Sequencing: Requires cues for some  Cognition Comment: Pt. is easy to re direct.  Pt. has fear of falling  Orientation  Overall Orientation Status: Within Functional Limits  Orientation Level: Oriented X4                  Education Given To: Patient  Education Provided: Role of Therapy;Plan of Care;Transfer Training;Energy Conservation;Fall Prevention Strategies  Education Provided Comments: Education providen on role of therapy in acute care, benefits of EOB activity and participation in therapy, safety during bed mobility with education on proper hand placement to assist with transition to EOB, continued encouragement provided due to pt fear of falling.  Education Outcome: Verbalized understanding;Continued education needed                                                                     AM-PAC - ADL  AM-PAC Daily Activity - Inpatient   How much help is needed for putting on and taking off regular lower body clothing?: Total  How much help is needed for bathing (which includes washing, rinsing, drying)?: A Lot  How much help is needed for toileting (which includes using toilet, bedpan, or urinal)?: Total  How much help is needed for putting on and taking off regular upper body clothing?: A Lot  How much help is needed for taking care of personal grooming?: A Little  How much help for eating meals?: A Little  AM-PAC Inpatient Daily Activity Raw Score: 12  AM-PAC Inpatient ADL T-Scale Score : 30.6  ADL Inpatient CMS 0-100% Score: 66.57  ADL Inpatient CMS G-Code Modifier : CL      Goals  Short Term Goals  Time Frame for Short Term Goals: By discharge, pt will  Short Term Goal 1: demo ability to perform bed mobility with MOD x2 to increase ability to pressure relieve while hospitalized to decrease risk for pressure injuries/skin breakdown.  Short Term Goal 2: demo

## 2024-05-19 LAB
ANION GAP SERPL CALCULATED.3IONS-SCNC: 13 MMOL/L (ref 9–16)
BASOPHILS # BLD: 0.04 K/UL (ref 0–0.2)
BASOPHILS NFR BLD: 1 % (ref 0–2)
BUN SERPL-MCNC: 41 MG/DL (ref 6–20)
CALCIUM SERPL-MCNC: 8.3 MG/DL (ref 8.6–10.4)
CHLORIDE SERPL-SCNC: 109 MMOL/L (ref 98–107)
CO2 SERPL-SCNC: 19 MMOL/L (ref 20–31)
CREAT SERPL-MCNC: 3.1 MG/DL (ref 0.7–1.2)
CRP SERPL HS-MCNC: 51.5 MG/L (ref 0–5)
EOSINOPHIL # BLD: 0.23 K/UL (ref 0–0.44)
EOSINOPHILS RELATIVE PERCENT: 4 % (ref 1–4)
ERYTHROCYTE [DISTWIDTH] IN BLOOD BY AUTOMATED COUNT: 14.5 % (ref 11.8–14.4)
GFR, ESTIMATED: 24 ML/MIN/1.73M2
GLUCOSE SERPL-MCNC: 95 MG/DL (ref 74–99)
HCT VFR BLD AUTO: 34.7 % (ref 40.7–50.3)
HGB BLD-MCNC: 10.5 G/DL (ref 13–17)
IMM GRANULOCYTES # BLD AUTO: 0.03 K/UL (ref 0–0.3)
IMM GRANULOCYTES NFR BLD: 1 %
LYMPHOCYTES NFR BLD: 0.73 K/UL (ref 1.1–3.7)
LYMPHOCYTES RELATIVE PERCENT: 13 % (ref 24–43)
MAGNESIUM SERPL-MCNC: 2.2 MG/DL (ref 1.6–2.6)
MCH RBC QN AUTO: 30.3 PG (ref 25.2–33.5)
MCHC RBC AUTO-ENTMCNC: 30.3 G/DL (ref 28.4–34.8)
MCV RBC AUTO: 100 FL (ref 82.6–102.9)
MONOCYTES NFR BLD: 0.44 K/UL (ref 0.1–1.2)
MONOCYTES NFR BLD: 8 % (ref 3–12)
NEUTROPHILS NFR BLD: 73 % (ref 36–65)
NEUTS SEG NFR BLD: 3.96 K/UL (ref 1.5–8.1)
NRBC BLD-RTO: 0 PER 100 WBC
PLATELET # BLD AUTO: 173 K/UL (ref 138–453)
PMV BLD AUTO: 9.7 FL (ref 8.1–13.5)
POTASSIUM SERPL-SCNC: 4.8 MMOL/L (ref 3.7–5.3)
RBC # BLD AUTO: 3.47 M/UL (ref 4.21–5.77)
RBC # BLD: ABNORMAL 10*6/UL
SODIUM SERPL-SCNC: 141 MMOL/L (ref 136–145)
WBC OTHER # BLD: 5.4 K/UL (ref 3.5–11.3)

## 2024-05-19 PROCEDURE — 2580000003 HC RX 258: Performed by: STUDENT IN AN ORGANIZED HEALTH CARE EDUCATION/TRAINING PROGRAM

## 2024-05-19 PROCEDURE — 6370000000 HC RX 637 (ALT 250 FOR IP): Performed by: NURSE PRACTITIONER

## 2024-05-19 PROCEDURE — 80048 BASIC METABOLIC PNL TOTAL CA: CPT

## 2024-05-19 PROCEDURE — 99232 SBSQ HOSP IP/OBS MODERATE 35: CPT | Performed by: INTERNAL MEDICINE

## 2024-05-19 PROCEDURE — 6360000002 HC RX W HCPCS: Performed by: STUDENT IN AN ORGANIZED HEALTH CARE EDUCATION/TRAINING PROGRAM

## 2024-05-19 PROCEDURE — 99232 SBSQ HOSP IP/OBS MODERATE 35: CPT | Performed by: STUDENT IN AN ORGANIZED HEALTH CARE EDUCATION/TRAINING PROGRAM

## 2024-05-19 PROCEDURE — 86140 C-REACTIVE PROTEIN: CPT

## 2024-05-19 PROCEDURE — 36415 COLL VENOUS BLD VENIPUNCTURE: CPT

## 2024-05-19 PROCEDURE — 6370000000 HC RX 637 (ALT 250 FOR IP): Performed by: STUDENT IN AN ORGANIZED HEALTH CARE EDUCATION/TRAINING PROGRAM

## 2024-05-19 PROCEDURE — 2060000000 HC ICU INTERMEDIATE R&B

## 2024-05-19 PROCEDURE — 83735 ASSAY OF MAGNESIUM: CPT

## 2024-05-19 PROCEDURE — 93005 ELECTROCARDIOGRAM TRACING: CPT | Performed by: STUDENT IN AN ORGANIZED HEALTH CARE EDUCATION/TRAINING PROGRAM

## 2024-05-19 PROCEDURE — 85025 COMPLETE CBC W/AUTO DIFF WBC: CPT

## 2024-05-19 RX ORDER — HYDRALAZINE HYDROCHLORIDE 50 MG/1
100 TABLET, FILM COATED ORAL EVERY 8 HOURS SCHEDULED
Status: DISCONTINUED | OUTPATIENT
Start: 2024-05-19 | End: 2024-05-25 | Stop reason: HOSPADM

## 2024-05-19 RX ORDER — CLONIDINE 0.3 MG/24H
1 PATCH, EXTENDED RELEASE TRANSDERMAL WEEKLY
Status: DISCONTINUED | OUTPATIENT
Start: 2024-05-19 | End: 2024-05-25 | Stop reason: HOSPADM

## 2024-05-19 RX ORDER — HYDRALAZINE HYDROCHLORIDE 50 MG/1
50 TABLET, FILM COATED ORAL EVERY 8 HOURS SCHEDULED
Status: DISCONTINUED | OUTPATIENT
Start: 2024-05-19 | End: 2024-05-19

## 2024-05-19 RX ORDER — HYDRALAZINE HYDROCHLORIDE 20 MG/ML
10 INJECTION INTRAMUSCULAR; INTRAVENOUS EVERY 6 HOURS PRN
Status: DISCONTINUED | OUTPATIENT
Start: 2024-05-19 | End: 2024-05-20

## 2024-05-19 RX ADMIN — HYDRALAZINE HYDROCHLORIDE 10 MG: 20 INJECTION INTRAMUSCULAR; INTRAVENOUS at 19:48

## 2024-05-19 RX ADMIN — CLONIDINE HYDROCHLORIDE 0.2 MG: 0.1 TABLET ORAL at 08:16

## 2024-05-19 RX ADMIN — OXYCODONE 5 MG: 5 TABLET ORAL at 20:33

## 2024-05-19 RX ADMIN — APIXABAN 2.5 MG: 5 TABLET, FILM COATED ORAL at 20:32

## 2024-05-19 RX ADMIN — POLYETHYLENE GLYCOL 3350 17 G: 17 POWDER, FOR SOLUTION ORAL at 05:23

## 2024-05-19 RX ADMIN — APIXABAN 2.5 MG: 5 TABLET, FILM COATED ORAL at 08:17

## 2024-05-19 RX ADMIN — SODIUM CHLORIDE, PRESERVATIVE FREE 10 ML: 5 INJECTION INTRAVENOUS at 20:35

## 2024-05-19 RX ADMIN — CARVEDILOL 25 MG: 12.5 TABLET, FILM COATED ORAL at 16:02

## 2024-05-19 RX ADMIN — HYDRALAZINE HYDROCHLORIDE 100 MG: 50 TABLET ORAL at 13:09

## 2024-05-19 RX ADMIN — HYDRALAZINE HYDROCHLORIDE 100 MG: 50 TABLET ORAL at 20:33

## 2024-05-19 RX ADMIN — CARVEDILOL 25 MG: 12.5 TABLET, FILM COATED ORAL at 08:16

## 2024-05-19 RX ADMIN — Medication 2000 MG: at 20:35

## 2024-05-19 RX ADMIN — ARIPIPRAZOLE 15 MG: 15 TABLET ORAL at 08:16

## 2024-05-19 RX ADMIN — HYDRALAZINE HYDROCHLORIDE 50 MG: 50 TABLET ORAL at 05:23

## 2024-05-19 ASSESSMENT — PAIN DESCRIPTION - LOCATION
LOCATION: LEG
LOCATION: LEG

## 2024-05-19 ASSESSMENT — PAIN SCALES - GENERAL
PAINLEVEL_OUTOF10: 4
PAINLEVEL_OUTOF10: 4
PAINLEVEL_OUTOF10: 2
PAINLEVEL_OUTOF10: 4
PAINLEVEL_OUTOF10: 6
PAINLEVEL_OUTOF10: 7

## 2024-05-19 ASSESSMENT — PAIN DESCRIPTION - ORIENTATION: ORIENTATION: RIGHT

## 2024-05-19 NOTE — PROGRESS NOTES
[facility]  Office: [phone]  Phillip Pierson DO, Brendon Barney, DO, Efra Aly DO, Dinh Garcia, DO, Ariel Snyder MD, Mili Rangel MD, Emily Juarez MD, Brandie Erazo MD,  Nikolas Obrien MD, Layton Mejia MD, Andrew Forbes MD,  Nicole Lai DO, Penny Arriaga MD, Doc Fatima MD, Massimo Pierson DO, Yamilet Harper MD,  Dameon Pineda DO, Brittany Zayas MD, Geneva Snowden MD, Keely Manzo MD, Ryan Kline MD,  Newton Mckeon MD, Yanna Vasquez MD, Neal Li MD, Liliana Turner MD, Luis Parkinson MD, Bishop Tariq MD, Amanuel Garcia DO, Antoine Conrad DO, Jesus Correa MD,  Pj Solomon MD, Shirley Waterhouse, CNP,  Katherin Bruno CNP, Robby Gallardo, CNP,  Adriana Foy, DNP, Shelley Buck, CNP, Giulia Sunshine, CNP, Merline Person CNP, Maura Viera, CNP, Whitney Albarran, PA-C, Roxy Mcclelland PA-C, Jeanne Hernandez, CNP, Madeline Carreon, CNP, Vega Young, CNP, Sophia Malone, CNP, Yane Roberts, CNP, Inge Mathias, CNS, Katherin Nazario, CNP, Bobbi Fernandes CNP, Tracy Schwab, CNP         [facility]   IN-PATIENT SERVICE   [facility]    Progress Note    5/19/2024    8:34 AM    Name:   Stevo Avilez  MRN:     [id number]     Acct:      [id number]   Room:   2010/2010-01  IP Day:  4  Admit Date:  5/15/2024  9:14 AM    PCP:   Michael Godinez APRN - CNP  Code Status:  Full Code    Subjective:     C/C:   Chief Complaint   Patient presents with    Fatigue     Interval History Status: not changed.     Vitals reviewed, afebrile but hypertensive the past 2 days. Otherwise hemodynamically stable and saturating well on room air.  Labs reviewed, CBC unremarkable, CRP downtrending, BUN and creatinine remain elevated 41 and 3.1 respectively.  Overnight patient had no significant events.    On examination patient resting comfortably in bed.  Currently in a flutter.  Complains of right leg pain but due to leg resting on handrail.  Patient's home meds  chloride **OR** potassium alternative oral replacement **OR** potassium chloride, magnesium sulfate, ondansetron **OR** ondansetron, polyethylene glycol, acetaminophen **OR** acetaminophen    Data:     Past Medical History:   has a past medical history of Bipolar 1 disorder (HCC) and Hypertension.    Social History:   reports that he has never smoked. He has never used smokeless tobacco. He reports current alcohol use. He reports that he does not use drugs.     Family History: No family history on file.    Vitals:  BP (!) 172/106   Pulse 66   Temp 98.6 °F (37 °C) (Axillary)   Resp 13   Ht 1.854 m (6' 1\")   Wt (!) 218.8 kg (482 lb 5.9 oz)   SpO2 97%   BMI 63.64 kg/m²   Temp (24hrs), Av.9 °F (36.6 °C), Min:96.8 °F (36 °C), Max:98.6 °F (37 °C)    No results for input(s): \"POCGLU\" in the last 72 hours.    I/O (24Hr):    Intake/Output Summary (Last 24 hours) at 2024 0834  Last data filed at 2024 0728  Gross per 24 hour   Intake 1620 ml   Output 4175 ml   Net -2555 ml         Labs:  Hematology:  Recent Labs     24  0714 24  0721 24  0641   WBC 7.9 6.0 5.4   RBC 3.34* 3.58* 3.47*   HGB 10.1* 10.7* 10.5*   HCT 33.2* 35.7* 34.7*   MCV 99.4 99.7 100.0   MCH 30.2 29.9 30.3   MCHC 30.4 30.0 30.3   RDW 14.9* 14.8* 14.5*    170 173   MPV 10.4 9.7 9.7   .0* 80.7* 51.5*       Chemistry:  Recent Labs     24  0714 24  0015 24  0721     --  142   K 4.4 4.6 4.7   *  --  111*   CO2 21  --  22   GLUCOSE 109*  --  95   BUN 49*  --  43*   CREATININE 3.1*  --  2.8*   MG  --  2.2  --    ANIONGAP 10  --  9   LABGLOM 24*  --  27*   CALCIUM 8.2*  --  8.6       No results for input(s): \"PROT\", \"LABALBU\", \"LABA1C\", \"I6FWEKF\", \"I1KSBWH\", \"FT4\", \"TSH\", \"AST\", \"ALT\", \"LDH\", \"GGT\", \"ALKPHOS\", \"BILITOT\", \"BILIDIR\", \"AMMONIA\", \"AMYLASE\", \"LIPASE\", \"LACTATE\", \"CHOL\", \"HDL\", \"CHOLHDLRATIO\", \"TRIG\", \"VLDL\", \"EDM41KH\", \"PHENYTOIN\", \"PHENYF\", \"URICACID\", \"POCGLU\" in the last

## 2024-05-19 NOTE — PROGRESS NOTES
Nephrology Progress Note    Patient:  Stevo Avilez; 53 y.o. MRN# [id number]  Location:    Attending:  Dameon Pineda DO  Admit Date:  5/15/2024   Hospital Day: 4    Subjective   This is a 53 y.o. male who presented to the hospital for evaluation of frequent falls, inability to ambulate.  Patient reporting such severity in his lymphedema and lower extremity swelling that he can no longer walk on his own.    He was also found to have a right lower extremity cellulitis and was admitted for further workup, management and skilled nursing facility placement. PMH significant for morbid obesity, CPAP encouraged although patient declines, PAF on Eliquis, CKD stage IIIb, lymphedema, hypertension, nephrotic range proteinuria, biopsy-proven IgA nephropathy     Patient seen and evaluated in room, no acute events overnight  Home medications were verified by nursing, selective home medications restarted  Patient continues on gentle IV fluid resuscitation  Diuretics continue to be on hold  Vital signs stable     Recent Labs     24  0714 24  0015 24  0721     --  142   K 4.4 4.6 4.7   *  --  111*   CO2 21  --  22   BUN 49*  --  43*   CREATININE 3.1*  --  2.8*   GLUCOSE 109*  --  95   CALCIUM 8.2*  --  8.6       Objective   VS: BP (!) 172/106   Pulse 66   Temp 98.6 °F (37 °C) (Axillary)   Resp 13   Ht 1.854 m (6' 1\")   Wt (!) 218.8 kg (482 lb 5.9 oz)   SpO2 97%   BMI 63.64 kg/m²   MAXIMUM TEMPERATURE OVER 24 HRS:  Temp (24hrs), Av.9 °F (36.6 °C), Min:96.8 °F (36 °C), Max:98.6 °F (37 °C)    24 HR BLOOD PRESSURE RANGE:  Systolic (24hrs), Av , Min:159 , Max:184   ; Diastolic (24hrs), Av, Min:91, Max:106    24 HR INTAKE/OUTPUT:    Intake/Output Summary (Last 24 hours) at 2024 0975  Last data filed at 2024 0728  Gross per 24 hour   Intake 1620 ml   Output 4175 ml   Net -2555 ml     WEIGHT: Patient Vitals for the past 96 hrs (Last 3 readings):   Weight   05/15/24

## 2024-05-20 LAB
ANION GAP SERPL CALCULATED.3IONS-SCNC: 8 MMOL/L (ref 9–16)
BASOPHILS # BLD: 0.03 K/UL (ref 0–0.2)
BASOPHILS NFR BLD: 0 % (ref 0–2)
BUN SERPL-MCNC: 35 MG/DL (ref 6–20)
CALCIUM SERPL-MCNC: 8.5 MG/DL (ref 8.6–10.4)
CHLORIDE SERPL-SCNC: 108 MMOL/L (ref 98–107)
CO2 SERPL-SCNC: 24 MMOL/L (ref 20–31)
CREAT SERPL-MCNC: 2.3 MG/DL (ref 0.7–1.2)
EOSINOPHIL # BLD: 0.33 K/UL (ref 0–0.44)
EOSINOPHILS RELATIVE PERCENT: 5 % (ref 1–4)
ERYTHROCYTE [DISTWIDTH] IN BLOOD BY AUTOMATED COUNT: 14.5 % (ref 11.8–14.4)
GFR, ESTIMATED: 34 ML/MIN/1.73M2
GLUCOSE SERPL-MCNC: 114 MG/DL (ref 74–99)
HCT VFR BLD AUTO: 33.4 % (ref 40.7–50.3)
HGB BLD-MCNC: 10.2 G/DL (ref 13–17)
IMM GRANULOCYTES # BLD AUTO: 0.05 K/UL (ref 0–0.3)
IMM GRANULOCYTES NFR BLD: 1 %
LYMPHOCYTES NFR BLD: 0.75 K/UL (ref 1.1–3.7)
LYMPHOCYTES RELATIVE PERCENT: 11 % (ref 24–43)
MAGNESIUM SERPL-MCNC: 2.1 MG/DL (ref 1.6–2.6)
MCH RBC QN AUTO: 29.7 PG (ref 25.2–33.5)
MCHC RBC AUTO-ENTMCNC: 30.5 G/DL (ref 28.4–34.8)
MCV RBC AUTO: 97.1 FL (ref 82.6–102.9)
MICROORGANISM SPEC CULT: NORMAL
MICROORGANISM SPEC CULT: NORMAL
MONOCYTES NFR BLD: 0.5 K/UL (ref 0.1–1.2)
MONOCYTES NFR BLD: 7 % (ref 3–12)
NEUTROPHILS NFR BLD: 76 % (ref 36–65)
NEUTS SEG NFR BLD: 5.11 K/UL (ref 1.5–8.1)
NRBC BLD-RTO: 0 PER 100 WBC
PLATELET # BLD AUTO: 184 K/UL (ref 138–453)
PMV BLD AUTO: 9.8 FL (ref 8.1–13.5)
POTASSIUM SERPL-SCNC: 4.4 MMOL/L (ref 3.7–5.3)
RBC # BLD AUTO: 3.44 M/UL (ref 4.21–5.77)
RBC # BLD: ABNORMAL 10*6/UL
SERVICE CMNT-IMP: NORMAL
SERVICE CMNT-IMP: NORMAL
SODIUM SERPL-SCNC: 140 MMOL/L (ref 136–145)
SPECIMEN DESCRIPTION: NORMAL
SPECIMEN DESCRIPTION: NORMAL
WBC OTHER # BLD: 6.8 K/UL (ref 3.5–11.3)

## 2024-05-20 PROCEDURE — 2580000003 HC RX 258: Performed by: STUDENT IN AN ORGANIZED HEALTH CARE EDUCATION/TRAINING PROGRAM

## 2024-05-20 PROCEDURE — 6370000000 HC RX 637 (ALT 250 FOR IP): Performed by: STUDENT IN AN ORGANIZED HEALTH CARE EDUCATION/TRAINING PROGRAM

## 2024-05-20 PROCEDURE — 80048 BASIC METABOLIC PNL TOTAL CA: CPT

## 2024-05-20 PROCEDURE — 83735 ASSAY OF MAGNESIUM: CPT

## 2024-05-20 PROCEDURE — 36569 INSJ PICC 5 YR+ W/O IMAGING: CPT

## 2024-05-20 PROCEDURE — C1751 CATH, INF, PER/CENT/MIDLINE: HCPCS

## 2024-05-20 PROCEDURE — 02HV33Z INSERTION OF INFUSION DEVICE INTO SUPERIOR VENA CAVA, PERCUTANEOUS APPROACH: ICD-10-PCS | Performed by: STUDENT IN AN ORGANIZED HEALTH CARE EDUCATION/TRAINING PROGRAM

## 2024-05-20 PROCEDURE — 6360000002 HC RX W HCPCS: Performed by: STUDENT IN AN ORGANIZED HEALTH CARE EDUCATION/TRAINING PROGRAM

## 2024-05-20 PROCEDURE — 99232 SBSQ HOSP IP/OBS MODERATE 35: CPT | Performed by: STUDENT IN AN ORGANIZED HEALTH CARE EDUCATION/TRAINING PROGRAM

## 2024-05-20 PROCEDURE — 2060000000 HC ICU INTERMEDIATE R&B

## 2024-05-20 PROCEDURE — 99232 SBSQ HOSP IP/OBS MODERATE 35: CPT | Performed by: INTERNAL MEDICINE

## 2024-05-20 PROCEDURE — 76937 US GUIDE VASCULAR ACCESS: CPT

## 2024-05-20 PROCEDURE — 85025 COMPLETE CBC W/AUTO DIFF WBC: CPT

## 2024-05-20 PROCEDURE — 36415 COLL VENOUS BLD VENIPUNCTURE: CPT

## 2024-05-20 RX ORDER — SPIRONOLACTONE 25 MG/1
25 TABLET ORAL DAILY
Status: DISCONTINUED | OUTPATIENT
Start: 2024-05-20 | End: 2024-05-24

## 2024-05-20 RX ORDER — LABETALOL HYDROCHLORIDE 5 MG/ML
10 INJECTION, SOLUTION INTRAVENOUS EVERY 6 HOURS PRN
Status: DISCONTINUED | OUTPATIENT
Start: 2024-05-20 | End: 2024-05-25 | Stop reason: HOSPADM

## 2024-05-20 RX ADMIN — ACETAMINOPHEN 650 MG: 325 TABLET ORAL at 17:06

## 2024-05-20 RX ADMIN — CARVEDILOL 25 MG: 12.5 TABLET, FILM COATED ORAL at 17:02

## 2024-05-20 RX ADMIN — CARVEDILOL 25 MG: 12.5 TABLET, FILM COATED ORAL at 08:30

## 2024-05-20 RX ADMIN — Medication 2000 MG: at 05:13

## 2024-05-20 RX ADMIN — SODIUM CHLORIDE, PRESERVATIVE FREE 10 ML: 5 INJECTION INTRAVENOUS at 21:59

## 2024-05-20 RX ADMIN — APIXABAN 2.5 MG: 5 TABLET, FILM COATED ORAL at 08:31

## 2024-05-20 RX ADMIN — Medication 2000 MG: at 12:11

## 2024-05-20 RX ADMIN — SPIRONOLACTONE 25 MG: 25 TABLET, FILM COATED ORAL at 08:30

## 2024-05-20 RX ADMIN — APIXABAN 2.5 MG: 5 TABLET, FILM COATED ORAL at 21:58

## 2024-05-20 RX ADMIN — HYDRALAZINE HYDROCHLORIDE 100 MG: 50 TABLET ORAL at 13:42

## 2024-05-20 RX ADMIN — HYDRALAZINE HYDROCHLORIDE 100 MG: 50 TABLET ORAL at 21:58

## 2024-05-20 RX ADMIN — ARIPIPRAZOLE 15 MG: 15 TABLET ORAL at 08:30

## 2024-05-20 RX ADMIN — NIFEDIPINE 60 MG: 60 TABLET, EXTENDED RELEASE ORAL at 12:11

## 2024-05-20 RX ADMIN — Medication 2000 MG: at 21:58

## 2024-05-20 RX ADMIN — SODIUM CHLORIDE, PRESERVATIVE FREE 10 ML: 5 INJECTION INTRAVENOUS at 08:31

## 2024-05-20 RX ADMIN — HYDRALAZINE HYDROCHLORIDE 100 MG: 50 TABLET ORAL at 05:12

## 2024-05-20 ASSESSMENT — ENCOUNTER SYMPTOMS
EYE REDNESS: 0
EYE DISCHARGE: 0
COLOR CHANGE: 1
APNEA: 0
ABDOMINAL DISTENTION: 0
PHOTOPHOBIA: 0
EYE PAIN: 0

## 2024-05-20 ASSESSMENT — PAIN SCALES - GENERAL: PAINLEVEL_OUTOF10: 3

## 2024-05-20 ASSESSMENT — PAIN DESCRIPTION - LOCATION: LOCATION: KNEE

## 2024-05-20 NOTE — PLAN OF CARE
Problem: Discharge Planning  Goal: Discharge to home or other facility with appropriate resources  5/19/2024 2153 by Roger Anderson RN  Outcome: Progressing  5/19/2024 0843 by Livier Le RN  Outcome: Progressing     Problem: Pain  Goal: Verbalizes/displays adequate comfort level or baseline comfort level  5/19/2024 2153 by Roger Anderson RN  Outcome: Progressing  5/19/2024 0843 by Livier Le RN  Outcome: Progressing     Problem: Skin/Tissue Integrity  Goal: Absence of new skin breakdown  Description: 1.  Monitor for areas of redness and/or skin breakdown  2.  Assess vascular access sites hourly  3.  Every 4-6 hours minimum:  Change oxygen saturation probe site  4.  Every 4-6 hours:  If on nasal continuous positive airway pressure, respiratory therapy assess nares and determine need for appliance change or resting period.  5/19/2024 2153 by Roger Anderson RN  Outcome: Progressing  5/19/2024 0843 by Livier Le RN  Outcome: Progressing     Problem: Safety - Adult  Goal: Free from fall injury  5/19/2024 2153 by Roger Anderson RN  Outcome: Progressing  5/19/2024 0843 by Livier Le RN  Outcome: Progressing

## 2024-05-20 NOTE — PLAN OF CARE
Problem: Discharge Planning  Goal: Discharge to home or other facility with appropriate resources  5/20/2024 1040 by Yasmin Rodriguez RN  Outcome: Progressing  5/19/2024 2153 by Roger Anderson RN  Outcome: Progressing     Problem: Pain  Goal: Verbalizes/displays adequate comfort level or baseline comfort level  5/20/2024 1040 by Yasmin Rodriguez RN  Outcome: Progressing  5/19/2024 2153 by Roger Anderson RN  Outcome: Progressing     Problem: Skin/Tissue Integrity  Goal: Absence of new skin breakdown  Description: 1.  Monitor for areas of redness and/or skin breakdown  2.  Assess vascular access sites hourly  3.  Every 4-6 hours minimum:  Change oxygen saturation probe site  4.  Every 4-6 hours:  If on nasal continuous positive airway pressure, respiratory therapy assess nares and determine need for appliance change or resting period.  5/20/2024 1040 by Yasmin Rodriguez RN  Outcome: Progressing  5/19/2024 2153 by Roger Anderson RN  Outcome: Progressing     Problem: Safety - Adult  Goal: Free from fall injury  5/20/2024 1040 by Yasmin Rodriguez RN  Outcome: Progressing  5/19/2024 2153 by Roger Anderson RN  Outcome: Progressing

## 2024-05-20 NOTE — PROGRESS NOTES
NEPHROLOGY PROGRESS NOTE      ASSESSMENT     Acute kidney injury nonoliguric secondary to prerenal injury from diuresis-creatinine peaked to 3.6 and improving  Chronic kidney disease stage IIIb secondary to biopsy-proven IgA nephropathy with baseline creatinine 1.8-2 and currently on ACE inhibitor therapy/Farxiga and sparsentan  Nephrotic range proteinuria  Left lower extremity cellulitis  Chronic lymphedema  Atrial fibrillation  Type 2 diabetes  Essential hypertension    PLAN     Urine output is excellent.  Hold diuretics today.    Will follow      SUBJECTIVE     Presented with frequent falls and noted to have lower extremity cellulitis.  He was taking Zaroxolyn practically every day instead of once a week.  Noted to have elevated creatinine prompting nephrology consultation.    Diuretics were on hold.  Renal function back to normal baseline.  Feels fine.  No chest pain shortness of breath.  Has chronic lower extremity swelling.  Has biopsy-proven IgA nephropathy with stage IV chronic kidney disease    OBJECTIVE     Vitals:    05/19/24 2300 05/20/24 0514 05/20/24 0715 05/20/24 1227   BP: (!) 163/97 (!) 171/98 (!) 192/120 (!) 184/108   Pulse: 75 71 58 72   Resp: 15 16 19 17   Temp: 99.1 °F (37.3 °C) 98.1 °F (36.7 °C) 98.2 °F (36.8 °C) 97.8 °F (36.6 °C)   TempSrc: Oral Oral Oral Axillary   SpO2: 97% 98% 95% 95%   Weight:       Height:         24HR INTAKE/OUTPUT:    Intake/Output Summary (Last 24 hours) at 5/20/2024 1347  Last data filed at 5/20/2024 1215  Gross per 24 hour   Intake 910 ml   Output 5545 ml   Net -4635 ml       General appearance:Awake, alert, in no acute distress  HEENT: PERRLA  Respiratory::vesicular breath sounds,no wheeze/crackles  Cardiovascular:S1 S2 normal,no gallop or organic murmur.  Abdomen:Non tender/non distended.Bowel sounds present  Extremities: No Cyanosis or Clubbing, present lower extremity edema  Neurological:Alert and oriented.No abnormalities of mood, affect, memory, mentation, or  tablet, Take 1 tablet by mouth daily (Patient not taking: Reported on 5/18/2024)  allopurinol (ZYLOPRIM) 300 MG tablet, Take 1 tablet by mouth daily  busPIRone (BUSPAR) 15 MG tablet, Take 15 mg by mouth 2 times daily (Patient not taking: Reported on 5/18/2024)  apixaban (ELIQUIS) 5 MG TABS tablet, Take by mouth 2 times daily (Patient not taking: Reported on 5/18/2024)  hydrALAZINE (APRESOLINE) 100 MG tablet, Take 1 tablet by mouth every 8 hours (Patient taking differently: Take 1 tablet by mouth 2 times daily)  ARIPiprazole (ABILIFY) 10 MG tablet, Take 1 tablet by mouth daily (Patient taking differently: Take 1.5 tablets by mouth daily)  carvedilol (COREG) 25 MG tablet, Take 1 tablet by mouth 2 times daily (with meals)  amLODIPine (NORVASC) 10 MG tablet, Take 1 tablet by mouth 2 times daily (Patient not taking: Reported on 5/18/2024)  miconazole (MICOTIN) 2 % powder, Apply topically 2 times daily.  docusate sodium (COLACE) 100 MG capsule, Take 1 capsule by mouth Daily (Patient taking differently: Take 1 capsule by mouth 2 times daily)  ferrous sulfate (FE TABS) 325 (65 Fe) MG EC tablet, Take 1 tablet by mouth 2 times daily    INVESTIGATIONS     Last 3 CMP:    Recent Labs     05/18/24  0721 05/19/24  0641 05/20/24  0554    141 140   K 4.7 4.8 4.4   * 109* 108*   CO2 22 19* 24   BUN 43* 41* 35*   CREATININE 2.8* 3.1* 2.3*   CALCIUM 8.6 8.3* 8.5*       Last 3 CBC:  Recent Labs     05/18/24  0721 05/19/24  0641 05/20/24  0554   WBC 6.0 5.4 6.8   RBC 3.58* 3.47* 3.44*   HGB 10.7* 10.5* 10.2*   HCT 35.7* 34.7* 33.4*   MCV 99.7 100.0 97.1   MCH 29.9 30.3 29.7   MCHC 30.0 30.3 30.5   RDW 14.8* 14.5* 14.5*    173 184   MPV 9.7 9.7 9.8       Please do not hesitate to call with questions    This note is created with the assistance of a speech-recognition program. While intending to generate a document that actually reflects the content of the visit, no guarantees can be provided that every mistake has been

## 2024-05-20 NOTE — PROCEDURES
PROCEDURE NOTE  Date: 5/20/2024   Name: Stevo Avilez  YOB: 1970    Procedures  History/labs/allergies reviewed  PICC placed for IV Rocephin through 6/5/2024    Benefits include stable long term intravenous access.    Risks include failure to obtain the desired result(s) of the procedure, discomfort, injury, the need for additional therapies, permanent loss of body function, bleeding from the site, arterial puncture, air embolism, nerve damage, hematoma, phlebitis, catheter fracture/rupture, catheter embolism, catheter occlusion, catheter migration, catheter site infection, unintentional/accidental removal of catheter, bloodstream infection, infiltration,  vein thrombosis, difficult catheter removal  Alternatives discussed including centrally inserted central catheter, as well as less invasive procedures such as multiple peripheral IVs, extended dwell catheters and midline placement    Consent signed and obtained by proceduralist   Placed by SAKINA LEIGH  Assisted by TESSIE LEIGH  Time out performed using two identifiers  Catheter type 4Fr single lumen picc  Product type Arrow 4 Fr Single lumen picc w/ navicurve and vps rhythm  Lot # [id number]  Expiration date 11/30/2024  Catheter size 4 [place]  Trimmed at 44cm  Total length inserted 44 cm  External catheter length 0 cm  Location Right basilic vein 0.9 cm CVR 2.2%  Number of attempts 1  Estimated blood loss 1 ml  Pre procedure cardiac Rhythm Paroxysmal afib per bedside telemetry and vps rhythm  Placement verified by- vps rhythm Max P wave noted by amplitude changes of the P wave, positive blood return, flushes easily  Special equipment used- ultrasound, micro-introducer technique and vps rhythm  Catheter secured with statlock  Dressing applied- Tegaderm CHG  Lidocaine administered intradermally conc. 1% 2 mL  PICC line education:   [ x ] Discussed with patient/Family or POA prior to signing Informed Procedural Consent.  Risks and Benefits along

## 2024-05-20 NOTE — PROGRESS NOTES
[facility] -   Infectious diseases evaluation  admission date 5/15/2024    reason for consultation:   RLE cellultis    Impression :   Current:  Bilateral lower extremity lymphedema  Right lower extremity cellulitis  Cellulitis R thigh lymphedema panus  JONNA on CKD  Paroxysmal A-fib    Other:    Discussion / summary of stay / plan of care/ Recommendations:     HENCE:   RLE and right thigh redness and edema are extensive - will take a long time to show resolution despite the correct AB    On ancef 5/15- upon DC , switch to ceftriaxone 2 g daily till 6/5  Therapy lengthened due to expected lengthy time of recovery of the edematous area of the R thigh panus   - 149 - 80 better  5/17 Cellulitis clinically better - reconsiled w picc and ceftriaxone for DC  Compression RLE    Infection Control Recommendations   [place] Precautions  Contact Isolation       Antimicrobial Stewardship Recommendations   Simplification of therapy  Targeted therapy    History of Present Illness:   Initial history:  Stevo Avilez is a 53 y.o.-year-old male with a history of lymphedema obesity A-fib comes in because of frequent falls and inability to walk lower extremity lymphedema has gotten worse, creatinine 3.5 with a baseline of 1.8  Was noticed to have a right lower extremity cellulitis and admitted to the hospital, infectious consulted    He is on ancef        Interval changes  5/19/2024   Patient Vitals for the past 8 hrs:   BP Temp Temp src Pulse Resp SpO2   05/19/24 1948 (!) 199/92 -- -- -- -- --   05/19/24 1918 (!) 199/92 98 °F (36.7 °C) Oral 72 17 95 %   05/19/24 1603 (!) 187/102 98.4 °F (36.9 °C) Axillary 64 15 --   05/19/24 1601 (!) 191/93 98.2 °F (36.8 °C) Oral 63 15 97 %       5/17  Cellulitis better - CRP better - sluggish likely from pain meds - no fever    5/18  Left panus much better less red and still very swollen and tender -  Ox 3 and abd soft non tender  WBC  on file   Intimate Partner Violence: Not on file   Housing Stability: Not on file       Family History:   No family history on file.   Medical Decision Making:   I have independently reviewed/ordered the following labs:    CBC with Differential:   Recent Labs     05/18/24  0721 05/19/24  0641   WBC 6.0 5.4   HGB 10.7* 10.5*   HCT 35.7* 34.7*    173   LYMPHOPCT 13* 13*   MONOPCT 7 8   EOSPCT 5* 4       BMP:  Recent Labs     05/18/24  0015 05/18/24  0721 05/19/24  0641   NA  --  142 141   K 4.6 4.7 4.8   CL  --  111* 109*   CO2  --  22 19*   BUN  --  43* 41*   CREATININE  --  2.8* 3.1*   MG 2.2  --  2.2       Hepatic Function Panel:   No results for input(s): \"PROT\", \"LABALBU\", \"BILIDIR\", \"IBILI\", \"BILITOT\", \"ALKPHOS\", \"ALT\", \"AST\" in the last 72 hours.    No results for input(s): \"RPR\" in the last 72 hours.  No results for input(s): \"HIV\" in the last 72 hours.  No results for input(s): \"BC\" in the last 72 hours.  Lab Results   Component Value Date/Time    CREATININE 3.1 05/19/2024 06:41 AM    GLUCOSE 95 05/19/2024 06:41 AM    GLUCOSE 129 05/01/2024 09:19 AM       Detailed results:        Thank you for allowing us to participate in the care of this patient.Please call with questions.    This note is created with the assistance of a speech recognition program.  While intending to generate adocument that actually reflects the content of the visit, the document can still have some errors including those of syntax and sound a like substitutions which may escape proof reading.  It such instances, actual meaningcan be extrapolated by contextual diversion.    Padma Pozo MD  Office: [phone]  Perfect serve / office [phone]

## 2024-05-20 NOTE — CARE COORDINATION
Spoke to Mallika from [facility]. Referral is in review. She will call back    1500 message left for admissions at [facility] requesting return call    [phone] patient's wife, Davina, updated. If [facility] cannot accept, [facility] is second choice. Referral sent

## 2024-05-20 NOTE — PROGRESS NOTES
[facility]  Office: [phone]  Phillip Pierson DO, Brendon Barney, DO, Efra Aly DO, Dinh Garcia, DO, Ariel Snyder MD, Mili Rangel MD, Emily Juarez MD, Brandie Erazo MD,  Nikolas Obrien MD, Layton Mejia MD, Andrew Forbes MD,  Nicole Lai DO, Penny Arriaga MD, Doc Fatima MD, Massimo Pierson DO, Yamilet Harper MD,  Dameon Pineda DO, Brittany Zayas MD, Geneva Snowden MD, Keely Manzo MD, Ryan Kline MD,  Newton Mckeon MD, Yanna Vasquez MD, Neal Li MD, Liliana Turner MD, Luis Parkinson MD, Bishop Tariq MD, Amanuel Garcia DO, Antoine Conrad DO, Jesus Correa MD,  Pj Solomon MD, Shirley Waterhouse, CNP,  Katherin Bruno CNP, Robby Gallardo, CNP,  Adriana Foy, DNP, Shelley Buck, CNP, Giulia Sunshine, CNP, Merline Person, CNP, Maura Viera, CNP, Whitney Albarran, PA-C, Roxy Mcclelland PA-C, Jeanne Hernandez, CNP, Madeline Carreon, CNP, Vega Young, CNP, Sophia Malone, CNP, Yane Roberts, CNP, Inge Mathias, CNS, Katherin Nazario, CNP, Bobbi Fernandes CNP, Tracy Schwab, CNP         [facility]   IN-PATIENT SERVICE   [facility]    Progress Note    5/20/2024    8:17 AM    Name:   Stevo Avilez  MRN:     [id number]     Acct:      [id number]   Room:   2010/2010-01  IP Day:  5  Admit Date:  5/15/2024  9:14 AM    PCP:   Michael Godinez APRN - CNP  Code Status:  Full Code    Subjective:     C/C:   Chief Complaint   Patient presents with    Fatigue     Interval History Status: not changed.     Vitals reviewed, afebrile but hypertensive the past 2 days. Otherwise hemodynamically stable and saturating well on room air.  Labs reviewed, BUN and creatinine significantly improved 35 and 2.3 respectively and patient is back to baseline.  Overnight patient had no significant events.    On examination patient resting comfortably in bed.  Resume home nifedipine and Aldactone for hypertension and await clearance from nephrology to resume  \"LDH\", \"GGT\", \"ALKPHOS\", \"BILITOT\", \"BILIDIR\", \"AMMONIA\", \"AMYLASE\", \"LIPASE\", \"LACTATE\", \"CHOL\", \"HDL\", \"CHOLHDLRATIO\", \"TRIG\", \"VLDL\", \"CPP32MN\", \"PHENYTOIN\", \"PHENYF\", \"URICACID\", \"POCGLU\" in the last 72 hours.    Invalid input(s): \"LABGGT\", \"LDLCHOLESTEROL\"    ABG:No results found for: \"POCPH\", \"PHART\", \"PH\", \"POCPCO2\", \"JBV7IRI\", \"PCO2\", \"POCPO2\", \"PO2ART\", \"PO2\", \"POCHCO3\", \"FRU0GGD\", \"HCO3\", \"NBEA\", \"PBEA\", \"BEART\", \"BE\", \"THGBART\", \"THB\", \"ZLM6LZV\", \"QPGG1HNI\", \"T5MIROLA\", \"O2SAT\", \"FIO2\"  Lab Results   Component Value Date/Time    SPECIAL L FA 05/15/2024 09:39 AM     Lab Results   Component Value Date/Time    CULTURE NO GROWTH 4 DAYS 05/15/2024 09:39 AM       Radiology:  XR TIBIA FIBULA RIGHT (2 VIEWS)    Result Date: 5/15/2024  No radiographic evidence of osteomyelitis. If there is concern for osteomyelitis, MRI is recommended.     Physical Examination:        General appearance:  alert, cooperative and in no distress.  Mental Status:  oriented to person, place and time and normal affect  Lungs:  clear to auscultation bilaterally, normal effort  Heart: Irregularly irregular rate and rhythm and atrial flutter, no murmur  Abdomen:  soft, nontender, nondistended, bowel sounds are present although difficult to auscultate due to body habitus.  Extremities: Lymphedema, venous stasis changes, erythema to bilateral lower extremities right greater than left.  Continues to improve on antibiotics.  Skin: Erythema to bilateral lower extremities right greater than left significantly improving. Chronic venous stasis changes.    Assessment:        Hospital Problems             Last Modified POA    * (Principal) JONNA (acute kidney injury) ([facility]) 5/19/2024 Yes    Obesity (BMI 35.0-39.9 without comorbidity) 5/15/2024 Yes    Bipolar 1 disorder ([facility]) 5/15/2024 Yes    Cellulitis of right lower extremity 5/15/2024 Yes    CRP elevated 5/16/2024 Yes    Acute bilateral low back pain without sciatica 5/17/2024 Yes    Fall 5/17/2024

## 2024-05-21 LAB
ANION GAP SERPL CALCULATED.3IONS-SCNC: 11 MMOL/L (ref 9–16)
BASOPHILS # BLD: 0.05 K/UL (ref 0–0.2)
BASOPHILS NFR BLD: 1 % (ref 0–2)
BUN SERPL-MCNC: 32 MG/DL (ref 6–20)
CALCIUM SERPL-MCNC: 8.6 MG/DL (ref 8.6–10.4)
CHLORIDE SERPL-SCNC: 106 MMOL/L (ref 98–107)
CO2 SERPL-SCNC: 22 MMOL/L (ref 20–31)
CREAT SERPL-MCNC: 2.2 MG/DL (ref 0.7–1.2)
EKG ATRIAL RATE: 271 BPM
EKG P AXIS: -91 DEGREES
EKG Q-T INTERVAL: 410 MS
EKG QRS DURATION: 116 MS
EKG QTC CALCULATION (BAZETT): 419 MS
EKG R AXIS: -33 DEGREES
EKG T AXIS: 72 DEGREES
EKG VENTRICULAR RATE: 63 BPM
EOSINOPHIL # BLD: 0.33 K/UL (ref 0–0.44)
EOSINOPHILS RELATIVE PERCENT: 4 % (ref 1–4)
ERYTHROCYTE [DISTWIDTH] IN BLOOD BY AUTOMATED COUNT: 14.3 % (ref 11.8–14.4)
GFR, ESTIMATED: 36 ML/MIN/1.73M2
GLUCOSE SERPL-MCNC: 96 MG/DL (ref 74–99)
HCT VFR BLD AUTO: 38 % (ref 40.7–50.3)
HGB BLD-MCNC: 10.8 G/DL (ref 13–17)
IMM GRANULOCYTES # BLD AUTO: 0.07 K/UL (ref 0–0.3)
IMM GRANULOCYTES NFR BLD: 1 %
LYMPHOCYTES NFR BLD: 0.88 K/UL (ref 1.1–3.7)
LYMPHOCYTES RELATIVE PERCENT: 12 % (ref 24–43)
MAGNESIUM SERPL-MCNC: 2.2 MG/DL (ref 1.6–2.6)
MCH RBC QN AUTO: 29.3 PG (ref 25.2–33.5)
MCHC RBC AUTO-ENTMCNC: 28.4 G/DL (ref 28.4–34.8)
MCV RBC AUTO: 103.3 FL (ref 82.6–102.9)
MONOCYTES NFR BLD: 0.59 K/UL (ref 0.1–1.2)
MONOCYTES NFR BLD: 8 % (ref 3–12)
NEUTROPHILS NFR BLD: 75 % (ref 36–65)
NEUTS SEG NFR BLD: 5.66 K/UL (ref 1.5–8.1)
NRBC BLD-RTO: 0 PER 100 WBC
PLATELET # BLD AUTO: 193 K/UL (ref 138–453)
PMV BLD AUTO: 9.1 FL (ref 8.1–13.5)
POTASSIUM SERPL-SCNC: 4.5 MMOL/L (ref 3.7–5.3)
RBC # BLD AUTO: 3.68 M/UL (ref 4.21–5.77)
RBC # BLD: ABNORMAL 10*6/UL
SODIUM SERPL-SCNC: 139 MMOL/L (ref 136–145)
WBC OTHER # BLD: 7.6 K/UL (ref 3.5–11.3)

## 2024-05-21 PROCEDURE — 6360000002 HC RX W HCPCS: Performed by: NURSE PRACTITIONER

## 2024-05-21 PROCEDURE — 2580000003 HC RX 258: Performed by: STUDENT IN AN ORGANIZED HEALTH CARE EDUCATION/TRAINING PROGRAM

## 2024-05-21 PROCEDURE — 97110 THERAPEUTIC EXERCISES: CPT

## 2024-05-21 PROCEDURE — 85025 COMPLETE CBC W/AUTO DIFF WBC: CPT

## 2024-05-21 PROCEDURE — 99232 SBSQ HOSP IP/OBS MODERATE 35: CPT | Performed by: INTERNAL MEDICINE

## 2024-05-21 PROCEDURE — 97530 THERAPEUTIC ACTIVITIES: CPT

## 2024-05-21 PROCEDURE — 99231 SBSQ HOSP IP/OBS SF/LOW 25: CPT | Performed by: INTERNAL MEDICINE

## 2024-05-21 PROCEDURE — 6370000000 HC RX 637 (ALT 250 FOR IP): Performed by: STUDENT IN AN ORGANIZED HEALTH CARE EDUCATION/TRAINING PROGRAM

## 2024-05-21 PROCEDURE — 80048 BASIC METABOLIC PNL TOTAL CA: CPT

## 2024-05-21 PROCEDURE — 97535 SELF CARE MNGMENT TRAINING: CPT

## 2024-05-21 PROCEDURE — 2060000000 HC ICU INTERMEDIATE R&B

## 2024-05-21 PROCEDURE — 36415 COLL VENOUS BLD VENIPUNCTURE: CPT

## 2024-05-21 PROCEDURE — 6360000002 HC RX W HCPCS: Performed by: STUDENT IN AN ORGANIZED HEALTH CARE EDUCATION/TRAINING PROGRAM

## 2024-05-21 PROCEDURE — 83735 ASSAY OF MAGNESIUM: CPT

## 2024-05-21 RX ORDER — HYDRALAZINE HYDROCHLORIDE 20 MG/ML
10 INJECTION INTRAMUSCULAR; INTRAVENOUS ONCE
Status: COMPLETED | OUTPATIENT
Start: 2024-05-21 | End: 2024-05-21

## 2024-05-21 RX ADMIN — SODIUM CHLORIDE, PRESERVATIVE FREE 10 ML: 5 INJECTION INTRAVENOUS at 20:59

## 2024-05-21 RX ADMIN — APIXABAN 2.5 MG: 5 TABLET, FILM COATED ORAL at 20:59

## 2024-05-21 RX ADMIN — Medication 2000 MG: at 20:59

## 2024-05-21 RX ADMIN — NIFEDIPINE 60 MG: 60 TABLET, EXTENDED RELEASE ORAL at 08:03

## 2024-05-21 RX ADMIN — ARIPIPRAZOLE 15 MG: 15 TABLET ORAL at 08:03

## 2024-05-21 RX ADMIN — Medication 2000 MG: at 13:24

## 2024-05-21 RX ADMIN — APIXABAN 2.5 MG: 5 TABLET, FILM COATED ORAL at 08:04

## 2024-05-21 RX ADMIN — HYDRALAZINE HYDROCHLORIDE 100 MG: 50 TABLET ORAL at 05:10

## 2024-05-21 RX ADMIN — CARVEDILOL 25 MG: 12.5 TABLET, FILM COATED ORAL at 08:03

## 2024-05-21 RX ADMIN — Medication 2000 MG: at 05:10

## 2024-05-21 RX ADMIN — HYDRALAZINE HYDROCHLORIDE 10 MG: 20 INJECTION INTRAMUSCULAR; INTRAVENOUS at 00:18

## 2024-05-21 RX ADMIN — HYDRALAZINE HYDROCHLORIDE 100 MG: 50 TABLET ORAL at 20:59

## 2024-05-21 RX ADMIN — HYDRALAZINE HYDROCHLORIDE 100 MG: 50 TABLET ORAL at 13:27

## 2024-05-21 RX ADMIN — SODIUM CHLORIDE, PRESERVATIVE FREE 10 ML: 5 INJECTION INTRAVENOUS at 08:04

## 2024-05-21 RX ADMIN — CARVEDILOL 25 MG: 12.5 TABLET, FILM COATED ORAL at 17:24

## 2024-05-21 RX ADMIN — SPIRONOLACTONE 25 MG: 25 TABLET, FILM COATED ORAL at 08:04

## 2024-05-21 ASSESSMENT — ENCOUNTER SYMPTOMS
EYE REDNESS: 0
EYE DISCHARGE: 0
ABDOMINAL DISTENTION: 0
PHOTOPHOBIA: 0
EYE PAIN: 0
APNEA: 0
COLOR CHANGE: 1

## 2024-05-21 NOTE — PLAN OF CARE
Problem: Discharge Planning  Goal: Discharge to home or other facility with appropriate resources  5/21/2024 0950 by Ehsan Cedeno RN  Outcome: Progressing  Flowsheets (Taken 5/21/2024 0800)  Discharge to home or other facility with appropriate resources: Identify barriers to discharge with patient and caregiver  5/21/2024 0950 by Ehsan Cedeno RN  Outcome: Progressing  Flowsheets (Taken 5/21/2024 0800)  Discharge to home or other facility with appropriate resources: Identify barriers to discharge with patient and caregiver     Problem: Pain  Goal: Verbalizes/displays adequate comfort level or baseline comfort level  Outcome: Progressing     Problem: Skin/Tissue Integrity  Goal: Absence of new skin breakdown  Description: 1.  Monitor for areas of redness and/or skin breakdown  2.  Assess vascular access sites hourly  3.  Every 4-6 hours minimum:  Change oxygen saturation probe site  4.  Every 4-6 hours:  If on nasal continuous positive airway pressure, respiratory therapy assess nares and determine need for appliance change or resting period.  Outcome: Progressing     Problem: Safety - Adult  Goal: Free from fall injury  Outcome: Progressing

## 2024-05-21 NOTE — PROGRESS NOTES
Physical Therapy  Facility/Department: [facility] CAR 2- STEPDOWN  Daily Treatment Note    Name: Stevo Avilez  : 1970  MRN: [id number]  Date of Service: 2024    Discharge Recommendations:  Patient would benefit from continued therapy after discharge   PT Equipment Recommendations  Equipment Needed: No      Patient Diagnosis(es): The primary encounter diagnosis was Fall, initial encounter. A diagnosis of Cellulitis of right lower extremity was also pertinent to this visit.  Past Medical History:  has a past medical history of Bipolar 1 disorder (HCC) and Hypertension.  Past Surgical History:  has no past surgical history on file.    Assessment   Body Structures, Functions, Activity Limitations Requiring Skilled Therapeutic Intervention: Decreased functional mobility ;Decreased strength;Decreased endurance;Decreased balance  Assessment: Pt required maxA x2 to perform bed mobility, tolerated sitting EOB ~25 minutes with modA initially progressing to CGA. Pt most limited by significant weakness, also decreased cognition and fear of falling. Recommending continued PT to address deficits as pt is currently unsafe to return to prior living arrangements.  Therapy Prognosis: Good  Activity Tolerance  Activity Tolerance: Patient limited by pain;Patient limited by endurance;Treatment limited secondary to decreased cognition     Plan   Physical Therapy Plan  General Plan:  (3-5x/week)  Current Treatment Recommendations: Strengthening, Balance training, Functional mobility training, Transfer training, Endurance training, Gait training, Neuromuscular re-education, Home exercise program, Safety education & training, Patient/Caregiver education & training, Equipment evaluation, education, & procurement, Therapeutic activities  Safety Devices  Type of Devices: All fall risk precautions in place, Call light within reach, Left in bed, Patient at risk for falls, Nurse notified  Restraints  Restraints Initially in Place: No  Score : 26.42  Mobility Inpatient CMS 0-100% Score: 92.36  Mobility Inpatient CMS G-Code Modifier : CM    Goals  Short Term Goals  Time Frame for Short Term Goals: 14 visits  Short Term Goal 1: Pt will be modA+2 in bed mobility  Short Term Goal 2: Pt will be SBA In SOB sitting balance  Short Term Goal 3: Pt will be modA+2 in STS transfer  Short Term Goal 4: Pt will amb 5' modA+2 w/ RW or LRAD.     Education  Patient Education  Education Given To: Patient  Education Provided: Role of Therapy;Plan of Care;Fall Prevention Strategies  Education Provided Comments: importance of mobility.  Education Method: Demonstration;Verbal  Barriers to Learning: Cognition  Education Outcome: Continued education needed;Verbalized understanding    Therapy Time   Individual Concurrent Group Co-treatment   Time In 1527         Time Out 1557         Minutes 30         Timed Code Treatment Minutes: 23 Minutes (Co-Treat with OG)  Co- treatment with OT warranted secondary to decreased patient safety and independence with functional mobility requiring skilled physical assistance of two professionals to simultaneously address individualized discipline goals. PT is addressing Bed mobility and seated balance , while OT is addressing their individualized functional mobility/self-care task.         Ilya Al, PTA

## 2024-05-21 NOTE — PROGRESS NOTES
NEPHROLOGY PROGRESS NOTE      ASSESSMENT     Acute kidney injury nonoliguric secondary to prerenal injury from diuresis-creatinine peaked to 3.6 and resolved  Chronic kidney disease stage IIIb secondary to biopsy-proven IgA nephropathy with baseline creatinine 1.8-2 and currently on ACE inhibitor therapy/Farxiga and sparsentan  Nephrotic range proteinuria  Left lower extremity cellulitis on antibiotics  Chronic lymphedema  Atrial fibrillation  Type 2 diabetes  Essential hypertension    PLAN     Urine output is excellent.  Hold diuretics today.    Antibiotics as per estimated GFR  Okay to resume Sparsentan      SUBJECTIVE     Presented with frequent falls and noted to have lower extremity cellulitis.  He was taking Zaroxolyn practically every day instead of once a week.  Noted to have elevated creatinine prompting nephrology consultation.    Diuretics were on hold.  Renal function back to normal baseline.  Urine output around 4 to 5 L last 48 hours okay to  Feels fine.  No chest pain shortness of breath.  Has chronic lower extremity swelling.  Has biopsy-proven IgA nephropathy with stage IV chronic kidney disease    OBJECTIVE     Vitals:    05/21/24 0800 05/21/24 0900 05/21/24 1000 05/21/24 1055   BP: (!) 179/99   (!) 168/93   Pulse: 66 70 78 83   Resp: 12 17 16 16   Temp: 98.1 °F (36.7 °C)   98.3 °F (36.8 °C)   TempSrc: Oral   Oral   SpO2:    97%   Weight:       Height:         24HR INTAKE/OUTPUT:    Intake/Output Summary (Last 24 hours) at 5/21/2024 1249  Last data filed at 5/21/2024 1031  Gross per 24 hour   Intake --   Output 4090 ml   Net -4090 ml       General appearance:Awake, alert, in no acute distress  HEENT: PERRLA  Respiratory::vesicular breath sounds,no wheeze/crackles  Cardiovascular:S1 S2 normal,no gallop or organic murmur.  Abdomen:Non tender/non distended.Bowel sounds present  Extremities: No Cyanosis or Clubbing, present lower extremity edema  Neurological:Alert and oriented.No abnormalities of  guarantees can be provided that every mistake has been identified and corrected by editing    Luis Daniel Leary MD MD, MRCP ([place]), FACP   5/21/2024 12:49 PM  [facility]

## 2024-05-21 NOTE — CARE COORDINATION
Received call from Odette from [facility]. She has misplaced referral. Manasa facesheet and clinicals    1110 received call from Catarina from [facility]. They are able to accept    1547 spoke to Odette from [facility]. She requested more documentation of blood cultures, UA, skin assessment-faxed

## 2024-05-21 NOTE — PROGRESS NOTES
Occupational Therapy  Facility/Department: [facility] CAR 2- STEPDOWN  Occupational Therapy Initial Assessment    Name: Stevo Avilez  : 1970  MRN: [id number]  Date of Service: 2024    Discharge Recommendations:  Patient would benefit from continued therapy after discharge  OT Equipment Recommendations  Other: CTA       Patient Diagnosis(es): The primary encounter diagnosis was Fall, initial encounter. A diagnosis of Cellulitis of right lower extremity was also pertinent to this visit.  Past Medical History:  has a past medical history of Bipolar 1 disorder (HCC) and Hypertension.  Past Surgical History:  has no past surgical history on file.      Assessment   Assessment: Pt displayed increased tolerance to session this date, requiring MAX x2 for bed mobility to transition to EOB. Pt tolerated EOB sitting for engagement in functional tasks for 25 minutes fluctuating between MOD A-CGA for sitting balance. Pt continues to be limited due to decreased activity tolerance, decreased static/dynamic sitting balance, pain, and continued encouragement required to engage in functional tasks due to pt's fear of falling and decreased motivation at times. Currently, pt does not display the functional abilities to safely return to prior living arrangements and safety complete ADLs with continued OT services are warranted while hospitalized and upon d/c to maximize pt's IND and safety.  Prognosis: Good  REQUIRES OT FOLLOW-UP: Yes  Activity Tolerance  Activity Tolerance: Patient limited by fatigue;Patient limited by pain;Patient Tolerated treatment well        Plan   Occupational Therapy Plan  Times Per Week: 4-5  Current Treatment Recommendations: Strengthening, ROM, Balance training, Functional mobility training, Endurance training, Pain management, Safety education & training, Patient/Caregiver education & training, Equipment evaluation, education, & procurement, Positioning, Self-Care / ADL  breakdown.  Short Term Goal 2: demo static/dynamic sitting balance for 8+ minutes with CGA to increase safety and IND with ADLs  Short Term Goal 3: demo ability to perform UB ADLs with SUP in order to increase ADL IND.  Short Term Goal 4: demo ability to complete functional transfers/functional mobility with MOD x2 in order to increase participation and IND with ADLs.  Short Term Goal 5: demo ability to perform LB ADLs with MOD A with use of AE/compensatory techniques in order to increase IND and participation with ADLs.  Short Term Goal 6: verbalize 2 non-pharmaceutical pain mgmt techniques to be implemented during functional tasks to increase participation and IND with ADLs.       Therapy Time   Individual Concurrent Group Co-treatment   Time In 1527         Time Out 1557         Minutes 30         Timed Code Treatment Minutes: 30 Minutes       Co- treatment with PT warranted secondary to decreased patient safety and independence with functional mobility requiring skilled physical assistance of two professionals to simultaneously address individualized discipline goals. OT is addressing static/dynamic sitting balance during engagement in ADLs, activity tolerance, and ADL IND , while PT is addressing their individualized functional mobility task.      Whitney Wilkerson, OTCHERYL, OTR/L

## 2024-05-21 NOTE — PROGRESS NOTES
[facility] -   Infectious diseases evaluation  admission date 5/15/2024    reason for consultation:   RLE cellultis    Impression :   Current:  Bilateral lower extremity lymphedema  Right lower extremity cellulitis  Cellulitis R thigh lymphedema panus  JONNA on CKD  Paroxysmal A-fib    Other:    Discussion / summary of stay / plan of care/ Recommendations:     HENCE:   RLE and right thigh redness and edema are extensive - will take a long time to show resolution despite the correct AB    On ancef 5/15- upon DC , switch to ceftriaxone 2 g daily till 6/5  Therapy lengthened due to expected lengthy time of recovery of the edematous area of the R thigh panus   - 149 - 80 better  5/17 Cellulitis clinically better - reconsiled w picc and ceftriaxone for DC  Compression RLE    Infection Control Recommendations   [place] Precautions  Contact Isolation       Antimicrobial Stewardship Recommendations   Simplification of therapy  Targeted therapy    History of Present Illness:   Initial history:  Stevo Avilez is a 53 y.o.-year-old male with a history of lymphedema obesity A-fib comes in because of frequent falls and inability to walk lower extremity lymphedema has gotten worse, creatinine 3.5 with a baseline of 1.8  Was noticed to have a right lower extremity cellulitis and admitted to the hospital, infectious consulted    He is on ancef        Interval changes  5/20/2024   Patient Vitals for the past 8 hrs:   BP Temp Temp src Pulse Resp SpO2   05/20/24 1940 (!) 185/106 97.9 °F (36.6 °C) Oral 71 15 --   05/20/24 1702 (!) 184/93 -- -- 71 -- --   05/20/24 1624 (!) 184/93 97.7 °F (36.5 °C) Oral 74 20 97 %       5/17  Cellulitis better - CRP better - sluggish likely from pain meds - no fever    5/18  Left panus much better less red and still very swollen and tender -  Ox 3 and abd soft non tender  WBC normal  No + cx    5/19  Cellulitis R LE much better - still very  on file   Transportation Needs: Not on file   Physical Activity: Not on file   Stress: Not on file   Social Connections: Not on file   Intimate Partner Violence: Not on file   Housing Stability: Not on file       Family History:   No family history on file.   Medical Decision Making:   I have independently reviewed/ordered the following labs:    CBC with Differential:   Recent Labs     05/19/24  0641 05/20/24  0554   WBC 5.4 6.8   HGB 10.5* 10.2*   HCT 34.7* 33.4*    184   LYMPHOPCT 13* 11*   MONOPCT 8 7   EOSPCT 4 5*       BMP:  Recent Labs     05/19/24  0641 05/20/24  0554    140   K 4.8 4.4   * 108*   CO2 19* 24   BUN 41* 35*   CREATININE 3.1* 2.3*   MG 2.2 2.1       Hepatic Function Panel:   No results for input(s): \"PROT\", \"LABALBU\", \"BILIDIR\", \"IBILI\", \"BILITOT\", \"ALKPHOS\", \"ALT\", \"AST\" in the last 72 hours.    No results for input(s): \"RPR\" in the last 72 hours.  No results for input(s): \"HIV\" in the last 72 hours.  No results for input(s): \"BC\" in the last 72 hours.  Lab Results   Component Value Date/Time    CREATININE 2.3 05/20/2024 05:54 AM    GLUCOSE 114 05/20/2024 05:54 AM    GLUCOSE 129 05/01/2024 09:19 AM       Detailed results:        Thank you for allowing us to participate in the care of this patient.Please call with questions.    This note is created with the assistance of a speech recognition program.  While intending to generate adocument that actually reflects the content of the visit, the document can still have some errors including those of syntax and sound a like substitutions which may escape proof reading.  It such instances, actual meaningcan be extrapolated by contextual diversion.    Padma Pozo MD  Office: [phone]  Perfect serve / office [phone]

## 2024-05-21 NOTE — PROGRESS NOTES
[facility]  Office: [phone]  Phillip Pierson DO, Brendon Barney, DO, Efra Aly DO, Dinh Garcia, DO, Ariel Snyder MD, Mili Rangel MD, Emily Juarez MD, Brandie Erazo MD,  Nikolas Obrien MD, Layton Mejia MD, Andrew Forbes MD,  Nicole Lai DO, Penny Arriaga MD, Doc Fatima MD, Massimo Pierson DO, Yamilet Harper MD,  Dameon Pineda DO, Brittany Zayas MD, Geneva Snowden MD, Keely Manzo MD, Ryan Kline MD,  Newton Mckeon MD, Yanna Vasquez MD, Neal Li MD, Liliana Turner MD, Luis Parkinson MD, Bishop Tariq MD, Amanuel Garcia DO, Antoine Conrad DO, Jesus Correa MD,  Pj Solomon MD, Shirley Waterhouse, CNP,  Katherin Bruno CNP, Robby Gallardo, CNP,  Adriana Foy, DNP, Shelley Buck, CNP, Giulia Sunshine, CNP, Merline Person CNP, Maura Viera, CNP, Whitney Albarran, PA-C, Roxy Mcclelland PA-C, Jeanne Hernandez, CNP, Madeline Carreon, CNP, Vega Young, CNP, Sophia Malone, CNP, Yane Roberts, CNP, Inge Mathias, CNS, Katherin Nazario, CNP, Bobbi Fernandes CNP, Tracy Schwab, CNP         [facility]   IN-PATIENT SERVICE   [facility]    Progress Note    5/21/2024    2:38 PM    Name:   Stevo Avilez  MRN:     [id number]     Acct:      [id number]   Room:   2010/2010-01  IP Day:  6  Admit Date:  5/15/2024  9:14 AM    PCP:   Michael Godinez APRN - CNP  Code Status:  Full Code    Subjective:     C/C:   Chief Complaint   Patient presents with    Fatigue     Interval History Status: improved.     Patient is resting in bed.  He states that he's tired because he didn''t sleep well last night.  He denies any SOB or pain.    Brief History:     Per my partner:  \"This is a 53-year-old male with a significant past medical history of hypertension, paroxysmal atrial fibrillation, CKD stage IIIb and obesity who initially presented emergency department with inability to ambulate and frequent falls secondary to his lymphedema.  In the emergency department

## 2024-05-22 LAB
ANION GAP SERPL CALCULATED.3IONS-SCNC: 10 MMOL/L (ref 9–16)
BUN SERPL-MCNC: 29 MG/DL (ref 6–20)
CALCIUM SERPL-MCNC: 8.3 MG/DL (ref 8.6–10.4)
CHLORIDE SERPL-SCNC: 105 MMOL/L (ref 98–107)
CO2 SERPL-SCNC: 24 MMOL/L (ref 20–31)
CREAT SERPL-MCNC: 2.1 MG/DL (ref 0.7–1.2)
GFR, ESTIMATED: 37 ML/MIN/1.73M2
GLUCOSE SERPL-MCNC: 114 MG/DL (ref 74–99)
POTASSIUM SERPL-SCNC: 4.3 MMOL/L (ref 3.7–5.3)
SODIUM SERPL-SCNC: 139 MMOL/L (ref 136–145)

## 2024-05-22 PROCEDURE — 80048 BASIC METABOLIC PNL TOTAL CA: CPT

## 2024-05-22 PROCEDURE — 99232 SBSQ HOSP IP/OBS MODERATE 35: CPT | Performed by: INTERNAL MEDICINE

## 2024-05-22 PROCEDURE — 6370000000 HC RX 637 (ALT 250 FOR IP): Performed by: INTERNAL MEDICINE

## 2024-05-22 PROCEDURE — 6370000000 HC RX 637 (ALT 250 FOR IP): Performed by: STUDENT IN AN ORGANIZED HEALTH CARE EDUCATION/TRAINING PROGRAM

## 2024-05-22 PROCEDURE — 6360000002 HC RX W HCPCS: Performed by: STUDENT IN AN ORGANIZED HEALTH CARE EDUCATION/TRAINING PROGRAM

## 2024-05-22 PROCEDURE — 99231 SBSQ HOSP IP/OBS SF/LOW 25: CPT | Performed by: INTERNAL MEDICINE

## 2024-05-22 PROCEDURE — 2580000003 HC RX 258: Performed by: STUDENT IN AN ORGANIZED HEALTH CARE EDUCATION/TRAINING PROGRAM

## 2024-05-22 PROCEDURE — 36415 COLL VENOUS BLD VENIPUNCTURE: CPT

## 2024-05-22 PROCEDURE — 2060000000 HC ICU INTERMEDIATE R&B

## 2024-05-22 RX ORDER — TORSEMIDE 20 MG/1
10 TABLET ORAL DAILY
Status: DISCONTINUED | OUTPATIENT
Start: 2024-05-22 | End: 2024-05-25 | Stop reason: HOSPADM

## 2024-05-22 RX ADMIN — HYDRALAZINE HYDROCHLORIDE 100 MG: 50 TABLET ORAL at 14:05

## 2024-05-22 RX ADMIN — APIXABAN 2.5 MG: 5 TABLET, FILM COATED ORAL at 21:21

## 2024-05-22 RX ADMIN — SODIUM CHLORIDE, PRESERVATIVE FREE 10 ML: 5 INJECTION INTRAVENOUS at 08:10

## 2024-05-22 RX ADMIN — HYDRALAZINE HYDROCHLORIDE 100 MG: 50 TABLET ORAL at 05:07

## 2024-05-22 RX ADMIN — Medication 2000 MG: at 14:11

## 2024-05-22 RX ADMIN — APIXABAN 2.5 MG: 5 TABLET, FILM COATED ORAL at 08:09

## 2024-05-22 RX ADMIN — SPIRONOLACTONE 25 MG: 25 TABLET, FILM COATED ORAL at 08:10

## 2024-05-22 RX ADMIN — CARVEDILOL 37.5 MG: 25 TABLET, FILM COATED ORAL at 17:11

## 2024-05-22 RX ADMIN — SODIUM CHLORIDE, PRESERVATIVE FREE 10 ML: 5 INJECTION INTRAVENOUS at 21:17

## 2024-05-22 RX ADMIN — Medication 2000 MG: at 05:07

## 2024-05-22 RX ADMIN — Medication 2000 MG: at 21:17

## 2024-05-22 RX ADMIN — NIFEDIPINE 60 MG: 60 TABLET, EXTENDED RELEASE ORAL at 08:10

## 2024-05-22 RX ADMIN — TORSEMIDE 10 MG: 20 TABLET ORAL at 10:21

## 2024-05-22 RX ADMIN — HYDRALAZINE HYDROCHLORIDE 100 MG: 50 TABLET ORAL at 21:21

## 2024-05-22 RX ADMIN — CARVEDILOL 25 MG: 12.5 TABLET, FILM COATED ORAL at 08:10

## 2024-05-22 RX ADMIN — ARIPIPRAZOLE 15 MG: 15 TABLET ORAL at 08:09

## 2024-05-22 ASSESSMENT — ENCOUNTER SYMPTOMS
COLOR CHANGE: 1
APNEA: 0
EYE DISCHARGE: 0
PHOTOPHOBIA: 0
EYE REDNESS: 0
ABDOMINAL DISTENTION: 0
EYE PAIN: 0

## 2024-05-22 NOTE — PROGRESS NOTES
Pharmacy Medication Counseling Note    Apixaban counseling provided to Stevo Avilez   Handouts provided to patient include: apixaban patient information    Counseling points included:   1. Indication for apixaban: A-Fib   2. Explanation of apixaban (blood thinner)   3. Dose and directions, including missed doses and timing of medication  4. Side effects: bleeding/bruising   5. Potential drug interactions   A. Cytochrome P450 3A4 inhibitors or inducers, Bridget's Wort, grapefuit juice  6. Signs and symptoms of VTE and stroke reviewed  7. Contact prescriber when:    A. Changes made to other medications   B. Signs or symptoms of VTE or stroke   C. Uncontrolled bleeding or unusual bruising    Answered all medication-related questions and patient verbalized understanding.

## 2024-05-22 NOTE — PROGRESS NOTES
[facility]  Office: [phone]  Phillip Pierson DO, Brendon Barney, DO, Efra Aly DO, Dinh Garcia, DO, Ariel Snyder MD, Mili Rangel MD, Emily Juarez MD, Brandie Erazo MD,  Nikolas Obrien MD, Layton Mejia MD, Andrew Forbes MD,  Nicole Lai DO, Penny Arriaga MD, Doc Fatima MD, Massimo Pierson DO, Yamilet Harper MD,  Dameon Pineda DO, Brittany Zayas MD, Geneva Snowden MD, Keely Manzo MD, Ryan Kline MD,  Newton Mckeon MD, Yanna Vasquez MD, Neal Li MD, Liliana Turner MD, Luis Parkinson MD, Bishop Tariq MD, Amanuel Garcia DO, Antoine Conrad DO, Jesus Correa MD,  Pj Solomon MD, Shirley Waterhouse, CNP,  Katherin Bruno CNP, Robby Gallardo, CNP,  Adriana Foy, DNP, Shelley Buck, CNP, Giulia Sunshine, CNP, Merline Person CNP, Maura Viera, CNP, Whitney Albarran, PA-C, Roxy Mcclelland PA-C, Jeanne Hernandez, CNP, Madeline Carreon, CNP, Vega Young, CNP, Sophia Malone, CNP, Yane Roberts, CNP, Inge Mathias, CNS, Katherin Nazario, CNP, Bobbi Fernandes CNP, Tracy Schwab, CNP         [facility]   IN-PATIENT SERVICE   [facility]    Progress Note    5/22/2024    8:59 AM    Name:   Stevo Avilez  MRN:     [id number]     Acct:      [id number]   Room:   2010/2010-01  IP Day:  7  Admit Date:  5/15/2024  9:14 AM    PCP:   Michael Godinez APRN - CNP  Code Status:  Full Code    Subjective:     C/C:   Chief Complaint   Patient presents with    Fatigue     Interval History Status: improved.     Patient is resting in bed. He is currently eating lunch.  Has has worked with PT/OT and sat at the edge of bed. He denies any SOB or pain.  BP creeping up. 150-180 systolic    Brief History:     Per my partner:  \"This is a 53-year-old male with a significant past medical history of hypertension, paroxysmal atrial fibrillation, CKD stage IIIb and obesity who initially presented emergency department with inability to ambulate and frequent falls  **OR** acetaminophen    Data:     Past Medical History:   has a past medical history of Bipolar 1 disorder (HCC) and Hypertension.    Social History:   reports that he has never smoked. He has never used smokeless tobacco. He reports current alcohol use. He reports that he does not use drugs.     Family History: No family history on file.    Vitals:  BP (!) 180/124   Pulse 70   Temp 98.6 °F (37 °C) (Oral)   Resp 14   Ht 1.854 m (6' 1\")   Wt (!) 218.8 kg (482 lb 5.9 oz)   SpO2 97%   BMI 63.64 kg/m²   Temp (24hrs), Av.6 °F (37 °C), Min:98.3 °F (36.8 °C), Max:98.7 °F (37.1 °C)    No results for input(s): \"POCGLU\" in the last 72 hours.    I/O (24Hr):    Intake/Output Summary (Last 24 hours) at 2024 0859  Last data filed at 2024 0813  Gross per 24 hour   Intake --   Output 1850 ml   Net -1850 ml       Labs:  Hematology:  Recent Labs     24  0554 24  0644   WBC 6.8 7.6   RBC 3.44* 3.68*   HGB 10.2* 10.8*   HCT 33.4* 38.0*   MCV 97.1 103.3*   MCH 29.7 29.3   MCHC 30.5 28.4   RDW 14.5* 14.3    193   MPV 9.8 9.1     Chemistry:  Recent Labs     24  0554 24  0644 24  0624    139 139   K 4.4 4.5 4.3   * 106 105   CO2  24   GLUCOSE 114* 96 114*   BUN 35* 32* 29*   CREATININE 2.3* 2.2* 2.1*   MG 2.1 2.2  --    ANIONGAP 8* 11 10   LABGLOM 34* 36* 37*   CALCIUM 8.5* 8.6 8.3*   No results for input(s): \"PROT\", \"LABALBU\", \"LABA1C\", \"T8SBZLA\", \"Y6AGNOS\", \"FT4\", \"TSH\", \"AST\", \"ALT\", \"LDH\", \"GGT\", \"ALKPHOS\", \"BILITOT\", \"BILIDIR\", \"AMMONIA\", \"AMYLASE\", \"LIPASE\", \"LACTATE\", \"CHOL\", \"HDL\", \"CHOLHDLRATIO\", \"TRIG\", \"VLDL\", \"ICC50CC\", \"PHENYTOIN\", \"PHENYF\", \"URICACID\", \"POCGLU\" in the last 72 hours.    Invalid input(s): \"LABGGT\", \"LDLCHOLESTEROL\"  ABG:No results found for: \"POCPH\", \"PHART\", \"PH\", \"POCPCO2\", \"PJZ6ZVX\", \"PCO2\", \"POCPO2\", \"PO2ART\", \"PO2\", \"POCHCO3\", \"WLT8VMX\", \"HCO3\", \"NBEA\", \"PBEA\", \"BEART\", \"BE\", \"THGBART\", \"THB\", \"IKX1OGT\", \"AFEZ0QUE\", \"Y2VLLALV\",  wraps  JONNA on CKD 3- likely from overdiuresis in addition to Lisinopril, Cr improved with IV hydration, Cr 2.1 near baseline, Nephrology restarted Demadex  HTN- BP elevated,  Coreg 37.5mg BID increased, Hydralazine, Clonidine, Aldactone, Lisinopril on hold, Demadex restarted  P A.fib- HR controlled on Coreg, con't Eliquis  Bipolar d/o- con't aripiprazole  MO- encourage diet and weight loss  PT/OT  DVT proph- on Eliquis  Awaiting SNF placement, okay to discharge when arrangements made    Mili Rangel MD  5/22/2024  8:59 AM

## 2024-05-22 NOTE — PROGRESS NOTES
Nutrition Assessment     Type and Reason for Visit: RD Nutrition Re-Screen/LOS    Nutrition Recommendations/Plan:   Continue diet as ordered.  Monitor intakes, need for ONS, weight, labs, GI status, fluid status.     Malnutrition Assessment:  Malnutrition Status: Insufficient data    Nutrition Assessment:  s/p fall Adm JONNA on CKD, cellulitis of RLE. Hx CKD 3, HTN, prediabetes. Chart review for LOS. % of meals. LBM 5/20. Per chart review, no weight loss indicated. Noted +4 BLE edema. Awaiting acceptance.    Nutrition Related Findings:   Meds/labs reviewed Wound Type: Venous Stasis    Current Nutrition Therapies:    ADULT DIET; Regular    Anthropometric Measures:  Height: 185.4 cm (6' 0.99\")  Current Body Wt: 218.8 kg (482 lb 5.9 oz)   BMI: 63.7    Nutrition Diagnosis:   No nutrition diagnosis at this time     Nutrition Interventions:   Food and/or Nutrient Delivery: Continue Current Diet  Nutrition Education/Counseling: No recommendation at this time  Coordination of Nutrition Care: Continue to monitor while inpatient    Goals:  Previous Goal Met:  (goal set)    Nutrition Monitoring and Evaluation:   Behavioral-Environmental Outcomes: None Identified  Food/Nutrient Intake Outcomes: None Identified  Physical Signs/Symptoms Outcomes: None Identified    Discharge Planning:    Recommend pursue outpatient nutrition counseling     Stacey Morrison RD  Contact: [phone]

## 2024-05-22 NOTE — PLAN OF CARE
Problem: Discharge Planning  Goal: Discharge to home or other facility with appropriate resources  Outcome: Progressing  Flowsheets (Taken 5/22/2024 0800)  Discharge to home or other facility with appropriate resources: Identify barriers to discharge with patient and caregiver     Problem: Pain  Goal: Verbalizes/displays adequate comfort level or baseline comfort level  Outcome: Progressing     Problem: Skin/Tissue Integrity  Goal: Absence of new skin breakdown  Description: 1.  Monitor for areas of redness and/or skin breakdown  2.  Assess vascular access sites hourly  3.  Every 4-6 hours minimum:  Change oxygen saturation probe site  4.  Every 4-6 hours:  If on nasal continuous positive airway pressure, respiratory therapy assess nares and determine need for appliance change or resting period.  Outcome: Progressing     Problem: Safety - Adult  Goal: Free from fall injury  Outcome: Progressing

## 2024-05-22 NOTE — PROGRESS NOTES
[facility] -   Infectious diseases evaluation  admission date 5/15/2024    reason for consultation:   RLE cellultis    Impression :   Current:  Bilateral lower extremity lymphedema  Right lower extremity cellulitis  Cellulitis R thigh lymphedema panus  JONNA on CKD  Paroxysmal A-fib    Other:    Discussion / summary of stay / plan of care/ Recommendations:     HENCE:   RLE and right thigh redness and edema are extensive - will take a long time to show resolution despite the correct AB    On ancef 5/15- upon DC , switch to ceftriaxone 2 g daily till 6/5  Therapy lengthened due to expected lengthy time of recovery of the edematous area of the R thigh panus   - 149 - 80 better  5/17 Cellulitis clinically better - reconsiled w picc and ceftriaxone for DC  Compression RLE    Infection Control Recommendations   [place] Precautions  Contact Isolation       Antimicrobial Stewardship Recommendations   Simplification of therapy  Targeted therapy    History of Present Illness:   Initial history:  Stevo Avilez is a 53 y.o.-year-old male with a history of lymphedema obesity A-fib comes in because of frequent falls and inability to walk lower extremity lymphedema has gotten worse, creatinine 3.5 with a baseline of 1.8  Was noticed to have a right lower extremity cellulitis and admitted to the hospital, infectious consulted    He is on ancef        Interval changes  5/21/2024   Patient Vitals for the past 8 hrs:   BP Temp Temp src Pulse Resp   05/21/24 1935 (!) 188/101 98.6 °F (37 °C) Oral 77 17   05/21/24 1800 -- -- -- 75 18   05/21/24 1700 -- -- -- 70 15   05/21/24 1638 (!) 153/93 98.6 °F (37 °C) Oral 72 21   05/21/24 1600 -- -- -- 74 16   05/21/24 1500 -- -- -- 70 14       5/17  Cellulitis better - CRP better - sluggish likely from pain meds - no fever    5/18  Left panus much better less red and still very swollen and tender -  Ox 3 and abd soft non tender  WBC

## 2024-05-22 NOTE — CARE COORDINATION
Spoke to Odette from [facility]. The  from [facility], Juan C, is planning a bedside visit today before making a determination on acceptance    1539 Juan C from [facility] at bedside. He will discuss tomorrow at morning meeting and give answer tomorrow    1607 received call from Odette from [facility]. They are not able to accommodate PICC line. She will discuss with nursing and notify tomorrow morning what kind of IV access they can accommodate

## 2024-05-22 NOTE — PROGRESS NOTES
Nephrology Progress Note      SUBJECTIVE      Patient seen and examined this morning.  He is laying in bed, appears comfortable.  He does not have any fever.  Continues to have decent urinary output.  Labs reviewed and his serum creatinine is now down to 2.1.  His potassium is 4.3 and his sodium is 139.  He did have a fair bit of urinary output as he was recovering from his ATN.  Patient has a history of biopsy-proven IgA nephropathy and stage IV CKD followed by Dr. Pozo out of [facility].    His JONNA seems to have resolved.  Blood pressures running a little on the high side.    OBJECTIVE      CURRENT TEMPERATURE:  Temp: 98.6 °F (37 °C)  MAXIMUM TEMPERATURE OVER 24HRS:  Temp (24hrs), Av.6 °F (37 °C), Min:98.3 °F (36.8 °C), Max:98.7 °F (37.1 °C)    CURRENT RESPIRATORY RATE:  Respirations: 14  CURRENT PULSE:  Pulse: 70  CURRENT BLOOD PRESSURE:  BP: (!) 180/124  24HR BLOOD PRESSURE RANGE:  Systolic (24hrs), Av , Min:153 , Max:188   ; Diastolic (24hrs), Av, Min:90, Max:124    24HR INTAKE/OUTPUT:    Intake/Output Summary (Last 24 hours) at 2024 0940  Last data filed at 2024 0813  Gross per 24 hour   Intake --   Output 1850 ml   Net -1850 ml     WEIGHT :No data found.  PHYSICAL EXAM      GENERAL APPEARANCE: Patient is in bed, appears comfortable in no acute distress  SKIN: warm and dry, no rash or erythema  EYES: conjunctivae normal and sclera anicteric  ENT: no thrush no pharyngeal congestion   NECK:   No JVD. No carotid bruits and no carotid lymphadenopathy .  PULMONARY: Diminished breath sounds on auscultation i .   CADRDIOVASCULAR: S1 and S2 normal NO S3 and NO S4 . No rubs , no murmur.   ABDOMEN: soft nontender, bowel sounds present, no organomegaly, no ascites.     EXTREMITIES: + edema     CURRENT MEDICATIONS      spironolactone (ALDACTONE) tablet 25 mg, Daily  NIFEdipine (ADALAT CC) extended release tablet 60 mg, Daily  labetalol (NORMODYNE;TRANDATE) injection 10 mg, Q6H PRN  hydrALAZINE  (APRESOLINE) tablet 100 mg, 3 times per day  cloNIDine (CATAPRES) 0.3 MG/24HR 1 patch, Weekly  oxyCODONE (ROXICODONE) immediate release tablet 5 mg, Q4H PRN  apixaban (ELIQUIS) tablet 2.5 mg, BID  ARIPiprazole (ABILIFY) tablet 15 mg, Daily  carvedilol (COREG) tablet 25 mg, BID WC  sodium chloride flush 0.9 % injection 5-40 mL, 2 times per day  sodium chloride flush 0.9 % injection 5-40 mL, PRN  0.9 % sodium chloride infusion, PRN  potassium chloride (KLOR-CON M) extended release tablet 40 mEq, PRN   Or  potassium bicarb-citric acid (EFFER-K) effervescent tablet 40 mEq, PRN   Or  potassium chloride 10 mEq/100 mL IVPB (Peripheral Line), PRN  magnesium sulfate 2000 mg in 50 mL IVPB premix, PRN  ondansetron (ZOFRAN-ODT) disintegrating tablet 4 mg, Q8H PRN   Or  ondansetron (ZOFRAN) injection 4 mg, Q6H PRN  polyethylene glycol (GLYCOLAX) packet 17 g, Daily PRN  acetaminophen (TYLENOL) tablet 650 mg, Q6H PRN   Or  acetaminophen (TYLENOL) suppository 650 mg, Q6H PRN  ceFAZolin (ANCEF) 2000 mg in sterile water 20 mL IV syringe, Q8H          LABS      CBC:   Recent Labs     05/20/24  0554 05/21/24  0644   WBC 6.8 7.6   RBC 3.44* 3.68*   HGB 10.2* 10.8*   HCT 33.4* 38.0*   MCV 97.1 103.3*   MCH 29.7 29.3   MCHC 30.5 28.4   RDW 14.5* 14.3    193   MPV 9.8 9.1      BMP:   Recent Labs     05/20/24  0554 05/21/24  0644 05/22/24  0624    139 139   K 4.4 4.5 4.3   * 106 105   CO2 24 22 24   BUN 35* 32* 29*   CREATININE 2.3* 2.2* 2.1*   GLUCOSE 114* 96 114*   CALCIUM 8.5* 8.6 8.3*      MAGNESIUM:   Recent Labs     05/20/24  0554 05/21/24  0644   MG 2.1 2.2     SPEP:   Lab Results   Component Value Date/Time    ALBCAL 3.8 10/30/2017 04:51 AM    ALBPCT 63 10/30/2017 04:51 AM    LABALPH 0.2 10/30/2017 04:51 AM    LABALPH 0.5 10/30/2017 04:51 AM    A1PCT 3 10/30/2017 04:51 AM    A2PCT 9 10/30/2017 04:51 AM    BETAPCT 11 10/30/2017 04:51 AM    GAMGLOB 0.9 10/30/2017 04:51 AM    GGPCT 15 10/30/2017 04:51 AM    PATH

## 2024-05-23 PROCEDURE — 99232 SBSQ HOSP IP/OBS MODERATE 35: CPT | Performed by: INTERNAL MEDICINE

## 2024-05-23 PROCEDURE — 6370000000 HC RX 637 (ALT 250 FOR IP): Performed by: INTERNAL MEDICINE

## 2024-05-23 PROCEDURE — 94760 N-INVAS EAR/PLS OXIMETRY 1: CPT

## 2024-05-23 PROCEDURE — 6360000002 HC RX W HCPCS: Performed by: STUDENT IN AN ORGANIZED HEALTH CARE EDUCATION/TRAINING PROGRAM

## 2024-05-23 PROCEDURE — 2580000003 HC RX 258: Performed by: STUDENT IN AN ORGANIZED HEALTH CARE EDUCATION/TRAINING PROGRAM

## 2024-05-23 PROCEDURE — 6370000000 HC RX 637 (ALT 250 FOR IP): Performed by: STUDENT IN AN ORGANIZED HEALTH CARE EDUCATION/TRAINING PROGRAM

## 2024-05-23 PROCEDURE — 2500000003 HC RX 250 WO HCPCS: Performed by: INTERNAL MEDICINE

## 2024-05-23 PROCEDURE — 2060000000 HC ICU INTERMEDIATE R&B

## 2024-05-23 RX ORDER — CARVEDILOL 12.5 MG/1
37.5 TABLET ORAL 2 TIMES DAILY WITH MEALS
Qty: 60 TABLET | Refills: 3 | DISCHARGE
Start: 2024-05-23

## 2024-05-23 RX ORDER — GABAPENTIN 100 MG/1
200 CAPSULE ORAL 2 TIMES DAILY
Status: DISCONTINUED | OUTPATIENT
Start: 2024-05-23 | End: 2024-05-25 | Stop reason: HOSPADM

## 2024-05-23 RX ORDER — ARIPIPRAZOLE 15 MG/1
15 TABLET ORAL DAILY
Qty: 30 TABLET | Refills: 3 | DISCHARGE
Start: 2024-05-24

## 2024-05-23 RX ORDER — POLYETHYLENE GLYCOL 3350 17 G/17G
17 POWDER, FOR SOLUTION ORAL DAILY PRN
Qty: 350 G | Refills: 0 | DISCHARGE
Start: 2024-05-23 | End: 2024-06-22

## 2024-05-23 RX ADMIN — Medication 2000 MG: at 13:39

## 2024-05-23 RX ADMIN — HYDRALAZINE HYDROCHLORIDE 100 MG: 50 TABLET ORAL at 06:42

## 2024-05-23 RX ADMIN — CARVEDILOL 37.5 MG: 25 TABLET, FILM COATED ORAL at 16:39

## 2024-05-23 RX ADMIN — HYDRALAZINE HYDROCHLORIDE 100 MG: 50 TABLET ORAL at 20:56

## 2024-05-23 RX ADMIN — SODIUM CHLORIDE, PRESERVATIVE FREE 10 ML: 5 INJECTION INTRAVENOUS at 09:28

## 2024-05-23 RX ADMIN — Medication 10 MG: at 04:04

## 2024-05-23 RX ADMIN — NIFEDIPINE 60 MG: 60 TABLET, EXTENDED RELEASE ORAL at 09:27

## 2024-05-23 RX ADMIN — Medication 2000 MG: at 20:56

## 2024-05-23 RX ADMIN — ARIPIPRAZOLE 15 MG: 15 TABLET ORAL at 09:27

## 2024-05-23 RX ADMIN — APIXABAN 2.5 MG: 5 TABLET, FILM COATED ORAL at 20:56

## 2024-05-23 RX ADMIN — HYDRALAZINE HYDROCHLORIDE 100 MG: 50 TABLET ORAL at 13:39

## 2024-05-23 RX ADMIN — TORSEMIDE 10 MG: 20 TABLET ORAL at 09:28

## 2024-05-23 RX ADMIN — SPIRONOLACTONE 25 MG: 25 TABLET, FILM COATED ORAL at 09:28

## 2024-05-23 RX ADMIN — GABAPENTIN 200 MG: 100 CAPSULE ORAL at 20:56

## 2024-05-23 RX ADMIN — OXYCODONE 5 MG: 5 TABLET ORAL at 03:19

## 2024-05-23 RX ADMIN — CARVEDILOL 37.5 MG: 25 TABLET, FILM COATED ORAL at 09:28

## 2024-05-23 RX ADMIN — APIXABAN 2.5 MG: 5 TABLET, FILM COATED ORAL at 09:27

## 2024-05-23 RX ADMIN — Medication 2000 MG: at 05:26

## 2024-05-23 RX ADMIN — SODIUM CHLORIDE, PRESERVATIVE FREE 10 ML: 5 INJECTION INTRAVENOUS at 20:57

## 2024-05-23 RX ADMIN — ANTI-FUNGAL POWDER MICONAZOLE NITRATE TALC FREE: 1.42 POWDER TOPICAL at 16:39

## 2024-05-23 ASSESSMENT — ENCOUNTER SYMPTOMS
EYE DISCHARGE: 0
EYE PAIN: 0
APNEA: 0
ABDOMINAL DISTENTION: 0
PHOTOPHOBIA: 0
CHOKING: 0
EYE REDNESS: 0
COLOR CHANGE: 1
COUGH: 0

## 2024-05-23 ASSESSMENT — PAIN SCALES - GENERAL
PAINLEVEL_OUTOF10: 10
PAINLEVEL_OUTOF10: 4

## 2024-05-23 ASSESSMENT — PAIN DESCRIPTION - LOCATION: LOCATION: PERINEUM

## 2024-05-23 ASSESSMENT — PAIN DESCRIPTION - DESCRIPTORS: DESCRIPTORS: SPASM

## 2024-05-23 ASSESSMENT — PAIN DESCRIPTION - ORIENTATION: ORIENTATION: MID

## 2024-05-23 NOTE — PROGRESS NOTES
Comprehensive Nutrition Assessment    Type and Reason for Visit:  Reassess    Nutrition Recommendations/Plan:   Monitor labs, intake, and wt      Malnutrition Assessment:  Malnutrition Status:  No malnutrition (05/23/24 1450)    Context:  Chronic Illness     Findings of the 6 clinical characteristics of malnutrition:  Energy Intake:  No significant decrease in energy intake  Weight Loss:  No significant weight loss     Body Fat Loss:  No significant body fat loss     Muscle Mass Loss:  No significant muscle mass loss    Fluid Accumulation:  Mild     Strength:  Not Performed    Nutrition Assessment:    Pt seen for reassessment. Pt adm for management of his cellulitis as well as JONNA on CKD. Per chart - cellulitis, edema in LE. PO Intake is 90% per pt. No decrease in appetite or nutrition intake. No nausea or vomiting. LBM 5/22. No wt loss per pt.    Nutrition Related Findings:    Meds/labs reviewed Wound Type: None       Current Nutrition Intake & Therapies:    Average Meal Intake: %  Average Supplements Intake: None Ordered  ADULT DIET; Regular    Anthropometric Measures:  Height: 185.4 cm (6' 0.99\")  Ideal Body Weight (IBW): 184 lbs (84 kg)       Current Body Weight: 218.8 kg (482 lb 5.9 oz), 262.2 % IBW.    Current BMI (kg/m2): 63.7  Usual Body Weight: 191.9 kg (423 lb) (10/18/23)  % Weight Change (Calculated): 14     BMI Categories: Obese Class 3 (BMI 40.0 or greater)    Estimated Daily Nutrient Needs:  Energy Requirements Based On: Kcal/kg  Weight Used for Energy Requirements: Ideal  Energy (kcal/day): 7519-1462 kcal/day  Weight Used for Protein Requirements: Ideal  Protein (g/day):  g pro/day     Fluid (ml/day): per MD    Nutrition Diagnosis:   No nutrition diagnosis at this time     Nutrition Interventions:   Food and/or Nutrient Delivery: Continue Current Diet  Nutrition Education/Counseling: No recommendation at this time  Coordination of Nutrition Care: No recommendation at this time  Plan of

## 2024-05-23 NOTE — PROGRESS NOTES
PICC RN called writer to inform they are not able to place a midline. Recently placed PICC and pt was a difficult stick     Case management updated

## 2024-05-23 NOTE — PROGRESS NOTES
Pt refused multiple times through out the day to turn/change position. Pt educated on the need to move to prevent bedsores, pt stated he understands but does not wish to move at this time.

## 2024-05-23 NOTE — PROGRESS NOTES
[facility] -   Infectious diseases evaluation  admission date 5/15/2024    reason for consultation:   RLE cellultis    Impression :   Current:  Bilateral lower extremity lymphedema  Right lower extremity cellulitis  Cellulitis R thigh lymphedema panus  JONNA on CKD  Paroxysmal A-fib    Other:    Discussion / summary of stay / plan of care/ Recommendations:     HENCE:   RLE and right thigh redness and edema are extensive - will take a long time to show resolution despite the correct AB    On ancef 5/15- upon DC , switch to ceftriaxone 2 g daily till 6/5  Therapy lengthened due to expected lengthy time of recovery of the edematous area of the R thigh panus   - 149 - 80 better  5/17 Cellulitis clinically better - reconsiled w picc and ceftriaxone for DC  Compression RLE    Infection Control Recommendations   [place] Precautions  Contact Isolation       Antimicrobial Stewardship Recommendations   Simplification of therapy  Targeted therapy    History of Present Illness:   Initial history:  Stevo Avilez is a 53 y.o.-year-old male with a history of lymphedema obesity A-fib comes in because of frequent falls and inability to walk lower extremity lymphedema has gotten worse, creatinine 3.5 with a baseline of 1.8  Was noticed to have a right lower extremity cellulitis and admitted to the hospital, infectious consulted    He is on ancef        Interval changes  5/22/2024   Patient Vitals for the past 8 hrs:   BP Temp Temp src Pulse Resp SpO2 Height   05/22/24 1700 -- -- -- 72 -- -- --   05/22/24 1641 (!) 155/86 97.9 °F (36.6 °C) Oral 64 19 94 % --   05/22/24 1600 -- -- -- 65 -- -- --   05/22/24 1546 -- -- -- -- -- -- 1.854 m (6' 0.99\")   05/22/24 1500 -- -- -- 58 -- -- --       5/17  Cellulitis better - CRP better - sluggish likely from pain meds - no fever    5/18  Left panus much better less red and still very swollen and tender -  Ox 3 and abd soft non

## 2024-05-23 NOTE — PROGRESS NOTES
[facility]  Office: [phone]  Phillip Pierson DO, Brendon Barney, DO, Efra Aly DO, Dinh Garcia, DO, Ariel Snyder MD, Mili Rangel MD, Emily Juarez MD, Brandie Erazo MD,  Nikolas Obrien MD, Layton Mejia MD, Andrew Forbes MD,  Nicole Lai DO, Penny Arriaga MD, Doc Fatima MD, Massimo Pierson DO, Yamilet Harper MD,  Dameon Pineda DO, Brittany Zayas MD, Geneva Snowden MD, Keely Manzo MD, Ryan Kline MD,  Newton Mckeon MD, Yanna Vasquez MD, Neal Li MD, Liliana Turner MD, Luis Parkinson MD, Bishop Tariq MD, Amanuel Garcia DO, Antoine Conrad DO, Jesus Correa MD,  Pj Solomon MD, Shirley Waterhouse, CNP,  Katherin Bruno CNP, Robby Gallardo, CNP,  Adriana Foy, DNP, Shelley Buck, CNP, Giulia Sunshine, CNP, Merline Person CNP, Maura Viera, CNP, Whitney Albarran, PA-C, Roxy Mcclelland PA-C, Jeanne Hernandez, CNP, Madeline Carreon, CNP, Vega Young, CNP, Sophia Malone, CNP, Yane Roberts, CNP, Inge Mathias, CNS, Katherin Nazario, CNP, Bobbi Fernandes CNP, Tracy Schwab, CNP         [facility]   IN-PATIENT SERVICE   [facility]    Progress Note    5/23/2024    8:58 AM    Name:   Stevo Avilez  MRN:     [id number]     Acct:      [id number]   Room:   2010/2010-01  IP Day:  8  Admit Date:  5/15/2024  9:14 AM    PCP:   Michael Godinez APRN - CNP  Code Status:  Full Code    Subjective:     C/C:   Chief Complaint   Patient presents with    Fatigue     Interval History Status: improved.     Patient is resting in bed. He is c/o bilat knee pain.   He denies any SOB.  He hasn't done any PT/OT today due to the pain.     Brief History:     Per my partner:  \"This is a 53-year-old male with a significant past medical history of hypertension, paroxysmal atrial fibrillation, CKD stage IIIb and obesity who initially presented emergency department with inability to ambulate and frequent falls secondary to his lymphedema.  In the emergency

## 2024-05-23 NOTE — PROGRESS NOTES
[facility] -   Infectious diseases evaluation  admission date 5/15/2024    reason for consultation:   RLE cellultis    Impression :   Current:  Bilateral lower extremity lymphedema  Right lower extremity cellulitis  Cellulitis R thigh lymphedema panus  JONNA on CKD  Paroxysmal A-fib    Other:    Discussion / summary of stay / plan of care/ Recommendations:     HENCE:   RLE and right thigh redness and edema are extensive - will take a long time to show resolution despite the correct AB    On ancef 5/15- upon DC , switch to ceftriaxone 2 g daily till 6/5  Therapy lengthened due to expected lengthy time of recovery of the edematous area of the R thigh panus   - 149 - 80 better  5/17 Cellulitis clinically better - reconsiled w picc and ceftriaxone for DC  Compression RLE    Infection Control Recommendations   [place] Precautions  Contact Isolation       Antimicrobial Stewardship Recommendations   Simplification of therapy  Targeted therapy    History of Present Illness:   Initial history:  Stevo Avilez is a 53 y.o.-year-old male with a history of lymphedema obesity A-fib comes in because of frequent falls and inability to walk lower extremity lymphedema has gotten worse, creatinine 3.5 with a baseline of 1.8  Was noticed to have a right lower extremity cellulitis and admitted to the hospital, infectious consulted    He is on ancef        Interval changes  5/23/2024   Patient Vitals for the past 8 hrs:   BP Temp Temp src Pulse Resp SpO2   05/23/24 0740 (!) 165/92 -- -- 58 21 --   05/23/24 0359 (!) 173/94 98.4 °F (36.9 °C) Oral 66 20 97 %   05/23/24 0349 -- -- -- -- 18 --   05/23/24 0319 -- -- -- -- 20 --       5/17  Cellulitis better - CRP better - sluggish likely from pain meds - no fever    5/18  Left panus much better less red and still very swollen and tender -  Ox 3 and abd soft non tender  WBC normal  No + cx    5/19  Cellulitis R LE much better -  No congestion.      Mouth/Throat:      Mouth: Mucous membranes are moist.   Eyes:      General: No scleral icterus.     Conjunctiva/sclera: Conjunctivae normal.   Cardiovascular:      Rate and Rhythm: Normal rate and regular rhythm.      Heart sounds: No murmur heard.     No friction rub. No gallop.   Pulmonary:      Effort: No respiratory distress.      Breath sounds: No stridor. No wheezing, rhonchi or rales.   Chest:      Chest wall: No tenderness.   Abdominal:      General: There is no distension.      Palpations: There is no mass.      Tenderness: There is no abdominal tenderness. There is no guarding or rebound.      Hernia: No hernia is present.   Musculoskeletal:         General: No swelling, tenderness or deformity.      Cervical back: Neck supple. No rigidity or tenderness.      Right lower leg: Edema present.      Left lower leg: Edema present.   Skin:     Coloration: Skin is not jaundiced.      Findings: Erythema (red RLE) present. No bruising.   Neurological:      Mental Status: He is alert and oriented to person, place, and time.      Cranial Nerves: No cranial nerve deficit.   Psychiatric:         Mood and Affect: Mood normal.         Thought Content: Thought content normal.         Past Medical History:     Past Medical History:   Diagnosis Date    Bipolar 1 disorder (HCC)     Hypertension        Past Surgical  History:   No past surgical history on file.    Medications:      torsemide  10 mg Oral Daily    carvedilol  37.5 mg Oral BID WC    spironolactone  25 mg Oral Daily    NIFEdipine  60 mg Oral Daily    hydrALAZINE  100 mg Oral 3 times per day    cloNIDine  1 patch TransDERmal Weekly    apixaban  2.5 mg Oral BID    ARIPiprazole  15 mg Oral Daily    sodium chloride flush  5-40 mL IntraVENous 2 times per day    ceFAZolin  2,000 mg IntraVENous Q8H       Social History:     Social History     Socioeconomic History    Marital status:      Spouse name: Not on file    Number of children: Not on  like substitutions which may escape proof reading.  It such instances, actual meaningcan be extrapolated by contextual diversion.    Padma Pozo MD  Office: [phone]  Perfect serve / office [phone]

## 2024-05-23 NOTE — PLAN OF CARE
Problem: Discharge Planning  Goal: Discharge to home or other facility with appropriate resources  Outcome: Progressing  Flowsheets (Taken 5/22/2024 1900)  Discharge to home or other facility with appropriate resources:   Identify barriers to discharge with patient and caregiver   Arrange for needed discharge resources and transportation as appropriate   Identify discharge learning needs (meds, wound care, etc)     Problem: Pain  Goal: Verbalizes/displays adequate comfort level or baseline comfort level  Outcome: Progressing     Problem: Skin/Tissue Integrity  Goal: Absence of new skin breakdown  Description: 1.  Monitor for areas of redness and/or skin breakdown  2.  Assess vascular access sites hourly  3.  Every 4-6 hours minimum:  Change oxygen saturation probe site  4.  Every 4-6 hours:  If on nasal continuous positive airway pressure, respiratory therapy assess nares and determine need for appliance change or resting period.  Outcome: Progressing     Problem: Safety - Adult  Goal: Free from fall injury  Outcome: Progressing

## 2024-05-23 NOTE — CARE COORDINATION
Transitional Planning  Called [facility] spoke with Josette [phone] she states they can accept pt if picc line is changed to midline.  She would like to be called right away then she can start precert  10:09am  PS Dr Ruiz await response    Called [facility] let them know Dr ruiz ok with changing Picc line to midline. They will start precert      Picc team unable to change Picc line to midline  Called [facility] left voicemail for Josette that pt will have picc line and will need her to cancel precert and they cannot accept pt with picc line    5\"15pm  Called Chris Chavez left  for admissions about review and start precert on.  Did not leave pt information as I was not sure it was a confidential line. Await call back

## 2024-05-24 PROCEDURE — 97530 THERAPEUTIC ACTIVITIES: CPT

## 2024-05-24 PROCEDURE — 2500000003 HC RX 250 WO HCPCS: Performed by: INTERNAL MEDICINE

## 2024-05-24 PROCEDURE — 6360000002 HC RX W HCPCS: Performed by: STUDENT IN AN ORGANIZED HEALTH CARE EDUCATION/TRAINING PROGRAM

## 2024-05-24 PROCEDURE — 6370000000 HC RX 637 (ALT 250 FOR IP): Performed by: STUDENT IN AN ORGANIZED HEALTH CARE EDUCATION/TRAINING PROGRAM

## 2024-05-24 PROCEDURE — 2580000003 HC RX 258: Performed by: STUDENT IN AN ORGANIZED HEALTH CARE EDUCATION/TRAINING PROGRAM

## 2024-05-24 PROCEDURE — 97535 SELF CARE MNGMENT TRAINING: CPT

## 2024-05-24 PROCEDURE — 6370000000 HC RX 637 (ALT 250 FOR IP): Performed by: INTERNAL MEDICINE

## 2024-05-24 PROCEDURE — 97110 THERAPEUTIC EXERCISES: CPT

## 2024-05-24 PROCEDURE — 99231 SBSQ HOSP IP/OBS SF/LOW 25: CPT | Performed by: INTERNAL MEDICINE

## 2024-05-24 PROCEDURE — 99232 SBSQ HOSP IP/OBS MODERATE 35: CPT | Performed by: INTERNAL MEDICINE

## 2024-05-24 PROCEDURE — 2060000000 HC ICU INTERMEDIATE R&B

## 2024-05-24 RX ORDER — SPIRONOLACTONE 25 MG/1
25 TABLET ORAL DAILY
Status: DISCONTINUED | OUTPATIENT
Start: 2024-05-25 | End: 2024-05-25 | Stop reason: HOSPADM

## 2024-05-24 RX ORDER — SPIRONOLACTONE 25 MG/1
50 TABLET ORAL DAILY
Status: DISCONTINUED | OUTPATIENT
Start: 2024-05-25 | End: 2024-05-24

## 2024-05-24 RX ORDER — CARVEDILOL 25 MG/1
25 TABLET ORAL 2 TIMES DAILY WITH MEALS
Status: DISCONTINUED | OUTPATIENT
Start: 2024-05-24 | End: 2024-05-25 | Stop reason: HOSPADM

## 2024-05-24 RX ORDER — DOCUSATE SODIUM 100 MG/1
200 CAPSULE, LIQUID FILLED ORAL DAILY
Status: DISCONTINUED | OUTPATIENT
Start: 2024-05-24 | End: 2024-05-25 | Stop reason: HOSPADM

## 2024-05-24 RX ADMIN — SODIUM CHLORIDE, PRESERVATIVE FREE 10 ML: 5 INJECTION INTRAVENOUS at 09:48

## 2024-05-24 RX ADMIN — Medication 2000 MG: at 06:12

## 2024-05-24 RX ADMIN — HYDRALAZINE HYDROCHLORIDE 100 MG: 50 TABLET ORAL at 12:29

## 2024-05-24 RX ADMIN — CARVEDILOL 25 MG: 25 TABLET, FILM COATED ORAL at 16:04

## 2024-05-24 RX ADMIN — GABAPENTIN 200 MG: 100 CAPSULE ORAL at 09:44

## 2024-05-24 RX ADMIN — ANTI-FUNGAL POWDER MICONAZOLE NITRATE TALC FREE: 1.42 POWDER TOPICAL at 00:21

## 2024-05-24 RX ADMIN — SODIUM CHLORIDE, PRESERVATIVE FREE 10 ML: 5 INJECTION INTRAVENOUS at 20:39

## 2024-05-24 RX ADMIN — HYDRALAZINE HYDROCHLORIDE 100 MG: 50 TABLET ORAL at 06:12

## 2024-05-24 RX ADMIN — APIXABAN 2.5 MG: 5 TABLET, FILM COATED ORAL at 09:47

## 2024-05-24 RX ADMIN — HYDRALAZINE HYDROCHLORIDE 100 MG: 50 TABLET ORAL at 20:36

## 2024-05-24 RX ADMIN — APIXABAN 2.5 MG: 5 TABLET, FILM COATED ORAL at 20:36

## 2024-05-24 RX ADMIN — ACETAMINOPHEN 650 MG: 325 TABLET ORAL at 00:28

## 2024-05-24 RX ADMIN — NIFEDIPINE 60 MG: 60 TABLET, EXTENDED RELEASE ORAL at 09:44

## 2024-05-24 RX ADMIN — ARIPIPRAZOLE 15 MG: 15 TABLET ORAL at 09:47

## 2024-05-24 RX ADMIN — TORSEMIDE 10 MG: 20 TABLET ORAL at 09:44

## 2024-05-24 RX ADMIN — ANTI-FUNGAL POWDER MICONAZOLE NITRATE TALC FREE: 1.42 POWDER TOPICAL at 12:29

## 2024-05-24 RX ADMIN — ACETAMINOPHEN 650 MG: 325 TABLET ORAL at 20:37

## 2024-05-24 RX ADMIN — DOCUSATE SODIUM 200 MG: 100 CAPSULE, LIQUID FILLED ORAL at 16:02

## 2024-05-24 RX ADMIN — Medication 2000 MG: at 20:37

## 2024-05-24 RX ADMIN — Medication 2000 MG: at 12:29

## 2024-05-24 RX ADMIN — GABAPENTIN 200 MG: 100 CAPSULE ORAL at 20:37

## 2024-05-24 RX ADMIN — SPIRONOLACTONE 25 MG: 25 TABLET, FILM COATED ORAL at 09:47

## 2024-05-24 RX ADMIN — OXYCODONE 5 MG: 5 TABLET ORAL at 16:04

## 2024-05-24 ASSESSMENT — PAIN SCALES - GENERAL
PAINLEVEL_OUTOF10: 4
PAINLEVEL_OUTOF10: 0
PAINLEVEL_OUTOF10: 0
PAINLEVEL_OUTOF10: 7
PAINLEVEL_OUTOF10: 1
PAINLEVEL_OUTOF10: 1
PAINLEVEL_OUTOF10: 3

## 2024-05-24 ASSESSMENT — ENCOUNTER SYMPTOMS
EYE PAIN: 0
EYE REDNESS: 0
ABDOMINAL DISTENTION: 0
COLOR CHANGE: 1
CHOKING: 0
APNEA: 0
COUGH: 0
PHOTOPHOBIA: 0
EYE DISCHARGE: 0

## 2024-05-24 ASSESSMENT — PAIN DESCRIPTION - ORIENTATION
ORIENTATION: MID
ORIENTATION: RIGHT;LEFT

## 2024-05-24 ASSESSMENT — PAIN DESCRIPTION - DESCRIPTORS
DESCRIPTORS: BURNING
DESCRIPTORS: BURNING

## 2024-05-24 ASSESSMENT — PAIN DESCRIPTION - LOCATION
LOCATION: GROIN
LOCATION: LEG;GROIN

## 2024-05-24 ASSESSMENT — PAIN - FUNCTIONAL ASSESSMENT
PAIN_FUNCTIONAL_ASSESSMENT: ACTIVITIES ARE NOT PREVENTED
PAIN_FUNCTIONAL_ASSESSMENT: ACTIVITIES ARE NOT PREVENTED

## 2024-05-24 NOTE — PROGRESS NOTES
Physical Therapy  Facility/Department: [facility] 2- STEPD[facility]  Physical Therapy Daily Treatment Note    Name: Stevo Avilez  : 1970  MRN: [id number]  Date of Service: 2024    Discharge Recommendations:  Patient would benefit from continued therapy after discharge   PT Equipment Recommendations  Equipment Needed: No      Patient Diagnosis(es): The primary encounter diagnosis was Fall, initial encounter. A diagnosis of Cellulitis of right lower extremity was also pertinent to this visit.  Past Medical History:  has a past medical history of Bipolar 1 disorder (HCC) and Hypertension.  Past Surgical History:  has no past surgical history on file.    Assessment   Body Structures, Functions, Activity Limitations Requiring Skilled Therapeutic Intervention: Decreased functional mobility ;Decreased strength;Decreased endurance;Decreased balance  Assessment: Pt required modAx2 to ambulate 3 left lateral steps along EOB using bariatric RW.  Pt required modAx2 to stand from EOB to bariatric RW.  Bed mobility Bertin-modAx2.  Pt most limited by significant weakness, also decreased cognition and requiring encouragement.  Recommending continued PT to address deficits as pt is currently unsafe to return to prior living arrangements.  Therapy Prognosis: Good  Activity Tolerance  Activity Tolerance: Patient limited by endurance;Other (comment) (max encouragement t/o session)     Plan   Physical Therapy Plan  General Plan:  (3-5x/week)  Current Treatment Recommendations: Strengthening, Balance training, Functional mobility training, Transfer training, Endurance training, Gait training, Neuromuscular re-education, Home exercise program, Safety education & training, Patient/Caregiver education & training, Equipment evaluation, education, & procurement, Therapeutic activities  Safety Devices  Type of Devices: All fall risk precautions in place, Call light within reach, Left in bed, Patient at risk for falls, Nurse notified, Gait  reps and  Supine Exercises: Ankle Pumps, Gluteal sets, Quad Sets  Reps:  x 10 reps to promote strengthening and endurance for all functional mobility. Written HEP given to pt.      OutComes Score  AM-PAC - Mobility  AM-PAC Basic Mobility - Inpatient   How much help is needed turning from your back to your side while in a flat bed without using bedrails?: A Lot  How much help is needed moving from lying on your back to sitting on the side of a flat bed without using bedrails?: Total  How much help is needed moving to and from a bed to a chair?: Total  How much help is needed standing up from a chair using your arms?: Total  How much help is needed walking in hospital room?: Total  How much help is needed climbing 3-5 steps with a railing?: Total  AM-PAC Inpatient Mobility Raw Score : 7  AM-PAC Inpatient T-Scale Score : 26.42  Mobility Inpatient CMS 0-100% Score: 92.36  Mobility Inpatient CMS G-Code Modifier : CM    Goals  Short Term Goals  Time Frame for Short Term Goals: 14 visits  Short Term Goal 1: Pt will be modA+2 in bed mobility  Short Term Goal 2: Pt will be SBA In SOB sitting balance  Short Term Goal 3: Pt will be modA+2 in STS transfer  Short Term Goal 4: Pt will amb 5' modA+2 w/ RW or LRAD.     Education  Patient Education  Education Given To: Patient  Education Provided: Role of Therapy;Fall Prevention Strategies;Transfer Training;Equipment  Education Provided Comments: importance of mobility. verbal cues t/o session for safety with sequencing and technique for all mobility  Education Method: Demonstration;Verbal  Barriers to Learning: Other (Comment) (encouragement needed)  Education Outcome: Continued education needed;Demonstrated understanding    Co- treatment with OT warranted secondary to decreased patient safety and independence with functional mobility requiring skilled physical assistance of two professionals to simultaneously address individualized discipline goals. PT is addressing standing

## 2024-05-24 NOTE — PROGRESS NOTES
replacement **OR** potassium chloride, magnesium sulfate, ondansetron **OR** ondansetron, polyethylene glycol, acetaminophen **OR** acetaminophen    Data:     Past Medical History:   has a past medical history of Bipolar 1 disorder (HCC) and Hypertension.    Social History:   reports that he has never smoked. He has never used smokeless tobacco. He reports current alcohol use. He reports that he does not use drugs.     Family History: No family history on file.    Vitals:  BP (!) 153/101   Pulse 69   Temp 97.5 °F (36.4 °C) (Axillary)   Resp 20   Ht 1.854 m (6' 0.99\")   Wt (!) 218.8 kg (482 lb 5.9 oz)   SpO2 96%   BMI 63.65 kg/m²   Temp (24hrs), Av.9 °F (36.6 °C), Min:97.5 °F (36.4 °C), Max:98.2 °F (36.8 °C)    No results for input(s): \"POCGLU\" in the last 72 hours.    I/O (24Hr):    Intake/Output Summary (Last 24 hours) at 2024 0819  Last data filed at 2024 0711  Gross per 24 hour   Intake 800 ml   Output 4950 ml   Net -4150 ml       Labs:  Hematology:  No results for input(s): \"WBC\", \"RBC\", \"HGB\", \"HCT\", \"MCV\", \"MCH\", \"MCHC\", \"RDW\", \"PLT\", \"MPV\", \"SEDRATE\", \"CRP\", \"INR\", \"DDIMER\", \"JW6FNJPK\", \"LABABSO\" in the last 72 hours.    Invalid input(s): \"PT\"    Chemistry:  Recent Labs     24  0624      K 4.3      CO2 24   GLUCOSE 114*   BUN 29*   CREATININE 2.1*   ANIONGAP 10   LABGLOM 37*   CALCIUM 8.3*   No results for input(s): \"PROT\", \"LABALBU\", \"LABA1C\", \"H8OMMZG\", \"W2WUDHH\", \"FT4\", \"TSH\", \"AST\", \"ALT\", \"LDH\", \"GGT\", \"ALKPHOS\", \"BILITOT\", \"BILIDIR\", \"AMMONIA\", \"AMYLASE\", \"LIPASE\", \"LACTATE\", \"CHOL\", \"HDL\", \"CHOLHDLRATIO\", \"TRIG\", \"VLDL\", \"HZK40ST\", \"PHENYTOIN\", \"PHENYF\", \"URICACID\", \"POCGLU\" in the last 72 hours.    Invalid input(s): \"LABGGT\", \"LDLCHOLESTEROL\"  ABG:No results found for: \"POCPH\", \"PHART\", \"PH\", \"POCPCO2\", \"PIV6DNH\", \"PCO2\", \"POCPO2\", \"PO2ART\", \"PO2\", \"POCHCO3\", \"AXY2ORI\", \"HCO3\", \"NBEA\", \"PBEA\", \"BEART\", \"BE\", \"THGBART\", \"THB\", \"WRD6JTI\", \"REVJ8LQH\", \"C4EIBTGZ\",  \"O2SAT\", \"FIO2\"  Lab Results   Component Value Date/Time    SPECIAL L FA 05/15/2024 09:39 AM     Lab Results   Component Value Date/Time    CULTURE NO GROWTH 5 DAYS 05/15/2024 09:39 AM       Radiology:  US RENAL COMPLETE    Result Date: 5/17/2024  1. Unremarkable ultrasound of the right kidney. 2. The left kidney cannot be visualized.     XR LUMBAR SPINE (2-3 VIEWS)    Result Date: 5/16/2024  1. No acute fracture or dislocation. 2. Mild degenerative disc disease at L5-S1.     XR TIBIA FIBULA RIGHT (2 VIEWS)    Result Date: 5/15/2024  No radiographic evidence of osteomyelitis. If there is concern for osteomyelitis, MRI is recommended.       Physical Examination:        General appearance:  alert, cooperative and no distress  Mental Status:  oriented to person, place and time and normal affect  Lungs:  clear to auscultation bilaterally, normal effort  Heart:  regular rate and rhythm, no murmur  Abdomen:  soft, nontender, obese, normal bowel sounds, no masses, hepatomegaly, splenomegaly  Extremities:  +1 edema bilat legs, redness, tenderness in the calves  Skin:  right shin wound, c/d/I, dry flaky skin, chronic venous skin changes bilat legs    Assessment:        Hospital Problems             Last Modified POA    * (Principal) Acute kidney injury superimposed on CKD ([facility]) 5/22/2024 Yes    Obesity (BMI 35.0-39.9 without comorbidity) 5/15/2024 Yes    Bipolar 1 disorder ([facility]) 5/15/2024 Yes    Cellulitis of right lower extremity 5/15/2024 Yes    CRP elevated 5/16/2024 Yes    Acute bilateral low back pain without sciatica 5/17/2024 Yes    Fall 5/17/2024 Yes    Stage 3b chronic kidney disease (HCC) 5/18/2024 Yes    IgA nephropathy 5/18/2024 Yes    Morbid obesity ([facility]) 5/18/2024 Yes    Essential hypertension 5/18/2024 Yes    Bilateral lower extremity edema 5/18/2024 Yes       Plan:        Right LE cellulitis- con't IV Cefazolin TID per ID, will switch to IV Rocephin upon discharge thru 6/5, leukocytosis resolved,  has a Picc  line  Chronic lymphedema- ACE wraps, Lymphopress, elevate  JONNA on CKD 3- likely from overdiuresis in addition to Lisinopril, Cr improved with IV hydration, Cr 2.1 near baseline, Nephrology restarted Demadex, repeat BMP tomorrow  HTN- BP elevated, resume Coreg 25 mg BID which is decreased to avoid bradycardia, con't Hydralazine, Aldactone, Clonidine, Lisinopril on hold, Demadex restarted  P A.fib- HR controlled on Coreg, con't Eliquis  Bipolar d/o- con't aripiprazole  MO- encourage diet and weight loss  Start Micotin powder BID  PT/OT  DVT proph- on Eliquis  Awaiting SNF placement, okay to discharge when arrangements made    Mili Rangel MD  5/24/2024  8:19 AM

## 2024-05-24 NOTE — PROGRESS NOTES
[facility] -   Infectious diseases evaluation  admission date 5/15/2024    reason for consultation:   RLE cellultis    Impression :   Current:  Bilateral lower extremity lymphedema  Right lower extremity cellulitis  Cellulitis R thigh lymphedema panus  JONNA on CKD  Paroxysmal A-fib    Other:    Discussion / summary of stay / plan of care/ Recommendations:     HENCE:   RLE and right thigh redness and edema are extensive - will take a long time to show resolution despite the correct AB    On ancef 5/15- upon DC , switch to ceftriaxone 2 g daily till 6/5  Therapy lengthened due to expected lengthy time of recovery of the edematous area of the R thigh panus   - 149 - 80 - 51 better  5/17 Cellulitis clinically better - reconsiled w picc and ceftriaxone for DC  Compression RLE    Infection Control Recommendations   [place] Precautions  Contact Isolation       Antimicrobial Stewardship Recommendations   Simplification of therapy  Targeted therapy    History of Present Illness:   Initial history:  Stevo Avilez is a 53 y.o.-year-old male with a history of lymphedema obesity A-fib comes in because of frequent falls and inability to walk lower extremity lymphedema has gotten worse, creatinine 3.5 with a baseline of 1.8  Was noticed to have a right lower extremity cellulitis and admitted to the hospital, infectious consulted    He is on ancef        Interval changes  5/24/2024   Patient Vitals for the past 8 hrs:   BP Pulse Resp   05/24/24 0950 (!) 171/99 56 17   05/24/24 0655 (!) 187/106 65 19       5/17  Cellulitis better - CRP better - sluggish likely from pain meds - no fever    5/18  Left panus much better less red and still very swollen and tender -  Ox 3 and abd soft non tender  WBC normal  No + cx    5/19  Cellulitis R LE much better - still very edematous  BC neg  WBC 5 improved  CRP better 51    5/20  R thigh panus smaller and much ess red - pain better  -    5/21  Panus slowly but surely better - less red - R leg color back tp almost normal - abd spft no tender -     5/22  R leg panus smaller and ess red less tender - leg less red and back to normal color  Ox 3 and seems rested    5/23  Better and edema and redness improved - no nausea, vomiting, diarrhea    WBC 7.6  Creat 2.1 improved    5/24  Awaiting placement and precert - R thigh edema is better and less tender less red - right leg better as well - he feels betetr     Summary of relevant labs:  Labs:  W 13 - 11   - 51  Micro:  BC 5/15      Procedures      Cardiology      Imaging:      I have personally reviewed the past medical history, past surgical history, medications, social history, and family history, and I haveupdated the database accordingly.      Allergies:   Patient has no known allergies.     Review of Systems:     Review of Systems   Constitutional:  Positive for activity change. Negative for chills, diaphoresis, fatigue and fever.   HENT:  Negative for congestion and drooling.    Eyes:  Negative for photophobia, pain, discharge and redness.   Respiratory:  Negative for apnea, cough and choking.    Cardiovascular:  Positive for leg swelling. Negative for chest pain.   Gastrointestinal:  Negative for abdominal distention.   Endocrine: Negative for cold intolerance, heat intolerance, polydipsia, polyphagia and polyuria.   Genitourinary:  Negative for dysuria, flank pain and frequency.   Musculoskeletal:  Negative for arthralgias.   Skin:  Positive for color change (RLE red).   Allergic/Immunologic: Negative for immunocompromised state.   Neurological:  Negative for dizziness, light-headedness and numbness.   Hematological:  Negative for adenopathy.   Psychiatric/Behavioral:  Negative for agitation.        Physical Examination :       Physical Exam  Constitutional:       General: He is not in acute distress.     Appearance: He is not ill-appearing or toxic-appearing.   HENT:      Head:

## 2024-05-24 NOTE — CARE COORDINATION
Transitional Planning    Spoke with Catarina at [facility] can accept they will start precert today.  She needs updated PT OT notes   PT OT present in room for treatment await notes to be filed  Spoke with Catarina and precert has been started  Called pt spouse Davina [phone] gave her update with the above    Catarina called from [facility] they have precert    4;30  [facility] left voicemail

## 2024-05-24 NOTE — PROGRESS NOTES
Pt given CHG bath, pt refused turns multiple times through out shift - as well as during the bath. Pt agreed with ACE wraps on legs, PICC dressing change & estevez pull.

## 2024-05-24 NOTE — PLAN OF CARE
Problem: Discharge Planning  Goal: Discharge to home or other facility with appropriate resources  Outcome: Progressing  Flowsheets (Taken 5/23/2024 2000)  Discharge to home or other facility with appropriate resources:   Identify barriers to discharge with patient and caregiver   Arrange for needed discharge resources and transportation as appropriate   Identify discharge learning needs (meds, wound care, etc)   Refer to discharge planning if patient needs post-hospital services based on physician order or complex needs related to functional status, cognitive ability or social support system     Problem: Pain  Goal: Verbalizes/displays adequate comfort level or baseline comfort level  Outcome: Progressing     Problem: Skin/Tissue Integrity  Goal: Absence of new skin breakdown  Description: 1.  Monitor for areas of redness and/or skin breakdown  2.  Assess vascular access sites hourly  3.  Every 4-6 hours minimum:  Change oxygen saturation probe site  4.  Every 4-6 hours:  If on nasal continuous positive airway pressure, respiratory therapy assess nares and determine need for appliance change or resting period.  Outcome: Progressing     Problem: Safety - Adult  Goal: Free from fall injury  Outcome: Progressing     Problem: ABCDS Injury Assessment  Goal: Absence of physical injury  Outcome: Progressing

## 2024-05-24 NOTE — DISCHARGE INSTR - COC
Continuity of Care Form    Patient Name: Stevo Avilez   :  1970  MRN:  [id number]    Admit date:  5/15/2024  Discharge date:  2024    Code Status Order: Full Code   Advance Directives:     Admitting Physician:  No admitting provider for patient encounter.  PCP: Michael Godinez APRN - CNP    Discharging Nurse: Penelope CASTRO  Discharging Hospital Unit/Room#:   Discharging Unit Phone Number: [phone]    Emergency Contact:   Extended Emergency Contact Information  Primary Emergency Contact: Davina Avilez   [facility]  Home Phone: [phone]  Mobile Phone: [phone]  Relation: None    Past Surgical History:  No past surgical history on file.    Immunization History:   Immunization History   Administered Date(s) Administered    COVID-19, MODERNA, ( formula), (age 12y+), IM, 50mcg/0.5mL 2024    COVID-19, PFIZER GRAY top, DO NOT Dilute, (age 12 y+), IM, 30 mcg/0.3 mL 2022    COVID-19, PFIZER PURPLE top, DILUTE for use, (age 12 y+), 30mcg/0.3mL 2021, 10/02/2021       Active Problems:  Patient Active Problem List   Diagnosis Code    Hypertensive urgency I16.0    Acute kidney injury superimposed on CKD ([facility]) N17.9, N18.9    Hypokalemia E87.6    PRIMO (obstructive sleep apnea) G47.33    Obesity (BMI 35.0-39.9 without comorbidity) E66.9    Prediabetes R73.03    Bipolar 1 disorder ([facility]) F31.9    Hypertensive emergency I16.1    Iron deficiency anemia D50.9    Varicose veins of legs I83.93    Dermatitis L30.9    Cellulitis L03.90    Cellulitis of right lower extremity L03.115    CRP elevated R79.82    Acute bilateral low back pain without sciatica M54.50    Fall W19.XXXA    Stage 3b chronic kidney disease (HCC) N18.32    IgA nephropathy N02.B9    Morbid obesity ([facility]) E66.01    Essential hypertension I10    Bilateral lower extremity edema R60.0       Isolation/Infection:   Isolation            No Isolation          Patient Infection Status       None to display             Nurse Assessment:  Last Vital Signs: BP (!) 176/96   Pulse 71   Temp 97.9 °F (36.6 °C) (Oral)   Resp 20   Ht 1.854 m (6' 0.99\")   Wt (!) 218.8 kg (482 lb 5.9 oz)   SpO2 96%   BMI 63.65 kg/m²     Last documented pain score (0-10 scale): Pain Level: 7  Last Weight:   Wt Readings from Last 1 Encounters:   05/15/24 (!) 218.8 kg (482 lb 5.9 oz)     Mental Status:  oriented and alert    IV Access:  - PICC - site  R Basilic, insertion date: 5/20/2024    Nursing Mobility/ADLs:  Walking   Dependent  Transfer  Dependent  Bathing  Dependent  Dressing  Dependent  Toileting  Dependent  Feeding  Independent  Med Admin  Assisted  Med Delivery   whole    Wound Care Documentation and Therapy:  Wound 10/01/20 Leg Right;Lower;Lateral (Active)   Number of days: 1331       Wound 10/01/20 Thigh Right;Posterior (Active)   Number of days: 1330        Elimination:  Continence:   Bowel: Yes  Bladder: No  Urinary Catheter: None   Colostomy/Ileostomy/Ileal Conduit: No       Date of Last BM: 5/22/2024    Intake/Output Summary (Last 24 hours) at 5/24/2024 1622  Last data filed at 5/24/2024 0711  Gross per 24 hour   Intake 800 ml   Output 4950 ml   Net -4150 ml     I/O last 3 completed shifts:  In: 1520 [P.O.:1520]  Out: 5075 [Urine:5075]    Safety Concerns:     At Risk for Falls    Impairments/Disabilities:      Bilateral lower extremity lymphedema    Nutrition Therapy:  Current Nutrition Therapy:   - Oral Diet:  Low fat less than or equal to 50 gm/day    Routes of Feeding: Oral  Liquids: Thin Liquids  Daily Fluid Restriction: no  Last Modified Barium Swallow with Video (Video Swallowing Test): not done    Treatments at the Time of Hospital Discharge:   Respiratory Treatments: none  Oxygen Therapy:  is not on home oxygen therapy.  Ventilator:    - No ventilator support    Rehab Therapies: Physical Therapy and Occupational Therapy  Weight Bearing Status/Restrictions: No weight bearing restrictions  Other Medical Equipment (for

## 2024-05-24 NOTE — PROGRESS NOTES
Occupational Therapy  Facility/Department: [facility]- [facility]PD[facility]  Occupational Therapy Daily Progress Note  Name: Stevo Avilez  : 1970  MRN: [id number]  Date of Service: 2024    Discharge Recommendations:Further therapy recommended at discharge.   Patient would benefit from continued therapy after discharge  OT Equipment Recommendations  Equipment Needed:  (CTA as pt. progresses.)       Patient Diagnosis(es): The primary encounter diagnosis was Fall, initial encounter. A diagnosis of Cellulitis of right lower extremity was also pertinent to this visit.  Past Medical History:  has a past medical history of Bipolar 1 disorder (HCC) and Hypertension.  Past Surgical History:  has no past surgical history on file.           Assessment   Performance deficits / Impairments: Decreased functional mobility ;Decreased ADL status;Decreased strength;Decreased cognition;Decreased balance;Decreased high-level IADLs;Decreased coordination  Prognosis: Good  Decision Making: High Complexity  REQUIRES OT FOLLOW-UP: Yes  Activity Tolerance  Activity Tolerance: Patient limited by fatigue;Patient Tolerated treatment well;Patient limited by pain        Plan   Occupational Therapy Plan  Times Per Week: 4-5  Current Treatment Recommendations: Strengthening, ROM, Balance training, Functional mobility training, Endurance training, Pain management, Safety education & training, Patient/Caregiver education & training, Equipment evaluation, education, & procurement, Positioning, Self-Care / ADL     Restrictions  Restrictions/Precautions  Restrictions/Precautions: General Precautions  Required Braces or Orthoses?: No  Position Activity Restriction  Other position/activity restrictions: up w/ assistance    Subjective   General  Patient assessed for rehabilitation services?: Yes  Family / Caregiver Present: No  General Comment  Comments: OK for OT tx per RN. Pt reports 3/10 pain in behind B knees. Pt. needing lots of encouragement and  mobility requiring skilled physical assistance of two professionals to simultaneously address individualized discipline goals. OT is addressing ADL transfers/standing tolerance, while PT is addressing their individualized functional mobility task.   LENKA Barrera/MONA

## 2024-05-25 VITALS
RESPIRATION RATE: 18 BRPM | HEART RATE: 71 BPM | HEIGHT: 73 IN | DIASTOLIC BLOOD PRESSURE: 87 MMHG | SYSTOLIC BLOOD PRESSURE: 163 MMHG | WEIGHT: 315 LBS | BODY MASS INDEX: 41.75 KG/M2 | OXYGEN SATURATION: 93 % | TEMPERATURE: 97.9 F

## 2024-05-25 LAB
ANION GAP SERPL CALCULATED.3IONS-SCNC: 12 MMOL/L (ref 9–16)
BUN SERPL-MCNC: 30 MG/DL (ref 6–20)
CALCIUM SERPL-MCNC: 8.4 MG/DL (ref 8.6–10.4)
CHLORIDE SERPL-SCNC: 105 MMOL/L (ref 98–107)
CO2 SERPL-SCNC: 24 MMOL/L (ref 20–31)
CREAT SERPL-MCNC: 2.1 MG/DL (ref 0.7–1.2)
EKG ATRIAL RATE: 56 BPM
EKG ATRIAL RATE: 56 BPM
EKG P AXIS: 38 DEGREES
EKG P AXIS: 38 DEGREES
EKG P-R INTERVAL: 270 MS
EKG P-R INTERVAL: 270 MS
EKG Q-T INTERVAL: 448 MS
EKG Q-T INTERVAL: 448 MS
EKG QRS DURATION: 114 MS
EKG QRS DURATION: 114 MS
EKG QTC CALCULATION (BAZETT): 432 MS
EKG QTC CALCULATION (BAZETT): 432 MS
EKG R AXIS: -48 DEGREES
EKG R AXIS: -48 DEGREES
EKG T AXIS: 101 DEGREES
EKG T AXIS: 101 DEGREES
EKG VENTRICULAR RATE: 56 BPM
EKG VENTRICULAR RATE: 56 BPM
GFR, ESTIMATED: 39 ML/MIN/1.73M2
GLUCOSE SERPL-MCNC: 95 MG/DL (ref 74–99)
POTASSIUM SERPL-SCNC: 4.4 MMOL/L (ref 3.7–5.3)
SODIUM SERPL-SCNC: 141 MMOL/L (ref 136–145)

## 2024-05-25 PROCEDURE — 6370000000 HC RX 637 (ALT 250 FOR IP): Performed by: STUDENT IN AN ORGANIZED HEALTH CARE EDUCATION/TRAINING PROGRAM

## 2024-05-25 PROCEDURE — 80048 BASIC METABOLIC PNL TOTAL CA: CPT

## 2024-05-25 PROCEDURE — 6370000000 HC RX 637 (ALT 250 FOR IP): Performed by: INTERNAL MEDICINE

## 2024-05-25 PROCEDURE — 36415 COLL VENOUS BLD VENIPUNCTURE: CPT

## 2024-05-25 PROCEDURE — 93005 ELECTROCARDIOGRAM TRACING: CPT | Performed by: STUDENT IN AN ORGANIZED HEALTH CARE EDUCATION/TRAINING PROGRAM

## 2024-05-25 PROCEDURE — 2580000003 HC RX 258: Performed by: STUDENT IN AN ORGANIZED HEALTH CARE EDUCATION/TRAINING PROGRAM

## 2024-05-25 PROCEDURE — 99233 SBSQ HOSP IP/OBS HIGH 50: CPT | Performed by: STUDENT IN AN ORGANIZED HEALTH CARE EDUCATION/TRAINING PROGRAM

## 2024-05-25 PROCEDURE — 6360000002 HC RX W HCPCS: Performed by: STUDENT IN AN ORGANIZED HEALTH CARE EDUCATION/TRAINING PROGRAM

## 2024-05-25 RX ADMIN — ARIPIPRAZOLE 15 MG: 15 TABLET ORAL at 11:14

## 2024-05-25 RX ADMIN — DOCUSATE SODIUM 200 MG: 100 CAPSULE, LIQUID FILLED ORAL at 09:32

## 2024-05-25 RX ADMIN — APIXABAN 2.5 MG: 5 TABLET, FILM COATED ORAL at 20:01

## 2024-05-25 RX ADMIN — Medication 2000 MG: at 20:01

## 2024-05-25 RX ADMIN — HYDRALAZINE HYDROCHLORIDE 100 MG: 50 TABLET ORAL at 07:49

## 2024-05-25 RX ADMIN — Medication 2000 MG: at 05:53

## 2024-05-25 RX ADMIN — SODIUM CHLORIDE, PRESERVATIVE FREE 10 ML: 5 INJECTION INTRAVENOUS at 09:32

## 2024-05-25 RX ADMIN — TORSEMIDE 10 MG: 20 TABLET ORAL at 09:34

## 2024-05-25 RX ADMIN — APIXABAN 2.5 MG: 5 TABLET, FILM COATED ORAL at 09:32

## 2024-05-25 RX ADMIN — ANTI-FUNGAL POWDER MICONAZOLE NITRATE TALC FREE: 1.42 POWDER TOPICAL at 09:32

## 2024-05-25 RX ADMIN — SPIRONOLACTONE 25 MG: 25 TABLET, FILM COATED ORAL at 09:34

## 2024-05-25 RX ADMIN — HYDRALAZINE HYDROCHLORIDE 100 MG: 50 TABLET ORAL at 13:54

## 2024-05-25 RX ADMIN — GABAPENTIN 200 MG: 100 CAPSULE ORAL at 09:32

## 2024-05-25 RX ADMIN — HYDRALAZINE HYDROCHLORIDE 100 MG: 50 TABLET ORAL at 20:01

## 2024-05-25 RX ADMIN — GABAPENTIN 200 MG: 100 CAPSULE ORAL at 20:01

## 2024-05-25 RX ADMIN — ANTI-FUNGAL POWDER MICONAZOLE NITRATE TALC FREE: 1.42 POWDER TOPICAL at 20:01

## 2024-05-25 RX ADMIN — Medication 2000 MG: at 13:54

## 2024-05-25 ASSESSMENT — ENCOUNTER SYMPTOMS
PHOTOPHOBIA: 0
EYE DISCHARGE: 0
APNEA: 0
CHOKING: 0
EYE PAIN: 0
COUGH: 0
COLOR CHANGE: 1
EYE REDNESS: 0
ABDOMINAL DISTENTION: 0

## 2024-05-25 ASSESSMENT — PAIN SCALES - GENERAL: PAINLEVEL_OUTOF10: 0

## 2024-05-25 NOTE — CARE COORDINATION
Transportation request faxed to [facility] for \"will call\"    1063 spoke to Catarina from [facility]. They are able to accept today. Spoke to Rosalie from [facility]. The soonest they have available is 8 pm. HENS completed    1228 AVS faxed    1253 Catarina from [facility] updated. Patient's wife, Davina, updated    Discharge Report    [facility]  Clinical Case Management Department  Written by: Sonam Merino RN    Patient Name: Stevo Avilez  Attending Provider: Ryan Kline MD  Admit Date: 5/15/2024  9:14 AM  MRN: [id number]  Account: [id number]                     : 1970  Discharge Date: 2024      Disposition: [facility]    Sonam Merino RN

## 2024-05-25 NOTE — PLAN OF CARE
Problem: Discharge Planning  Goal: Discharge to home or other facility with appropriate resources  5/25/2024 1829 by Penelope Novak RN  Outcome: Progressing  5/25/2024 0852 by Rupa Lazo RN  Outcome: Progressing  Flowsheets (Taken 5/24/2024 2000 by Kayleen Beasley RN)  Discharge to home or other facility with appropriate resources:   Identify barriers to discharge with patient and caregiver   Arrange for needed discharge resources and transportation as appropriate   Identify discharge learning needs (meds, wound care, etc)   Refer to discharge planning if patient needs post-hospital services based on physician order or complex needs related to functional status, cognitive ability or social support system     Problem: Pain  Goal: Verbalizes/displays adequate comfort level or baseline comfort level  5/25/2024 1829 by Penelope Novak RN  Outcome: Progressing  5/25/2024 0852 by Rupa Lazo RN  Outcome: Progressing     Problem: Skin/Tissue Integrity  Goal: Absence of new skin breakdown  Description: 1.  Monitor for areas of redness and/or skin breakdown  2.  Assess vascular access sites hourly  3.  Every 4-6 hours minimum:  Change oxygen saturation probe site  4.  Every 4-6 hours:  If on nasal continuous positive airway pressure, respiratory therapy assess nares and determine need for appliance change or resting period.  Outcome: Progressing     Problem: Safety - Adult  Goal: Free from fall injury  5/25/2024 1829 by Penelope Novak RN  Outcome: Progressing  5/25/2024 0852 by Rupa Lazo RN  Outcome: Progressing     Problem: ABCDS Injury Assessment  Goal: Absence of physical injury  Outcome: Progressing  Flowsheets (Taken 5/25/2024 1006)  Absence of Physical Injury: Implement safety measures based on patient assessment

## 2024-05-25 NOTE — DISCHARGE SUMMARY
[facility]  Office: [phone]  Phillip Pierson DO, Brendon Barney DO, Efra Aly DO, Dinh Garcia DO, Ariel Snyder MD, Mili Rangel MD, Emily Juarez MD, Brandie Erazo MD,  Nikolas Obrien MD, Layton Mejia MD, Andrew Forbes MD,  Nicole Lai DO, Penny Arriaga MD, Doc Fatima MD, Massimo Pierson DO, Yamilet Harper MD,  Dameon Pineda DO, Brittany Zayas MD, Geneva Snowden MD, Keely Manzo MD, Ryan Kline MD,  Newton Mckeon MD, Yanna Vasquez MD, Neal Li MD, Liliana Turner MD, Luis Parkinson MD, Bishop Tariq MD, Amanuel Garcia DO, Antoine Conrad DO, Jesus Correa MD,  Pj Solomon MD, Shirley Waterhouse, CNP,  Katherin Bruno CNP, Robby Gallardo, CNP,  Adriana Foy, DANTE, Shelley Buck, CNP, Giulia Sunshine, CNP, Merline Person CNP, Maura Viera, CNP, Whitney Albarran, PA-C, Roxy Mcclelland PA-C, Jeanne Hernandez, CNP, Madeline Carreon, CNP, Vega Young, CNP, Sophia Malone, CNP, Yane Roberts, CNP, Inge Mathias, CNS, Katherin Nazario, CNP, Bobbi Fernandes CNP, Tracy Schwab, CNP         [facility]   IN-PATIENT SERVICE   [facility]    Discharge Summary     Patient ID: Stevo Avilez  :  1970   MRN: [id number]     ACCOUNT:  [id number]   Patient's PCP: Michael Godinez APRN - CNP  Admit Date: 5/15/2024   Discharge Date: 2024 ***    Length of Stay: 10  Code Status:  Full Code  Admitting Physician: No admitting provider for patient encounter.  Discharge Physician: Ryan Kline MD     Active Discharge Diagnoses:     Hospital Problem Lists:  Principal Problem:    Acute kidney injury superimposed on CKD (HCC)  Active Problems:    Obesity (BMI 35.0-39.9 without comorbidity)    Bipolar 1 disorder (HCC)    Cellulitis of right lower extremity    CRP elevated    Acute bilateral low back pain without sciatica    Fall    Stage 3b chronic kidney disease (HCC)    IgA nephropathy    Morbid obesity (HCC)    Essential hypertension     Lisinopril on hold due to JONNA.  P A.fib-bradycardic this morning.  Held Coreg. Con't Eliquis  Bipolar d/o- con't aripiprazole  MO- encourage diet and weight loss  Micotin powder BID    Significant therapeutic interventions: ***    Significant Diagnostic Studies:   Labs / Micro:  {LABS:[id number]}   ***  Radiology:  No results found.    Consultations:    Consults:     Final Specialist Recommendations/Findings:   IP CONSULT TO HOSPITALIST  IP CONSULT TO INFECTIOUS DISEASES  IP CONSULT TO NEPHROLOGY  IP CONSULT TO IV TEAM      The patient was seen and examined on day of discharge and this discharge summary is in conjunction with any daily progress note from day of discharge.    Discharge plan:     Disposition: {DISPOSITIONS:930473642}    Physician Follow Up:   ***  Padma Pozo MD  [facility] [facility], [address]  [place] [address]  [phone]    Schedule an appointment as soon as possible for a visit in 2 week(s)      [place]  [facility] [address]  [place] [address]  [phone]           Requiring Further Evaluation/Follow Up POST HOSPITALIZATION/Incidental Findings: ***    Diet: {diet:06355}    Activity: As tolerated***    Instructions to Patient: ***    Discharge Medications:      Medication List        START taking these medications      cefTRIAXone  infusion  Commonly known as: ROCEPHIN  Infuse 2,000 mg intravenously every 24 hours for 18 days Stop after 6/5 then pull line  Cbc diff LFT weekly  - no line draws -     polyethylene glycol 17 g packet  Commonly known as: GLYCOLAX  Take 1 packet by mouth daily as needed for Constipation            CHANGE how you take these medications      apixaban 2.5 MG Tabs tablet  Commonly known as: Eliquis  Take 1 tablet by mouth 2 times daily  What changed:   medication strength  how much to take     ARIPiprazole 15 MG tablet  Commonly known as: ABILIFY  Take 1 tablet by mouth daily  What changed:   medication strength  how much to take     carvedilol 12.5 MG

## 2024-05-25 NOTE — PLAN OF CARE
Problem: Discharge Planning  Goal: Discharge to home or other facility with appropriate resources  Outcome: Progressing  Flowsheets (Taken 5/24/2024 2000 by Kayleen Beasley RN)  Discharge to home or other facility with appropriate resources:   Identify barriers to discharge with patient and caregiver   Arrange for needed discharge resources and transportation as appropriate   Identify discharge learning needs (meds, wound care, etc)   Refer to discharge planning if patient needs post-hospital services based on physician order or complex needs related to functional status, cognitive ability or social support system     Problem: Pain  Goal: Verbalizes/displays adequate comfort level or baseline comfort level  Outcome: Progressing     Problem: Safety - Adult  Goal: Free from fall injury  Outcome: Progressing

## 2024-05-25 NOTE — PROGRESS NOTES
[facility]  Office: [phone]  Phillip Pierson DO, Brendon Barney, DO, Efar Aly DO, Dinh Garcia, DO, Ariel Snyder MD, Mili Rangel MD, Emily Juarez MD, Brandie Erazo MD,  Nikolas Obrien MD, Layton Mejia MD, Andrew Forbes MD,  Nicole Lai DO, Penny Arriaga MD, Doc Fatima MD, Massimo Pierson DO, Yamilet Harper MD,  Dameon Pineda DO, Brittany Zayas MD, Geneva Snowden MD, Keely Manzo MD, Ryan Kline MD,  Newton Mckeon MD, Yanna Vasquez MD, Neal Li MD, Liliana Turner MD, Luis Parkinson MD, Bishop Tariq MD, Amanuel Garcia DO, Antoine Conrad DO, Jesus Correa MD,  Pj Solomon MD, Shirley Waterhouse, CNP,  Katherin Bruno CNP, Robby Gallardo, CNP,  Adriana Foy, DNP, Shelley Buck, CNP, Giulia Sunshine, CNP, Merline Person CNP, Maura Viera, CNP, Whitney Albarran, PA-C, Roxy Mcclelland PA-C, Jeanne Hernandez, CNP, Madeline Carreon, CNP, Vega Young, CNP, Sophia Malone, CNP, Yane Roberts, CNP, Inge Mathias, CNS, Katherin Nazario, CNP, Bobbi Fernandes CNP, Tracy Schwab, CNP         [facility]   IN-PATIENT SERVICE   [facility]    Progress Note    5/25/2024    7:49 AM    Name:   Stevo Avilez  MRN:     [id number]     Acct:      [id number]   Room:   2010/2010-01  IP Day:  10  Admit Date:  5/15/2024  9:14 AM    PCP:   Michael Godinez APRN - CNP  Code Status:  Full Code    Subjective:     C/C:   Chief Complaint   Patient presents with    Fatigue     Interval History Status: improved.   No acute issues overnight.  Mild bradycardia noted this morning.  Otherwise asymptomatic and denies any current complaints.  Encouraged to work with PT OT.  Pending placement.       Brief History:     Per my partner:  \"This is a 53-year-old male with a significant past medical history of hypertension, paroxysmal atrial fibrillation, CKD stage IIIb and obesity who initially presented emergency department with inability to ambulate and frequent falls   has a Picc line  Chronic lymphedema- ACE wraps, Lymphopress, elevation.  JONNA on CKD 3- likely from overdiuresis in addition to Lisinopril, Cr improved with IV hydration, Cr 2.1 near baseline.restarted on Demadex per nephrology.    HTN- BP elevated, intermittent bradycardia noted.  Complicated by sleep apnea/body habitus.  Hold Coreg and nifedipine for now., con't Hydralazine, Aldactone, Clonidine, Lisinopril on hold due to JONNA.  P A.fib-bradycardic this morning.  Held Coreg. Con't Eliquis  Bipolar d/o- con't aripiprazole  MO- encourage diet and weight loss  Micotin powder BID  PT/OT  DVT proph- on Eliquis  Awaiting SNF placement, okay to discharge when arrangements made    Ryan Kline MD  5/25/2024  7:49 AM

## 2024-05-25 NOTE — PROGRESS NOTES
polyuria.   Genitourinary:  Negative for dysuria, flank pain and frequency.   Musculoskeletal:  Negative for arthralgias.   Skin:  Positive for color change (RLE red).   Allergic/Immunologic: Negative for immunocompromised state.   Neurological:  Negative for dizziness, light-headedness and numbness.   Hematological:  Negative for adenopathy.   Psychiatric/Behavioral:  Negative for agitation.        Physical Examination :       Physical Exam  Constitutional:       General: He is not in acute distress.     Appearance: He is not ill-appearing or toxic-appearing.   HENT:      Head: Normocephalic and atraumatic.      Nose: Nose normal. No congestion.      Mouth/Throat:      Mouth: Mucous membranes are moist.   Eyes:      General: No scleral icterus.     Conjunctiva/sclera: Conjunctivae normal.   Cardiovascular:      Rate and Rhythm: Normal rate and regular rhythm.      Heart sounds: No murmur heard.     No friction rub. No gallop.   Pulmonary:      Effort: No respiratory distress.      Breath sounds: No stridor. No wheezing, rhonchi or rales.   Chest:      Chest wall: No tenderness.   Abdominal:      General: There is no distension.      Palpations: There is no mass.      Tenderness: There is no abdominal tenderness. There is no guarding or rebound.      Hernia: No hernia is present.   Musculoskeletal:         General: No swelling, tenderness or deformity.      Cervical back: Neck supple. No rigidity or tenderness.      Right lower leg: Edema present.      Left lower leg: Edema present.   Skin:     Coloration: Skin is not jaundiced.      Findings: Erythema (red RLE) present. No bruising.   Neurological:      Mental Status: He is alert and oriented to person, place, and time.      Cranial Nerves: No cranial nerve deficit.      Sensory: No sensory deficit.      Motor: Weakness present.   Psychiatric:         Mood and Affect: Mood normal.         Thought Content: Thought content normal.         Past Medical History:      Past Medical History:   Diagnosis Date    Bipolar 1 disorder (HCC)     Hypertension        Past Surgical  History:   No past surgical history on file.    Medications:      docusate sodium  200 mg Oral Daily    [Held by provider] carvedilol  25 mg Oral BID WC    spironolactone  25 mg Oral Daily    miconazole   Topical BID    gabapentin  200 mg Oral BID    torsemide  10 mg Oral Daily    NIFEdipine  60 mg Oral Daily    hydrALAZINE  100 mg Oral 3 times per day    cloNIDine  1 patch TransDERmal Weekly    apixaban  2.5 mg Oral BID    ARIPiprazole  15 mg Oral Daily    sodium chloride flush  5-40 mL IntraVENous 2 times per day    ceFAZolin  2,000 mg IntraVENous Q8H       Social History:     Social History     Socioeconomic History    Marital status:      Spouse name: Not on file    Number of children: Not on file    Years of education: Not on file    Highest education level: Not on file   Occupational History    Not on file   Tobacco Use    Smoking status: Never    Smokeless tobacco: Never   Substance and Sexual Activity    Alcohol use: Yes    Drug use: No    Sexual activity: Not on file   Other Topics Concern    Not on file   Social History Narrative    Not on file     Social Determinants of Health     Financial Resource Strain: Not on file   Food Insecurity: Not on file   Transportation Needs: Not on file   Physical Activity: Not on file   Stress: Not on file   Social Connections: Not on file   Intimate Partner Violence: Not on file   Housing Stability: Not on file       Family History:   No family history on file.   Medical Decision Making:   I have independently reviewed/ordered the following labs:    CBC with Differential:   No results for input(s): \"WBC\", \"HGB\", \"HCT\", \"PLT\", \"BANDSPCT\", \"LYMPHOPCT\", \"MONOPCT\", \"EOSPCT\" in the last 72 hours.    Invalid input(s): \"SEGSPCT\"    BMP:  Recent Labs     05/25/24  0610      K 4.4      CO2 24   BUN 30*   CREATININE 2.1*       Hepatic Function Panel:   No  results for input(s): \"PROT\", \"LABALBU\", \"BILIDIR\", \"IBILI\", \"BILITOT\", \"ALKPHOS\", \"ALT\", \"AST\" in the last 72 hours.    No results for input(s): \"RPR\" in the last 72 hours.  No results for input(s): \"HIV\" in the last 72 hours.  No results for input(s): \"BC\" in the last 72 hours.  Lab Results   Component Value Date/Time    CREATININE 2.1 05/25/2024 06:10 AM    GLUCOSE 95 05/25/2024 06:10 AM    GLUCOSE 129 05/01/2024 09:19 AM       Detailed results:        Thank you for allowing us to participate in the care of this patient.Please call with questions.    This note is created with the assistance of a speech recognition program.  While intending to generate adocument that actually reflects the content of the visit, the document can still have some errors including those of syntax and sound a like substitutions which may escape proof reading.  It such instances, actual meaningcan be extrapolated by contextual diversion.    Chris Carbone MD  Office: [phone]  [employer] / office [phone]

## 2024-05-26 NOTE — PROGRESS NOTES
Patient was given discharge instructions and verbalized understanding. Patient left with all personal belongings including discharge paperwork.  Patient was taken by EMT via stretcher to be taken [facility].  Report was called to the facility, and was notified patient is on route and will need special accommodations.

## 2024-05-26 NOTE — PLAN OF CARE
Problem: Discharge Planning  Goal: Discharge to home or other facility with appropriate resources  5/25/2024 2023 by Ramone Mondragon RN  Outcome: Adequate for Discharge  5/25/2024 1829 by Penelope Novak RN  Outcome: Progressing  5/25/2024 0852 by Rupa Lazo RN  Outcome: Progressing  Flowsheets (Taken 5/24/2024 2000 by Kayleen Beasley RN)  Discharge to home or other facility with appropriate resources:   Identify barriers to discharge with patient and caregiver   Arrange for needed discharge resources and transportation as appropriate   Identify discharge learning needs (meds, wound care, etc)   Refer to discharge planning if patient needs post-hospital services based on physician order or complex needs related to functional status, cognitive ability or social support system     Problem: Pain  Goal: Verbalizes/displays adequate comfort level or baseline comfort level  5/25/2024 2023 by Ramone Mondragon RN  Outcome: Adequate for Discharge  5/25/2024 1829 by Penelope Novak RN  Outcome: Progressing  5/25/2024 0852 by Rupa Lazo RN  Outcome: Progressing     Problem: Skin/Tissue Integrity  Goal: Absence of new skin breakdown  Description: 1.  Monitor for areas of redness and/or skin breakdown  2.  Assess vascular access sites hourly  3.  Every 4-6 hours minimum:  Change oxygen saturation probe site  4.  Every 4-6 hours:  If on nasal continuous positive airway pressure, respiratory therapy assess nares and determine need for appliance change or resting period.  5/25/2024 2023 by Ramone Mondragon RN  Outcome: Adequate for Discharge  5/25/2024 1829 by Penelope Novak RN  Outcome: Progressing     Problem: Safety - Adult  Goal: Free from fall injury  5/25/2024 2023 by Ramone Mondragon RN  Outcome: Adequate for Discharge  5/25/2024 1829 by Penelope Novak RN  Outcome: Progressing  5/25/2024 0852 by Rupa Lazo RN  Outcome: Progressing     Problem: ABCDS Injury Assessment  Goal: Absence of physical  injury  5/25/2024 2023 by Ramone Mondrgaon, RN  Outcome: Adequate for Discharge  5/25/2024 1829 by Penelope Novak, RN  Outcome: Progressing  Flowsheets (Taken 5/25/2024 1006)  Absence of Physical Injury: Implement safety measures based on patient assessment

## 2024-05-28 ENCOUNTER — OUTSIDE SERVICES (OUTPATIENT)
Dept: INFECTIOUS DISEASES | Age: 54
End: 2024-05-28
Payer: COMMERCIAL

## 2024-05-28 ENCOUNTER — HOSPITAL ENCOUNTER (OUTPATIENT)
Age: 54
Setting detail: SPECIMEN
Discharge: HOME | End: 2024-05-28

## 2024-05-28 DIAGNOSIS — I89.0 LYMPHEDEMA OF BOTH LOWER EXTREMITIES: ICD-10-CM

## 2024-05-28 DIAGNOSIS — E66.01 MORBID OBESITY (HCC): ICD-10-CM

## 2024-05-28 DIAGNOSIS — L03.115 CELLULITIS OF RIGHT LOWER EXTREMITY: Primary | ICD-10-CM

## 2024-05-28 PROCEDURE — 99305 1ST NF CARE MODERATE MDM 35: CPT | Performed by: NURSE PRACTITIONER

## 2024-05-29 VITALS
RESPIRATION RATE: 18 BRPM | SYSTOLIC BLOOD PRESSURE: 189 MMHG | HEART RATE: 62 BPM | TEMPERATURE: 97.7 F | DIASTOLIC BLOOD PRESSURE: 90 MMHG | OXYGEN SATURATION: 96 %

## 2024-05-29 LAB
ALBUMIN SERPL-MCNC: 3.6 G/DL (ref 3.5–5.2)
ALBUMIN/GLOB SERPL: 1 {RATIO} (ref 1–2.5)
ALP SERPL-CCNC: 78 U/L (ref 40–129)
ALT SERPL-CCNC: <5 U/L (ref 10–50)
AST SERPL-CCNC: 20 U/L (ref 10–50)
BASOPHILS # BLD: 0.07 K/UL (ref 0–0.2)
BASOPHILS NFR BLD: 1 % (ref 0–2)
BILIRUB DIRECT SERPL-MCNC: <0.2 MG/DL (ref 0–0.3)
BILIRUB INDIRECT SERPL-MCNC: ABNORMAL MG/DL (ref 0–1)
BILIRUB SERPL-MCNC: 0.2 MG/DL (ref 0–1.2)
EOSINOPHIL # BLD: 0.33 K/UL (ref 0–0.44)
EOSINOPHILS RELATIVE PERCENT: 5 % (ref 1–4)
ERYTHROCYTE [DISTWIDTH] IN BLOOD BY AUTOMATED COUNT: 14 % (ref 11.8–14.4)
GLOBULIN SER CALC-MCNC: 3.6 G/DL
HCT VFR BLD AUTO: 39 % (ref 40.7–50.3)
HGB BLD-MCNC: 11.9 G/DL (ref 13–17)
IMM GRANULOCYTES # BLD AUTO: 0.06 K/UL (ref 0–0.3)
IMM GRANULOCYTES NFR BLD: 1 %
LYMPHOCYTES NFR BLD: 1.01 K/UL (ref 1.1–3.7)
LYMPHOCYTES RELATIVE PERCENT: 14 % (ref 24–43)
MCH RBC QN AUTO: 30.5 PG (ref 25.2–33.5)
MCHC RBC AUTO-ENTMCNC: 30.5 G/DL (ref 28.4–34.8)
MCV RBC AUTO: 100 FL (ref 82.6–102.9)
MONOCYTES NFR BLD: 0.76 K/UL (ref 0.1–1.2)
MONOCYTES NFR BLD: 11 % (ref 3–12)
NEUTROPHILS NFR BLD: 68 % (ref 36–65)
NEUTS SEG NFR BLD: 4.93 K/UL (ref 1.5–8.1)
NRBC BLD-RTO: 0 PER 100 WBC
PLATELET # BLD AUTO: 211 K/UL (ref 138–453)
PMV BLD AUTO: 10.4 FL (ref 8.1–13.5)
PROT SERPL-MCNC: 7.2 G/DL (ref 6.6–8.7)
RBC # BLD AUTO: 3.9 M/UL (ref 4.21–5.77)
WBC OTHER # BLD: 7.2 K/UL (ref 3.5–11.3)

## 2024-05-29 PROCEDURE — P9603 ONE-WAY ALLOW PRORATED MILES: HCPCS

## 2024-05-29 PROCEDURE — 36415 COLL VENOUS BLD VENIPUNCTURE: CPT

## 2024-05-29 PROCEDURE — 80076 HEPATIC FUNCTION PANEL: CPT

## 2024-05-29 PROCEDURE — 85025 COMPLETE CBC W/AUTO DIFF WBC: CPT

## 2024-05-30 ENCOUNTER — OUTSIDE SERVICES (OUTPATIENT)
Dept: INFECTIOUS DISEASES | Age: 54
End: 2024-05-30
Payer: COMMERCIAL

## 2024-05-30 ENCOUNTER — HOSPITAL ENCOUNTER (OUTPATIENT)
Age: 54
Setting detail: SPECIMEN
Discharge: HOME | End: 2024-05-30

## 2024-05-30 VITALS
TEMPERATURE: 98 F | RESPIRATION RATE: 19 BRPM | DIASTOLIC BLOOD PRESSURE: 96 MMHG | SYSTOLIC BLOOD PRESSURE: 160 MMHG | OXYGEN SATURATION: 94 % | HEART RATE: 71 BPM

## 2024-05-30 DIAGNOSIS — I89.0 LYMPHEDEMA OF BOTH LOWER EXTREMITIES: ICD-10-CM

## 2024-05-30 DIAGNOSIS — E66.01 MORBID OBESITY (HCC): ICD-10-CM

## 2024-05-30 DIAGNOSIS — L03.115 CELLULITIS OF RIGHT LOWER EXTREMITY: Primary | ICD-10-CM

## 2024-05-30 LAB
ANION GAP SERPL CALCULATED.3IONS-SCNC: 11 MMOL/L (ref 9–16)
BUN SERPL-MCNC: 32 MG/DL (ref 6–20)
CALCIUM SERPL-MCNC: 8.5 MG/DL (ref 8.6–10.4)
CHLORIDE SERPL-SCNC: 105 MMOL/L (ref 98–107)
CO2 SERPL-SCNC: 25 MMOL/L (ref 20–31)
CREAT SERPL-MCNC: 2.2 MG/DL (ref 0.7–1.2)
CRP SERPL HS-MCNC: 31.7 MG/L (ref 0–5)
ERYTHROCYTE [DISTWIDTH] IN BLOOD BY AUTOMATED COUNT: 14 % (ref 11.8–14.4)
GFR, ESTIMATED: 36 ML/MIN/1.73M2
GLUCOSE SERPL-MCNC: 145 MG/DL (ref 74–99)
HCT VFR BLD AUTO: 37.1 % (ref 40.7–50.3)
HGB BLD-MCNC: 11.2 G/DL (ref 13–17)
MCH RBC QN AUTO: 30.3 PG (ref 25.2–33.5)
MCHC RBC AUTO-ENTMCNC: 30.2 G/DL (ref 28.4–34.8)
MCV RBC AUTO: 100.3 FL (ref 82.6–102.9)
NRBC BLD-RTO: 0 PER 100 WBC
PLATELET # BLD AUTO: 197 K/UL (ref 138–453)
PMV BLD AUTO: 10.7 FL (ref 8.1–13.5)
POTASSIUM SERPL-SCNC: 4.2 MMOL/L (ref 3.7–5.3)
RBC # BLD AUTO: 3.7 M/UL (ref 4.21–5.77)
SODIUM SERPL-SCNC: 141 MMOL/L (ref 136–145)
WBC OTHER # BLD: 7 K/UL (ref 3.5–11.3)

## 2024-05-30 PROCEDURE — 99309 SBSQ NF CARE MODERATE MDM 30: CPT | Performed by: NURSE PRACTITIONER

## 2024-05-30 PROCEDURE — 85027 COMPLETE CBC AUTOMATED: CPT

## 2024-05-30 PROCEDURE — 80048 BASIC METABOLIC PNL TOTAL CA: CPT

## 2024-05-30 PROCEDURE — 86140 C-REACTIVE PROTEIN: CPT

## 2024-05-30 PROCEDURE — P9603 ONE-WAY ALLOW PRORATED MILES: HCPCS

## 2024-05-30 NOTE — PROGRESS NOTES
[facility] -   Infectious diseases evaluation  admission date 5/25/2024    reason for consultation:   Antibiotic Management    Impression :   Current:  Cellulitis R thigh lymphedema pannus  Bilateral lower extremity lymphedema  Morbid Obesity    Other:    Discussion / summary of stay / plan of care/ Recommendations:   RLE and right thigh redness and edema are extensive - will take a long time to show resolution despite the correct AB   HENCE:   Ceftriaxone 2 gm IV daily to complete a 21 day course through 6/5/24.  Follow CBC and renal function.  Compression RLE.  Supportive care.  Discussed with patient, RN.    Infection Control Recommendations   Keavy Precautions    Antimicrobial Stewardship Recommendations   Simplification of therapy  Targeted therapy    History of Present Illness:   Initial history:  Stevo Avilez is a 53 y.o.-year-old male with a history of lymphedema obesity A-fib comes in because of frequent falls and inability to walk lower extremity lymphedema has gotten worse, creatinine 3.5 with a baseline of 1.8  Was noticed to have a right lower extremity cellulitis and admitted to the hospital, infectious consulted  RUE PICC placed.  Discharged to [facility].    Interval changes  5/28/2024  Very pleasant gentleman, resting in bed in no distress.  States he is feeling better.  Denies any pain, fever, chills, n/v/d.  RUE PICC site clean.  BLE with dry scaly skin.  No open sores, erythema.      Summary of relevant labs:  Labs:    Micro:      Procedures      Cardiology      Imaging:      I have personally reviewed the past medical history, past surgical history, medications, social history, and family history, and I haveupdated the database accordingly.      Allergies:   Patient has no known allergies.     Review of Systems:     Review of Systems   All other systems reviewed and are negative.      Physical Examination :       Physical Exam  Vitals and

## 2024-05-30 NOTE — PROGRESS NOTES
negative.      Physical Examination :       Physical Exam  Vitals and nursing note reviewed.   Constitutional:       Appearance: He is obese.   HENT:      Head: Normocephalic and atraumatic.      Right Ear: External ear normal.      Left Ear: External ear normal.      Nose: Nose normal.      Mouth/Throat:      Mouth: Mucous membranes are moist.      Pharynx: Oropharynx is clear.   Eyes:      Extraocular Movements: Extraocular movements intact.      Pupils: Pupils are equal, round, and reactive to light.   Cardiovascular:      Rate and Rhythm: Normal rate and regular rhythm.      Heart sounds: Normal heart sounds. No murmur heard.  Pulmonary:      Effort: Pulmonary effort is normal.      Breath sounds: Normal breath sounds.      Comments: RA  Abdominal:      General: Bowel sounds are normal. There is no distension.      Palpations: Abdomen is soft.      Tenderness: There is no abdominal tenderness.   Genitourinary:     Comments: No estevez.  Musculoskeletal:      Cervical back: Neck supple.      Right lower leg: Edema present.      Left lower leg: Edema present.      Comments: BLE Lymphedema.   Skin:     General: Skin is warm and dry.      Capillary Refill: Capillary refill takes less than 2 seconds.      Findings: Erythema present.      Comments: BLE with dry scaly skin.No erythema noted to BLE.  Pannus pink.  Not cellulitic looking.   Neurological:      Mental Status: He is alert and oriented to person, place, and time.   Psychiatric:         Mood and Affect: Mood normal.         Behavior: Behavior normal.         Past Medical History:     Past Medical History:   Diagnosis Date    Bipolar 1 disorder (HCC)     Hypertension        Past Surgical  History:   No past surgical history on file.    Medications:       Social History:     Social History     Socioeconomic History    Marital status:      Spouse name: Not on file    Number of children: Not on file    Years of education: Not on file    Highest education

## 2024-06-04 ENCOUNTER — OUTSIDE SERVICES (OUTPATIENT)
Dept: INFECTIOUS DISEASES | Age: 54
End: 2024-06-04
Payer: COMMERCIAL

## 2024-06-04 VITALS
SYSTOLIC BLOOD PRESSURE: 158 MMHG | OXYGEN SATURATION: 97 % | TEMPERATURE: 97.9 F | RESPIRATION RATE: 16 BRPM | HEART RATE: 70 BPM | DIASTOLIC BLOOD PRESSURE: 92 MMHG

## 2024-06-04 DIAGNOSIS — I89.0 LYMPHEDEMA OF BOTH LOWER EXTREMITIES: ICD-10-CM

## 2024-06-04 DIAGNOSIS — E66.01 MORBID OBESITY (HCC): ICD-10-CM

## 2024-06-04 DIAGNOSIS — L03.115 CELLULITIS OF RIGHT LOWER EXTREMITY: Primary | ICD-10-CM

## 2024-06-04 DIAGNOSIS — R79.82 ELEVATED C-REACTIVE PROTEIN (CRP): ICD-10-CM

## 2024-06-04 PROCEDURE — 99309 SBSQ NF CARE MODERATE MDM 30: CPT | Performed by: NURSE PRACTITIONER

## 2024-06-04 NOTE — PROGRESS NOTES
has no known allergies.     Review of Systems:     Review of Systems   All other systems reviewed and are negative.      Physical Examination :       Physical Exam  Vitals and nursing note reviewed.   Constitutional:       Appearance: He is obese.   HENT:      Head: Normocephalic and atraumatic.      Right Ear: External ear normal.      Left Ear: External ear normal.      Nose: Nose normal.      Mouth/Throat:      Mouth: Mucous membranes are moist.      Pharynx: Oropharynx is clear.   Eyes:      Extraocular Movements: Extraocular movements intact.      Pupils: Pupils are equal, round, and reactive to light.   Cardiovascular:      Rate and Rhythm: Normal rate and regular rhythm.      Heart sounds: Normal heart sounds. No murmur heard.  Pulmonary:      Effort: Pulmonary effort is normal.      Breath sounds: Normal breath sounds.      Comments: RA  Abdominal:      General: Bowel sounds are normal. There is no distension.      Palpations: Abdomen is soft.      Tenderness: There is no abdominal tenderness.   Genitourinary:     Comments: No estevez.  Musculoskeletal:      Cervical back: Neck supple.      Right lower leg: Edema present.      Left lower leg: Edema present.      Comments: BLE Lymphedema.   Skin:     General: Skin is warm and dry.      Capillary Refill: Capillary refill takes less than 2 seconds.      Findings: Erythema present.      Comments: BLE with dry scaly skin.No erythema noted to BLE.  Pannus pink.  Not cellulitic looking.   Neurological:      Mental Status: He is alert and oriented to person, place, and time.   Psychiatric:         Mood and Affect: Mood normal.         Behavior: Behavior normal.         Past Medical History:     Past Medical History:   Diagnosis Date    Bipolar 1 disorder (HCC)     Hypertension        Past Surgical  History:   No past surgical history on file.    Medications:       Social History:     Social History     Socioeconomic History    Marital status:      Spouse name:

## 2024-06-06 ENCOUNTER — HOSPITAL ENCOUNTER (OUTPATIENT)
Age: 54
Setting detail: SPECIMEN
Discharge: HOME | End: 2024-06-06

## 2024-06-06 ENCOUNTER — OUTSIDE SERVICES (OUTPATIENT)
Dept: SURGERY | Age: 54
End: 2024-06-06

## 2024-06-06 DIAGNOSIS — E66.01 MORBID OBESITY (HCC): ICD-10-CM

## 2024-06-06 DIAGNOSIS — R79.82 ELEVATED C-REACTIVE PROTEIN (CRP): ICD-10-CM

## 2024-06-06 DIAGNOSIS — L03.115 CELLULITIS OF RIGHT LOWER EXTREMITY: Primary | ICD-10-CM

## 2024-06-06 LAB
ANION GAP SERPL CALCULATED.3IONS-SCNC: 11 MMOL/L (ref 9–16)
BUN SERPL-MCNC: 32 MG/DL (ref 6–20)
CALCIUM SERPL-MCNC: 8.8 MG/DL (ref 8.6–10.4)
CHLORIDE SERPL-SCNC: 104 MMOL/L (ref 98–107)
CO2 SERPL-SCNC: 26 MMOL/L (ref 20–31)
CREAT SERPL-MCNC: 2.1 MG/DL (ref 0.7–1.2)
CRP SERPL HS-MCNC: 14.9 MG/L (ref 0–5)
ERYTHROCYTE [DISTWIDTH] IN BLOOD BY AUTOMATED COUNT: 13.9 % (ref 11.8–14.4)
GFR, ESTIMATED: 37 ML/MIN/1.73M2
GLUCOSE SERPL-MCNC: 150 MG/DL (ref 74–99)
HCT VFR BLD AUTO: 36.8 % (ref 40.7–50.3)
HGB BLD-MCNC: 11.2 G/DL (ref 13–17)
MCH RBC QN AUTO: 29.9 PG (ref 25.2–33.5)
MCHC RBC AUTO-ENTMCNC: 30.4 G/DL (ref 28.4–34.8)
MCV RBC AUTO: 98.4 FL (ref 82.6–102.9)
NRBC BLD-RTO: 0 PER 100 WBC
PLATELET # BLD AUTO: 196 K/UL (ref 138–453)
PMV BLD AUTO: 11 FL (ref 8.1–13.5)
POTASSIUM SERPL-SCNC: 5.2 MMOL/L (ref 3.7–5.3)
RBC # BLD AUTO: 3.74 M/UL (ref 4.21–5.77)
SODIUM SERPL-SCNC: 141 MMOL/L (ref 136–145)
WBC OTHER # BLD: 10.6 K/UL (ref 3.5–11.3)

## 2024-06-06 PROCEDURE — P9603 ONE-WAY ALLOW PRORATED MILES: HCPCS

## 2024-06-06 PROCEDURE — 86140 C-REACTIVE PROTEIN: CPT

## 2024-06-06 PROCEDURE — 80048 BASIC METABOLIC PNL TOTAL CA: CPT

## 2024-06-06 PROCEDURE — 85027 COMPLETE CBC AUTOMATED: CPT

## 2024-06-06 PROCEDURE — 36415 COLL VENOUS BLD VENIPUNCTURE: CPT

## 2024-06-09 VITALS
DIASTOLIC BLOOD PRESSURE: 100 MMHG | HEART RATE: 78 BPM | OXYGEN SATURATION: 96 % | SYSTOLIC BLOOD PRESSURE: 177 MMHG | TEMPERATURE: 97.6 F | RESPIRATION RATE: 18 BRPM

## 2024-06-13 ENCOUNTER — HOSPITAL ENCOUNTER (OUTPATIENT)
Age: 54
Setting detail: SPECIMEN
Discharge: HOME | End: 2024-06-13
Payer: COMMERCIAL

## 2024-06-13 LAB
ANION GAP SERPL CALCULATED.3IONS-SCNC: 9 MMOL/L (ref 9–16)
BUN SERPL-MCNC: 44 MG/DL (ref 6–20)
CALCIUM SERPL-MCNC: 8.7 MG/DL (ref 8.6–10.4)
CHLORIDE SERPL-SCNC: 107 MMOL/L (ref 98–107)
CO2 SERPL-SCNC: 28 MMOL/L (ref 20–31)
CREAT SERPL-MCNC: 2.2 MG/DL (ref 0.7–1.2)
CRP SERPL HS-MCNC: 14.1 MG/L (ref 0–5)
ERYTHROCYTE [DISTWIDTH] IN BLOOD BY AUTOMATED COUNT: 14.6 % (ref 11.8–14.4)
GFR, ESTIMATED: 36 ML/MIN/1.73M2
GLUCOSE SERPL-MCNC: 99 MG/DL (ref 74–99)
HCT VFR BLD AUTO: 38.5 % (ref 40.7–50.3)
MCH RBC QN AUTO: 30.1 PG (ref 25.2–33.5)
MCHC RBC AUTO-ENTMCNC: 30.1 G/DL (ref 28.4–34.8)
MCV RBC AUTO: 100 FL (ref 82.6–102.9)
NRBC BLD-RTO: 0 PER 100 WBC
PLATELET # BLD AUTO: 140 K/UL (ref 138–453)
PMV BLD AUTO: 10.1 FL (ref 8.1–13.5)
POTASSIUM SERPL-SCNC: 4.9 MMOL/L (ref 3.7–5.3)
RBC # BLD AUTO: 3.85 M/UL (ref 4.21–5.77)
SODIUM SERPL-SCNC: 144 MMOL/L (ref 136–145)
WBC OTHER # BLD: 10.5 K/UL (ref 3.5–11.3)

## 2024-06-13 PROCEDURE — 80048 BASIC METABOLIC PNL TOTAL CA: CPT

## 2024-06-13 PROCEDURE — 36415 COLL VENOUS BLD VENIPUNCTURE: CPT

## 2024-06-13 PROCEDURE — P9603 ONE-WAY ALLOW PRORATED MILES: HCPCS

## 2024-06-13 PROCEDURE — 86140 C-REACTIVE PROTEIN: CPT

## 2024-06-13 PROCEDURE — 85027 COMPLETE CBC AUTOMATED: CPT
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
## 2024-09-09 ENCOUNTER — HOSPITAL ENCOUNTER (INPATIENT)
Age: 54
LOS: 11 days | Discharge: NURSING, REHAB OR LONG-TERM CARE | End: 2024-09-20
Attending: EMERGENCY MEDICINE | Admitting: STUDENT IN AN ORGANIZED HEALTH CARE EDUCATION/TRAINING PROGRAM
Payer: COMMERCIAL

## 2024-09-09 ENCOUNTER — APPOINTMENT (OUTPATIENT)
Dept: GENERAL RADIOLOGY | Age: 54
End: 2024-09-09
Payer: COMMERCIAL

## 2024-09-09 DIAGNOSIS — N17.9 AKI (ACUTE KIDNEY INJURY) (HCC): ICD-10-CM

## 2024-09-09 DIAGNOSIS — E87.6 HYPOKALEMIA: ICD-10-CM

## 2024-09-09 DIAGNOSIS — I89.0 LYMPHEDEMA: ICD-10-CM

## 2024-09-09 DIAGNOSIS — R60.0 BILATERAL LOWER EXTREMITY EDEMA: ICD-10-CM

## 2024-09-09 DIAGNOSIS — L02.419 CELLULITIS AND ABSCESS OF LEG: Primary | ICD-10-CM

## 2024-09-09 DIAGNOSIS — L03.119 CELLULITIS AND ABSCESS OF LEG: Primary | ICD-10-CM

## 2024-09-09 DIAGNOSIS — E66.9 OBESITY (BMI 35.0-39.9 WITHOUT COMORBIDITY): ICD-10-CM

## 2024-09-09 DIAGNOSIS — E66.2 EXTREME OBESITY WITH ALVEOLAR HYPOVENTILATION: ICD-10-CM

## 2024-09-09 PROBLEM — N50.89 SCROTAL EDEMA: Status: ACTIVE | Noted: 2024-09-09

## 2024-09-09 LAB
ALBUMIN SERPL-MCNC: 4 G/DL (ref 3.5–5.2)
ALBUMIN/GLOB SERPL: 1 {RATIO} (ref 1–2.5)
ALP SERPL-CCNC: 64 U/L (ref 40–129)
ALT SERPL-CCNC: 17 U/L (ref 10–50)
ANION GAP SERPL CALCULATED.3IONS-SCNC: 12 MMOL/L (ref 9–16)
AST SERPL-CCNC: 17 U/L (ref 10–50)
BASOPHILS # BLD: 0.03 K/UL (ref 0–0.2)
BASOPHILS NFR BLD: 0 % (ref 0–2)
BILIRUB SERPL-MCNC: 0.3 MG/DL (ref 0–1.2)
BNP SERPL-MCNC: 374 PG/ML (ref 0–300)
BUN SERPL-MCNC: 80 MG/DL (ref 6–20)
CALCIUM SERPL-MCNC: 9 MG/DL (ref 8.6–10.4)
CHLORIDE SERPL-SCNC: 106 MMOL/L (ref 98–107)
CO2 SERPL-SCNC: 20 MMOL/L (ref 20–31)
CREAT SERPL-MCNC: 3.6 MG/DL (ref 0.7–1.2)
EOSINOPHIL # BLD: 0.2 K/UL (ref 0–0.44)
EOSINOPHILS RELATIVE PERCENT: 2 % (ref 1–4)
ERYTHROCYTE [DISTWIDTH] IN BLOOD BY AUTOMATED COUNT: 15.2 % (ref 11.8–14.4)
GFR, ESTIMATED: 19 ML/MIN/1.73M2
GLUCOSE SERPL-MCNC: 116 MG/DL (ref 74–99)
HCT VFR BLD AUTO: 39.1 % (ref 40.7–50.3)
HGB BLD-MCNC: 11.8 G/DL (ref 13–17)
IMM GRANULOCYTES # BLD AUTO: 0.06 K/UL (ref 0–0.3)
IMM GRANULOCYTES NFR BLD: 1 %
LACTIC ACID, WHOLE BLOOD: 0.8 MMOL/L (ref 0.7–2.1)
LYMPHOCYTES NFR BLD: 0.88 K/UL (ref 1.1–3.7)
LYMPHOCYTES RELATIVE PERCENT: 8 % (ref 24–43)
MCH RBC QN AUTO: 29.4 PG (ref 25.2–33.5)
MCHC RBC AUTO-ENTMCNC: 30.2 G/DL (ref 28.4–34.8)
MCV RBC AUTO: 97.3 FL (ref 82.6–102.9)
MONOCYTES NFR BLD: 0.71 K/UL (ref 0.1–1.2)
MONOCYTES NFR BLD: 7 % (ref 3–12)
NEUTROPHILS NFR BLD: 82 % (ref 36–65)
NEUTS SEG NFR BLD: 8.97 K/UL (ref 1.5–8.1)
NRBC BLD-RTO: 0 PER 100 WBC
PLATELET # BLD AUTO: 220 K/UL (ref 138–453)
PMV BLD AUTO: 10.1 FL (ref 8.1–13.5)
POTASSIUM SERPL-SCNC: 5 MMOL/L (ref 3.7–5.3)
PROT SERPL-MCNC: 7.9 G/DL (ref 6.6–8.7)
RBC # BLD AUTO: 4.02 M/UL (ref 4.21–5.77)
RBC # BLD: ABNORMAL 10*6/UL
SODIUM SERPL-SCNC: 138 MMOL/L (ref 136–145)
TROPONIN I SERPL HS-MCNC: 61 NG/L (ref 0–22)
TROPONIN I SERPL HS-MCNC: 61 NG/L (ref 0–22)
WBC OTHER # BLD: 10.9 K/UL (ref 3.5–11.3)

## 2024-09-09 PROCEDURE — 85025 COMPLETE CBC W/AUTO DIFF WBC: CPT

## 2024-09-09 PROCEDURE — 6360000002 HC RX W HCPCS

## 2024-09-09 PROCEDURE — 6360000002 HC RX W HCPCS: Performed by: STUDENT IN AN ORGANIZED HEALTH CARE EDUCATION/TRAINING PROGRAM

## 2024-09-09 PROCEDURE — 2580000003 HC RX 258: Performed by: PHYSICIAN ASSISTANT

## 2024-09-09 PROCEDURE — 87040 BLOOD CULTURE FOR BACTERIA: CPT

## 2024-09-09 PROCEDURE — 2580000003 HC RX 258

## 2024-09-09 PROCEDURE — 99285 EMERGENCY DEPT VISIT HI MDM: CPT

## 2024-09-09 PROCEDURE — 93005 ELECTROCARDIOGRAM TRACING: CPT

## 2024-09-09 PROCEDURE — 1200000000 HC SEMI PRIVATE

## 2024-09-09 PROCEDURE — 83880 ASSAY OF NATRIURETIC PEPTIDE: CPT

## 2024-09-09 PROCEDURE — 6370000000 HC RX 637 (ALT 250 FOR IP): Performed by: STUDENT IN AN ORGANIZED HEALTH CARE EDUCATION/TRAINING PROGRAM

## 2024-09-09 PROCEDURE — 99223 1ST HOSP IP/OBS HIGH 75: CPT | Performed by: STUDENT IN AN ORGANIZED HEALTH CARE EDUCATION/TRAINING PROGRAM

## 2024-09-09 PROCEDURE — 6370000000 HC RX 637 (ALT 250 FOR IP)

## 2024-09-09 PROCEDURE — 71045 X-RAY EXAM CHEST 1 VIEW: CPT

## 2024-09-09 PROCEDURE — 36415 COLL VENOUS BLD VENIPUNCTURE: CPT

## 2024-09-09 PROCEDURE — 84484 ASSAY OF TROPONIN QUANT: CPT

## 2024-09-09 PROCEDURE — 80053 COMPREHEN METABOLIC PANEL: CPT

## 2024-09-09 PROCEDURE — 83605 ASSAY OF LACTIC ACID: CPT

## 2024-09-09 RX ORDER — ARIPIPRAZOLE 15 MG/1
15 TABLET ORAL DAILY
Status: DISCONTINUED | OUTPATIENT
Start: 2024-09-10 | End: 2024-09-20 | Stop reason: HOSPADM

## 2024-09-09 RX ORDER — SODIUM CHLORIDE 0.9 % (FLUSH) 0.9 %
5-40 SYRINGE (ML) INJECTION PRN
Status: DISCONTINUED | OUTPATIENT
Start: 2024-09-09 | End: 2024-09-20 | Stop reason: HOSPADM

## 2024-09-09 RX ORDER — FERROUS SULFATE 325(65) MG
325 TABLET, DELAYED RELEASE (ENTERIC COATED) ORAL 2 TIMES DAILY
Status: DISCONTINUED | OUTPATIENT
Start: 2024-09-09 | End: 2024-09-20 | Stop reason: HOSPADM

## 2024-09-09 RX ORDER — 0.9 % SODIUM CHLORIDE 0.9 %
1000 INTRAVENOUS SOLUTION INTRAVENOUS ONCE
Status: COMPLETED | OUTPATIENT
Start: 2024-09-09 | End: 2024-09-09

## 2024-09-09 RX ORDER — SPIRONOLACTONE 25 MG/1
25 TABLET ORAL DAILY
Status: CANCELLED | OUTPATIENT
Start: 2024-09-10

## 2024-09-09 RX ORDER — POTASSIUM CHLORIDE 1500 MG/1
40 TABLET, EXTENDED RELEASE ORAL PRN
Status: DISCONTINUED | OUTPATIENT
Start: 2024-09-09 | End: 2024-09-09

## 2024-09-09 RX ORDER — ACETAMINOPHEN 325 MG/1
650 TABLET ORAL EVERY 6 HOURS PRN
Status: DISCONTINUED | OUTPATIENT
Start: 2024-09-09 | End: 2024-09-20 | Stop reason: HOSPADM

## 2024-09-09 RX ORDER — ONDANSETRON 2 MG/ML
4 INJECTION INTRAMUSCULAR; INTRAVENOUS EVERY 6 HOURS PRN
Status: DISCONTINUED | OUTPATIENT
Start: 2024-09-09 | End: 2024-09-20 | Stop reason: HOSPADM

## 2024-09-09 RX ORDER — POTASSIUM CHLORIDE 7.45 MG/ML
10 INJECTION INTRAVENOUS PRN
Status: DISCONTINUED | OUTPATIENT
Start: 2024-09-09 | End: 2024-09-09

## 2024-09-09 RX ORDER — ACETAMINOPHEN 325 MG/1
650 TABLET ORAL ONCE
Status: COMPLETED | OUTPATIENT
Start: 2024-09-09 | End: 2024-09-09

## 2024-09-09 RX ORDER — POLYETHYLENE GLYCOL 3350 17 G/17G
17 POWDER, FOR SOLUTION ORAL DAILY PRN
Status: DISCONTINUED | OUTPATIENT
Start: 2024-09-09 | End: 2024-09-20 | Stop reason: HOSPADM

## 2024-09-09 RX ORDER — ONDANSETRON 4 MG/1
4 TABLET, ORALLY DISINTEGRATING ORAL EVERY 8 HOURS PRN
Status: DISCONTINUED | OUTPATIENT
Start: 2024-09-09 | End: 2024-09-20 | Stop reason: HOSPADM

## 2024-09-09 RX ORDER — SODIUM CHLORIDE 0.9 % (FLUSH) 0.9 %
5-40 SYRINGE (ML) INJECTION EVERY 12 HOURS SCHEDULED
Status: DISCONTINUED | OUTPATIENT
Start: 2024-09-09 | End: 2024-09-20 | Stop reason: HOSPADM

## 2024-09-09 RX ORDER — ACETAMINOPHEN 650 MG/1
650 SUPPOSITORY RECTAL EVERY 6 HOURS PRN
Status: DISCONTINUED | OUTPATIENT
Start: 2024-09-09 | End: 2024-09-20 | Stop reason: HOSPADM

## 2024-09-09 RX ORDER — MAGNESIUM SULFATE IN WATER 40 MG/ML
2000 INJECTION, SOLUTION INTRAVENOUS PRN
Status: DISCONTINUED | OUTPATIENT
Start: 2024-09-09 | End: 2024-09-09

## 2024-09-09 RX ORDER — SODIUM CHLORIDE 9 MG/ML
INJECTION, SOLUTION INTRAVENOUS PRN
Status: DISCONTINUED | OUTPATIENT
Start: 2024-09-09 | End: 2024-09-20 | Stop reason: HOSPADM

## 2024-09-09 RX ADMIN — ACETAMINOPHEN 650 MG: 325 TABLET ORAL at 16:02

## 2024-09-09 RX ADMIN — Medication 2000 MG: at 19:23

## 2024-09-09 RX ADMIN — SODIUM CHLORIDE, PRESERVATIVE FREE 10 ML: 5 INJECTION INTRAVENOUS at 23:26

## 2024-09-09 RX ADMIN — Medication 2000 MG: at 23:24

## 2024-09-09 RX ADMIN — SODIUM CHLORIDE 1000 ML: 9 INJECTION, SOLUTION INTRAVENOUS at 19:24

## 2024-09-09 RX ADMIN — FERROUS SULFATE TAB EC 325 MG (65 MG FE EQUIVALENT) 325 MG: 325 (65 FE) TABLET DELAYED RESPONSE at 23:24

## 2024-09-09 RX ADMIN — SODIUM CHLORIDE, PRESERVATIVE FREE 10 ML: 5 INJECTION INTRAVENOUS at 23:25

## 2024-09-09 ASSESSMENT — LIFESTYLE VARIABLES
HOW OFTEN DO YOU HAVE A DRINK CONTAINING ALCOHOL: PATIENT DECLINED
HOW MANY STANDARD DRINKS CONTAINING ALCOHOL DO YOU HAVE ON A TYPICAL DAY: PATIENT DECLINED

## 2024-09-09 ASSESSMENT — PAIN SCALES - GENERAL
PAINLEVEL_OUTOF10: 4
PAINLEVEL_OUTOF10: 9

## 2024-09-09 ASSESSMENT — PAIN - FUNCTIONAL ASSESSMENT: PAIN_FUNCTIONAL_ASSESSMENT: 0-10

## 2024-09-10 ENCOUNTER — APPOINTMENT (OUTPATIENT)
Dept: VASCULAR LAB | Age: 54
End: 2024-09-10
Attending: STUDENT IN AN ORGANIZED HEALTH CARE EDUCATION/TRAINING PROGRAM
Payer: COMMERCIAL

## 2024-09-10 PROBLEM — L03.119 CELLULITIS AND ABSCESS OF LEG: Status: ACTIVE | Noted: 2024-09-10

## 2024-09-10 PROBLEM — E66.2 EXTREME OBESITY WITH ALVEOLAR HYPOVENTILATION: Status: ACTIVE | Noted: 2024-09-10

## 2024-09-10 PROBLEM — L02.419 CELLULITIS AND ABSCESS OF LEG: Status: ACTIVE | Noted: 2024-09-10

## 2024-09-10 LAB
ANION GAP SERPL CALCULATED.3IONS-SCNC: 10 MMOL/L (ref 9–16)
BASOPHILS # BLD: <0.03 K/UL (ref 0–0.2)
BASOPHILS NFR BLD: 0 % (ref 0–2)
BUN SERPL-MCNC: 76 MG/DL (ref 6–20)
CALCIUM SERPL-MCNC: 8.3 MG/DL (ref 8.6–10.4)
CHLORIDE SERPL-SCNC: 110 MMOL/L (ref 98–107)
CO2 SERPL-SCNC: 19 MMOL/L (ref 20–31)
CREAT SERPL-MCNC: 3.5 MG/DL (ref 0.7–1.2)
CREAT UR-MCNC: 112 MG/DL (ref 39–259)
ECHO BSA: 3.25 M2
EKG ATRIAL RATE: 69 BPM
EKG P AXIS: 71 DEGREES
EKG P-R INTERVAL: 230 MS
EKG Q-T INTERVAL: 398 MS
EKG QRS DURATION: 112 MS
EKG QTC CALCULATION (BAZETT): 426 MS
EKG R AXIS: -57 DEGREES
EKG T AXIS: 53 DEGREES
EKG VENTRICULAR RATE: 69 BPM
EOSINOPHIL # BLD: 0.14 K/UL (ref 0–0.44)
EOSINOPHILS RELATIVE PERCENT: 2 % (ref 1–4)
ERYTHROCYTE [DISTWIDTH] IN BLOOD BY AUTOMATED COUNT: 15.3 % (ref 11.8–14.4)
GFR, ESTIMATED: 20 ML/MIN/1.73M2
GLUCOSE SERPL-MCNC: 137 MG/DL (ref 74–99)
HCT VFR BLD AUTO: 34.6 % (ref 40.7–50.3)
HGB BLD-MCNC: 10.3 G/DL (ref 13–17)
IMM GRANULOCYTES # BLD AUTO: 0.03 K/UL (ref 0–0.3)
IMM GRANULOCYTES NFR BLD: 0 %
LYMPHOCYTES NFR BLD: 0.73 K/UL (ref 1.1–3.7)
LYMPHOCYTES RELATIVE PERCENT: 10 % (ref 24–43)
MCH RBC QN AUTO: 29.2 PG (ref 25.2–33.5)
MCHC RBC AUTO-ENTMCNC: 29.8 G/DL (ref 28.4–34.8)
MCV RBC AUTO: 98 FL (ref 82.6–102.9)
MONOCYTES NFR BLD: 0.51 K/UL (ref 0.1–1.2)
MONOCYTES NFR BLD: 7 % (ref 3–12)
NEUTROPHILS NFR BLD: 81 % (ref 36–65)
NEUTS SEG NFR BLD: 6.01 K/UL (ref 1.5–8.1)
NRBC BLD-RTO: 0 PER 100 WBC
PLATELET # BLD AUTO: 181 K/UL (ref 138–453)
PMV BLD AUTO: 10.1 FL (ref 8.1–13.5)
POTASSIUM SERPL-SCNC: 4.8 MMOL/L (ref 3.7–5.3)
RBC # BLD AUTO: 3.53 M/UL (ref 4.21–5.77)
RBC # BLD: ABNORMAL 10*6/UL
SODIUM SERPL-SCNC: 139 MMOL/L (ref 136–145)
SODIUM UR-SCNC: 25 MMOL/L
UUN UR-MCNC: 951 MG/DL (ref 800–1666)
WBC OTHER # BLD: 7.4 K/UL (ref 3.5–11.3)

## 2024-09-10 PROCEDURE — 2580000003 HC RX 258: Performed by: INTERNAL MEDICINE

## 2024-09-10 PROCEDURE — NBSRV NON-BILLABLE SERVICE: Performed by: STUDENT IN AN ORGANIZED HEALTH CARE EDUCATION/TRAINING PROGRAM

## 2024-09-10 PROCEDURE — 99222 1ST HOSP IP/OBS MODERATE 55: CPT | Performed by: INTERNAL MEDICINE

## 2024-09-10 PROCEDURE — 85025 COMPLETE CBC W/AUTO DIFF WBC: CPT

## 2024-09-10 PROCEDURE — 51702 INSERT TEMP BLADDER CATH: CPT

## 2024-09-10 PROCEDURE — 6360000002 HC RX W HCPCS: Performed by: STUDENT IN AN ORGANIZED HEALTH CARE EDUCATION/TRAINING PROGRAM

## 2024-09-10 PROCEDURE — 99232 SBSQ HOSP IP/OBS MODERATE 35: CPT | Performed by: STUDENT IN AN ORGANIZED HEALTH CARE EDUCATION/TRAINING PROGRAM

## 2024-09-10 PROCEDURE — 93970 EXTREMITY STUDY: CPT | Performed by: SURGERY

## 2024-09-10 PROCEDURE — 82570 ASSAY OF URINE CREATININE: CPT

## 2024-09-10 PROCEDURE — 84300 ASSAY OF URINE SODIUM: CPT

## 2024-09-10 PROCEDURE — 1200000000 HC SEMI PRIVATE

## 2024-09-10 PROCEDURE — 84540 ASSAY OF URINE/UREA-N: CPT

## 2024-09-10 PROCEDURE — 93005 ELECTROCARDIOGRAM TRACING: CPT | Performed by: STUDENT IN AN ORGANIZED HEALTH CARE EDUCATION/TRAINING PROGRAM

## 2024-09-10 PROCEDURE — 80048 BASIC METABOLIC PNL TOTAL CA: CPT

## 2024-09-10 PROCEDURE — 6370000000 HC RX 637 (ALT 250 FOR IP): Performed by: STUDENT IN AN ORGANIZED HEALTH CARE EDUCATION/TRAINING PROGRAM

## 2024-09-10 PROCEDURE — 36415 COLL VENOUS BLD VENIPUNCTURE: CPT

## 2024-09-10 PROCEDURE — 93970 EXTREMITY STUDY: CPT

## 2024-09-10 PROCEDURE — 2580000003 HC RX 258: Performed by: PHYSICIAN ASSISTANT

## 2024-09-10 PROCEDURE — 99213 OFFICE O/P EST LOW 20 MIN: CPT

## 2024-09-10 PROCEDURE — 6370000000 HC RX 637 (ALT 250 FOR IP): Performed by: PHYSICIAN ASSISTANT

## 2024-09-10 RX ORDER — BUDESONIDE 3 MG/1
3 CAPSULE, COATED PELLETS ORAL DAILY
Status: DISCONTINUED | OUTPATIENT
Start: 2024-09-10 | End: 2024-09-20 | Stop reason: HOSPADM

## 2024-09-10 RX ORDER — HYDROCODONE BITARTRATE AND ACETAMINOPHEN 5; 325 MG/1; MG/1
1 TABLET ORAL ONCE
Status: DISCONTINUED | OUTPATIENT
Start: 2024-09-10 | End: 2024-09-20 | Stop reason: HOSPADM

## 2024-09-10 RX ORDER — SODIUM CHLORIDE 9 MG/ML
INJECTION, SOLUTION INTRAVENOUS CONTINUOUS
Status: DISCONTINUED | OUTPATIENT
Start: 2024-09-10 | End: 2024-09-11

## 2024-09-10 RX ADMIN — ACETAMINOPHEN 650 MG: 325 TABLET ORAL at 00:37

## 2024-09-10 RX ADMIN — ACETAMINOPHEN 650 MG: 325 TABLET ORAL at 20:12

## 2024-09-10 RX ADMIN — SODIUM CHLORIDE, PRESERVATIVE FREE 10 ML: 5 INJECTION INTRAVENOUS at 09:31

## 2024-09-10 RX ADMIN — BUDESONIDE 3 MG: 3 CAPSULE, COATED PELLETS ORAL at 12:02

## 2024-09-10 RX ADMIN — FERROUS SULFATE TAB EC 325 MG (65 MG FE EQUIVALENT) 325 MG: 325 (65 FE) TABLET DELAYED RESPONSE at 09:31

## 2024-09-10 RX ADMIN — CARVEDILOL 37.5 MG: 25 TABLET, FILM COATED ORAL at 09:31

## 2024-09-10 RX ADMIN — SODIUM CHLORIDE: 9 INJECTION, SOLUTION INTRAVENOUS at 13:45

## 2024-09-10 RX ADMIN — NIFEDIPINE 60 MG: 60 TABLET, EXTENDED RELEASE ORAL at 09:31

## 2024-09-10 RX ADMIN — APIXABAN 2.5 MG: 2.5 TABLET, FILM COATED ORAL at 20:08

## 2024-09-10 RX ADMIN — APIXABAN 2.5 MG: 2.5 TABLET, FILM COATED ORAL at 09:31

## 2024-09-10 RX ADMIN — Medication 2000 MG: at 20:08

## 2024-09-10 RX ADMIN — APIXABAN 2.5 MG: 2.5 TABLET, FILM COATED ORAL at 00:37

## 2024-09-10 RX ADMIN — FERROUS SULFATE TAB EC 325 MG (65 MG FE EQUIVALENT) 325 MG: 325 (65 FE) TABLET DELAYED RESPONSE at 20:08

## 2024-09-10 RX ADMIN — ARIPIPRAZOLE 15 MG: 15 TABLET ORAL at 09:31

## 2024-09-10 ASSESSMENT — PAIN DESCRIPTION - ORIENTATION
ORIENTATION: RIGHT;LEFT
ORIENTATION: LEFT

## 2024-09-10 ASSESSMENT — PAIN SCALES - GENERAL
PAINLEVEL_OUTOF10: 0
PAINLEVEL_OUTOF10: 4
PAINLEVEL_OUTOF10: 3
PAINLEVEL_OUTOF10: 3
PAINLEVEL_OUTOF10: 2
PAINLEVEL_OUTOF10: 7

## 2024-09-10 ASSESSMENT — PAIN DESCRIPTION - DESCRIPTORS: DESCRIPTORS: ACHING

## 2024-09-10 ASSESSMENT — PAIN DESCRIPTION - LOCATION
LOCATION: LEG
LOCATION: LEG

## 2024-09-11 PROBLEM — N18.9 ACUTE KIDNEY INJURY SUPERIMPOSED ON CKD (HCC): Status: ACTIVE | Noted: 2024-09-11

## 2024-09-11 PROBLEM — N17.9 ACUTE KIDNEY INJURY SUPERIMPOSED ON CKD (HCC): Status: ACTIVE | Noted: 2024-09-11

## 2024-09-11 LAB
ANION GAP SERPL CALCULATED.3IONS-SCNC: 8 MMOL/L (ref 9–16)
BASOPHILS # BLD: 0.04 K/UL (ref 0–0.2)
BASOPHILS NFR BLD: 1 % (ref 0–2)
BUN SERPL-MCNC: 68 MG/DL (ref 6–20)
CALCIUM SERPL-MCNC: 8.1 MG/DL (ref 8.6–10.4)
CHLORIDE SERPL-SCNC: 112 MMOL/L (ref 98–107)
CO2 SERPL-SCNC: 22 MMOL/L (ref 20–31)
CREAT SERPL-MCNC: 3.2 MG/DL (ref 0.7–1.2)
CREAT UR-MCNC: 89.4 MG/DL (ref 39–259)
EKG ATRIAL RATE: 63 BPM
EKG P AXIS: 15 DEGREES
EKG P-R INTERVAL: 242 MS
EKG Q-T INTERVAL: 422 MS
EKG QRS DURATION: 114 MS
EKG QTC CALCULATION (BAZETT): 431 MS
EKG R AXIS: -32 DEGREES
EKG T AXIS: 76 DEGREES
EKG VENTRICULAR RATE: 63 BPM
EOSINOPHIL # BLD: 0.25 K/UL (ref 0–0.44)
EOSINOPHILS RELATIVE PERCENT: 4 % (ref 1–4)
ERYTHROCYTE [DISTWIDTH] IN BLOOD BY AUTOMATED COUNT: 15.3 % (ref 11.8–14.4)
GFR, ESTIMATED: 23 ML/MIN/1.73M2
GLUCOSE SERPL-MCNC: 104 MG/DL (ref 74–99)
HCT VFR BLD AUTO: 32.9 % (ref 40.7–50.3)
HGB BLD-MCNC: 9.8 G/DL (ref 13–17)
IMM GRANULOCYTES # BLD AUTO: 0.04 K/UL (ref 0–0.3)
IMM GRANULOCYTES NFR BLD: 1 %
LYMPHOCYTES NFR BLD: 0.94 K/UL (ref 1.1–3.7)
LYMPHOCYTES RELATIVE PERCENT: 16 % (ref 24–43)
MCH RBC QN AUTO: 29.2 PG (ref 25.2–33.5)
MCHC RBC AUTO-ENTMCNC: 29.8 G/DL (ref 28.4–34.8)
MCV RBC AUTO: 97.9 FL (ref 82.6–102.9)
MONOCYTES NFR BLD: 0.57 K/UL (ref 0.1–1.2)
MONOCYTES NFR BLD: 9 % (ref 3–12)
NEUTROPHILS NFR BLD: 69 % (ref 36–65)
NEUTS SEG NFR BLD: 4.24 K/UL (ref 1.5–8.1)
NRBC BLD-RTO: 0 PER 100 WBC
PLATELET # BLD AUTO: 164 K/UL (ref 138–453)
PMV BLD AUTO: 9.4 FL (ref 8.1–13.5)
POTASSIUM SERPL-SCNC: 4.9 MMOL/L (ref 3.7–5.3)
RBC # BLD AUTO: 3.36 M/UL (ref 4.21–5.77)
RBC # BLD: ABNORMAL 10*6/UL
SODIUM SERPL-SCNC: 142 MMOL/L (ref 136–145)
SODIUM UR-SCNC: 44 MMOL/L
TOTAL PROTEIN, URINE: 30 MG/DL
WBC OTHER # BLD: 6.1 K/UL (ref 3.5–11.3)

## 2024-09-11 PROCEDURE — 85025 COMPLETE CBC W/AUTO DIFF WBC: CPT

## 2024-09-11 PROCEDURE — 6370000000 HC RX 637 (ALT 250 FOR IP): Performed by: PHYSICIAN ASSISTANT

## 2024-09-11 PROCEDURE — 82570 ASSAY OF URINE CREATININE: CPT

## 2024-09-11 PROCEDURE — 2580000003 HC RX 258: Performed by: INTERNAL MEDICINE

## 2024-09-11 PROCEDURE — 99232 SBSQ HOSP IP/OBS MODERATE 35: CPT | Performed by: INTERNAL MEDICINE

## 2024-09-11 PROCEDURE — 99232 SBSQ HOSP IP/OBS MODERATE 35: CPT | Performed by: STUDENT IN AN ORGANIZED HEALTH CARE EDUCATION/TRAINING PROGRAM

## 2024-09-11 PROCEDURE — 1200000000 HC SEMI PRIVATE

## 2024-09-11 PROCEDURE — 6360000002 HC RX W HCPCS: Performed by: STUDENT IN AN ORGANIZED HEALTH CARE EDUCATION/TRAINING PROGRAM

## 2024-09-11 PROCEDURE — 84300 ASSAY OF URINE SODIUM: CPT

## 2024-09-11 PROCEDURE — 36415 COLL VENOUS BLD VENIPUNCTURE: CPT

## 2024-09-11 PROCEDURE — 6370000000 HC RX 637 (ALT 250 FOR IP): Performed by: STUDENT IN AN ORGANIZED HEALTH CARE EDUCATION/TRAINING PROGRAM

## 2024-09-11 PROCEDURE — 2580000003 HC RX 258: Performed by: PHYSICIAN ASSISTANT

## 2024-09-11 PROCEDURE — 84156 ASSAY OF PROTEIN URINE: CPT

## 2024-09-11 PROCEDURE — 80048 BASIC METABOLIC PNL TOTAL CA: CPT

## 2024-09-11 RX ORDER — SODIUM CHLORIDE 450 MG/100ML
INJECTION, SOLUTION INTRAVENOUS CONTINUOUS
Status: DISCONTINUED | OUTPATIENT
Start: 2024-09-11 | End: 2024-09-12

## 2024-09-11 RX ADMIN — CARVEDILOL 37.5 MG: 25 TABLET, FILM COATED ORAL at 12:16

## 2024-09-11 RX ADMIN — Medication 2000 MG: at 20:28

## 2024-09-11 RX ADMIN — NIFEDIPINE 60 MG: 60 TABLET, EXTENDED RELEASE ORAL at 12:03

## 2024-09-11 RX ADMIN — FERROUS SULFATE TAB EC 325 MG (65 MG FE EQUIVALENT) 325 MG: 325 (65 FE) TABLET DELAYED RESPONSE at 19:56

## 2024-09-11 RX ADMIN — APIXABAN 2.5 MG: 2.5 TABLET, FILM COATED ORAL at 19:56

## 2024-09-11 RX ADMIN — ACETAMINOPHEN 650 MG: 325 TABLET ORAL at 19:56

## 2024-09-11 RX ADMIN — SODIUM CHLORIDE: 4.5 INJECTION, SOLUTION INTRAVENOUS at 18:10

## 2024-09-11 RX ADMIN — FERROUS SULFATE TAB EC 325 MG (65 MG FE EQUIVALENT) 325 MG: 325 (65 FE) TABLET DELAYED RESPONSE at 12:03

## 2024-09-11 RX ADMIN — BUDESONIDE 3 MG: 3 CAPSULE, COATED PELLETS ORAL at 12:03

## 2024-09-11 RX ADMIN — ARIPIPRAZOLE 15 MG: 15 TABLET ORAL at 12:03

## 2024-09-11 RX ADMIN — APIXABAN 2.5 MG: 2.5 TABLET, FILM COATED ORAL at 12:03

## 2024-09-11 RX ADMIN — SODIUM CHLORIDE, PRESERVATIVE FREE 10 ML: 5 INJECTION INTRAVENOUS at 12:06

## 2024-09-11 ASSESSMENT — PAIN DESCRIPTION - LOCATION: LOCATION: LEG

## 2024-09-11 ASSESSMENT — PAIN SCALES - GENERAL
PAINLEVEL_OUTOF10: 3
PAINLEVEL_OUTOF10: 2

## 2024-09-11 ASSESSMENT — PAIN DESCRIPTION - ORIENTATION: ORIENTATION: LEFT

## 2024-09-12 PROBLEM — I89.0 LYMPHEDEMA: Status: ACTIVE | Noted: 2024-09-12

## 2024-09-12 LAB
ANION GAP SERPL CALCULATED.3IONS-SCNC: 8 MMOL/L (ref 9–16)
BASOPHILS # BLD: 0.04 K/UL (ref 0–0.2)
BASOPHILS NFR BLD: 1 % (ref 0–2)
BUN SERPL-MCNC: 56 MG/DL (ref 6–20)
C3 SERPL-MCNC: 140 MG/DL (ref 90–180)
C4 SERPL-MCNC: 21 MG/DL (ref 10–40)
CALCIUM SERPL-MCNC: 8.1 MG/DL (ref 8.6–10.4)
CHLORIDE SERPL-SCNC: 112 MMOL/L (ref 98–107)
CO2 SERPL-SCNC: 20 MMOL/L (ref 20–31)
CREAT SERPL-MCNC: 2.7 MG/DL (ref 0.7–1.2)
EOSINOPHIL # BLD: 0.29 K/UL (ref 0–0.44)
EOSINOPHILS RELATIVE PERCENT: 4 % (ref 1–4)
ERYTHROCYTE [DISTWIDTH] IN BLOOD BY AUTOMATED COUNT: 15.2 % (ref 11.8–14.4)
FREE KAPPA/LAMBDA RATIO: 2.69 (ref 0.22–1.74)
GFR, ESTIMATED: 27 ML/MIN/1.73M2
GLUCOSE SERPL-MCNC: 109 MG/DL (ref 74–99)
HCT VFR BLD AUTO: 37.5 % (ref 40.7–50.3)
HGB BLD-MCNC: 11 G/DL (ref 13–17)
IMM GRANULOCYTES # BLD AUTO: 0.05 K/UL (ref 0–0.3)
IMM GRANULOCYTES NFR BLD: 1 %
KAPPA LC FREE SER-MCNC: 132 MG/L
LAMBDA LC FREE SERPL-MCNC: 49 MG/L (ref 4.2–27.7)
LYMPHOCYTES NFR BLD: 0.76 K/UL (ref 1.1–3.7)
LYMPHOCYTES RELATIVE PERCENT: 11 % (ref 24–43)
MCH RBC QN AUTO: 29 PG (ref 25.2–33.5)
MCHC RBC AUTO-ENTMCNC: 29.3 G/DL (ref 28.4–34.8)
MCV RBC AUTO: 98.9 FL (ref 82.6–102.9)
MONOCYTES NFR BLD: 0.59 K/UL (ref 0.1–1.2)
MONOCYTES NFR BLD: 8 % (ref 3–12)
NEUTROPHILS NFR BLD: 75 % (ref 36–65)
NEUTS SEG NFR BLD: 5.33 K/UL (ref 1.5–8.1)
NRBC BLD-RTO: 0 PER 100 WBC
PLATELET # BLD AUTO: 173 K/UL (ref 138–453)
PMV BLD AUTO: 9.2 FL (ref 8.1–13.5)
POTASSIUM SERPL-SCNC: 4.9 MMOL/L (ref 3.7–5.3)
RBC # BLD AUTO: 3.79 M/UL (ref 4.21–5.77)
RBC # BLD: ABNORMAL 10*6/UL
SODIUM SERPL-SCNC: 140 MMOL/L (ref 136–145)
WBC OTHER # BLD: 7.1 K/UL (ref 3.5–11.3)

## 2024-09-12 PROCEDURE — 86038 ANTINUCLEAR ANTIBODIES: CPT

## 2024-09-12 PROCEDURE — 86225 DNA ANTIBODY NATIVE: CPT

## 2024-09-12 PROCEDURE — 84165 PROTEIN E-PHORESIS SERUM: CPT

## 2024-09-12 PROCEDURE — 1200000000 HC SEMI PRIVATE

## 2024-09-12 PROCEDURE — 84155 ASSAY OF PROTEIN SERUM: CPT

## 2024-09-12 PROCEDURE — 85025 COMPLETE CBC W/AUTO DIFF WBC: CPT

## 2024-09-12 PROCEDURE — 99232 SBSQ HOSP IP/OBS MODERATE 35: CPT | Performed by: INTERNAL MEDICINE

## 2024-09-12 PROCEDURE — 86160 COMPLEMENT ANTIGEN: CPT

## 2024-09-12 PROCEDURE — 6370000000 HC RX 637 (ALT 250 FOR IP): Performed by: STUDENT IN AN ORGANIZED HEALTH CARE EDUCATION/TRAINING PROGRAM

## 2024-09-12 PROCEDURE — 80048 BASIC METABOLIC PNL TOTAL CA: CPT

## 2024-09-12 PROCEDURE — 2580000003 HC RX 258: Performed by: PHYSICIAN ASSISTANT

## 2024-09-12 PROCEDURE — 6360000002 HC RX W HCPCS: Performed by: STUDENT IN AN ORGANIZED HEALTH CARE EDUCATION/TRAINING PROGRAM

## 2024-09-12 PROCEDURE — 99232 SBSQ HOSP IP/OBS MODERATE 35: CPT | Performed by: STUDENT IN AN ORGANIZED HEALTH CARE EDUCATION/TRAINING PROGRAM

## 2024-09-12 PROCEDURE — 83516 IMMUNOASSAY NONANTIBODY: CPT

## 2024-09-12 PROCEDURE — 36415 COLL VENOUS BLD VENIPUNCTURE: CPT

## 2024-09-12 PROCEDURE — 83521 IG LIGHT CHAINS FREE EACH: CPT

## 2024-09-12 PROCEDURE — 6370000000 HC RX 637 (ALT 250 FOR IP): Performed by: PHYSICIAN ASSISTANT

## 2024-09-12 PROCEDURE — 6370000000 HC RX 637 (ALT 250 FOR IP): Performed by: INTERNAL MEDICINE

## 2024-09-12 RX ORDER — TORSEMIDE 20 MG/1
10 TABLET ORAL DAILY
Status: DISCONTINUED | OUTPATIENT
Start: 2024-09-12 | End: 2024-09-14

## 2024-09-12 RX ORDER — TROSPIUM CHLORIDE 20 MG/1
20 TABLET, FILM COATED ORAL DAILY
Status: DISCONTINUED | OUTPATIENT
Start: 2024-09-12 | End: 2024-09-20 | Stop reason: HOSPADM

## 2024-09-12 RX ADMIN — CARVEDILOL 37.5 MG: 25 TABLET, FILM COATED ORAL at 08:40

## 2024-09-12 RX ADMIN — SODIUM CHLORIDE, PRESERVATIVE FREE 10 ML: 5 INJECTION INTRAVENOUS at 21:46

## 2024-09-12 RX ADMIN — Medication 2000 MG: at 21:45

## 2024-09-12 RX ADMIN — FERROUS SULFATE TAB EC 325 MG (65 MG FE EQUIVALENT) 325 MG: 325 (65 FE) TABLET DELAYED RESPONSE at 21:45

## 2024-09-12 RX ADMIN — TORSEMIDE 10 MG: 20 TABLET ORAL at 11:53

## 2024-09-12 RX ADMIN — BUDESONIDE 3 MG: 3 CAPSULE, COATED PELLETS ORAL at 08:40

## 2024-09-12 RX ADMIN — SODIUM CHLORIDE, PRESERVATIVE FREE 10 ML: 5 INJECTION INTRAVENOUS at 08:41

## 2024-09-12 RX ADMIN — CARVEDILOL 37.5 MG: 25 TABLET, FILM COATED ORAL at 17:07

## 2024-09-12 RX ADMIN — APIXABAN 2.5 MG: 2.5 TABLET, FILM COATED ORAL at 08:40

## 2024-09-12 RX ADMIN — ARIPIPRAZOLE 15 MG: 15 TABLET ORAL at 08:40

## 2024-09-12 RX ADMIN — NIFEDIPINE 60 MG: 60 TABLET, EXTENDED RELEASE ORAL at 08:40

## 2024-09-12 RX ADMIN — APIXABAN 2.5 MG: 2.5 TABLET, FILM COATED ORAL at 21:45

## 2024-09-12 RX ADMIN — ACETAMINOPHEN 650 MG: 325 TABLET ORAL at 21:45

## 2024-09-12 RX ADMIN — FERROUS SULFATE TAB EC 325 MG (65 MG FE EQUIVALENT) 325 MG: 325 (65 FE) TABLET DELAYED RESPONSE at 08:40

## 2024-09-12 RX ADMIN — TROSPIUM CHLORIDE 20 MG: 20 TABLET, FILM COATED ORAL at 11:48

## 2024-09-12 ASSESSMENT — PAIN SCALES - GENERAL
PAINLEVEL_OUTOF10: 1
PAINLEVEL_OUTOF10: 3

## 2024-09-13 LAB
ANION GAP SERPL CALCULATED.3IONS-SCNC: 10 MMOL/L (ref 9–16)
BASOPHILS # BLD: 0 K/UL (ref 0–0.2)
BASOPHILS NFR BLD: 0 % (ref 0–2)
BUN SERPL-MCNC: 48 MG/DL (ref 6–20)
CALCIUM SERPL-MCNC: 8.4 MG/DL (ref 8.6–10.4)
CHLORIDE SERPL-SCNC: 111 MMOL/L (ref 98–107)
CO2 SERPL-SCNC: 20 MMOL/L (ref 20–31)
CREAT SERPL-MCNC: 2.5 MG/DL (ref 0.7–1.2)
EOSINOPHIL # BLD: 0.22 K/UL (ref 0–0.4)
EOSINOPHILS RELATIVE PERCENT: 3 % (ref 1–4)
ERYTHROCYTE [DISTWIDTH] IN BLOOD BY AUTOMATED COUNT: 15 % (ref 11.8–14.4)
GFR, ESTIMATED: 31 ML/MIN/1.73M2
GLUCOSE SERPL-MCNC: 107 MG/DL (ref 74–99)
HCT VFR BLD AUTO: 40 % (ref 40.7–50.3)
HGB BLD-MCNC: 11.3 G/DL (ref 13–17)
IMM GRANULOCYTES # BLD AUTO: 0 K/UL (ref 0–0.3)
IMM GRANULOCYTES NFR BLD: 0 %
LYMPHOCYTES NFR BLD: 0.94 K/UL (ref 1–4.8)
LYMPHOCYTES RELATIVE PERCENT: 13 % (ref 24–44)
MCH RBC QN AUTO: 29.3 PG (ref 25.2–33.5)
MCHC RBC AUTO-ENTMCNC: 28.3 G/DL (ref 28.4–34.8)
MCV RBC AUTO: 103.6 FL (ref 82.6–102.9)
MONOCYTES NFR BLD: 0.43 K/UL (ref 0.1–0.8)
MONOCYTES NFR BLD: 6 % (ref 1–7)
MORPHOLOGY: ABNORMAL
MORPHOLOGY: ABNORMAL
NEUTROPHILS NFR BLD: 78 % (ref 36–66)
NEUTS SEG NFR BLD: 5.61 K/UL (ref 1.8–7.7)
NRBC BLD-RTO: 0 PER 100 WBC
PLATELET # BLD AUTO: 185 K/UL (ref 138–453)
PMV BLD AUTO: 9.5 FL (ref 8.1–13.5)
POTASSIUM SERPL-SCNC: 4.6 MMOL/L (ref 3.7–5.3)
RBC # BLD AUTO: 3.86 M/UL (ref 4.21–5.77)
SODIUM SERPL-SCNC: 141 MMOL/L (ref 136–145)
WBC OTHER # BLD: 7.2 K/UL (ref 3.5–11.3)

## 2024-09-13 PROCEDURE — 6370000000 HC RX 637 (ALT 250 FOR IP): Performed by: INTERNAL MEDICINE

## 2024-09-13 PROCEDURE — 36415 COLL VENOUS BLD VENIPUNCTURE: CPT

## 2024-09-13 PROCEDURE — 6370000000 HC RX 637 (ALT 250 FOR IP): Performed by: STUDENT IN AN ORGANIZED HEALTH CARE EDUCATION/TRAINING PROGRAM

## 2024-09-13 PROCEDURE — 97530 THERAPEUTIC ACTIVITIES: CPT

## 2024-09-13 PROCEDURE — 1200000000 HC SEMI PRIVATE

## 2024-09-13 PROCEDURE — 85025 COMPLETE CBC W/AUTO DIFF WBC: CPT

## 2024-09-13 PROCEDURE — 86038 ANTINUCLEAR ANTIBODIES: CPT

## 2024-09-13 PROCEDURE — 80048 BASIC METABOLIC PNL TOTAL CA: CPT

## 2024-09-13 PROCEDURE — 51798 US URINE CAPACITY MEASURE: CPT

## 2024-09-13 PROCEDURE — 6370000000 HC RX 637 (ALT 250 FOR IP): Performed by: NURSE PRACTITIONER

## 2024-09-13 PROCEDURE — 83516 IMMUNOASSAY NONANTIBODY: CPT

## 2024-09-13 PROCEDURE — 97166 OT EVAL MOD COMPLEX 45 MIN: CPT

## 2024-09-13 PROCEDURE — 99239 HOSP IP/OBS DSCHRG MGMT >30: CPT | Performed by: INTERNAL MEDICINE

## 2024-09-13 PROCEDURE — 84155 ASSAY OF PROTEIN SERUM: CPT

## 2024-09-13 PROCEDURE — 99232 SBSQ HOSP IP/OBS MODERATE 35: CPT | Performed by: INTERNAL MEDICINE

## 2024-09-13 PROCEDURE — 6370000000 HC RX 637 (ALT 250 FOR IP): Performed by: PHYSICIAN ASSISTANT

## 2024-09-13 PROCEDURE — 97163 PT EVAL HIGH COMPLEX 45 MIN: CPT

## 2024-09-13 PROCEDURE — 2580000003 HC RX 258: Performed by: PHYSICIAN ASSISTANT

## 2024-09-13 PROCEDURE — 84165 PROTEIN E-PHORESIS SERUM: CPT

## 2024-09-13 PROCEDURE — 86225 DNA ANTIBODY NATIVE: CPT

## 2024-09-13 RX ORDER — TORSEMIDE 10 MG/1
10 TABLET ORAL DAILY
Qty: 30 TABLET | Refills: 0 | Status: SHIPPED | OUTPATIENT
Start: 2024-09-14 | End: 2024-09-13

## 2024-09-13 RX ORDER — CEFADROXIL 500 MG/1
500 CAPSULE ORAL 2 TIMES DAILY
Qty: 14 CAPSULE | Refills: 0 | Status: SHIPPED | OUTPATIENT
Start: 2024-09-13 | End: 2024-09-20

## 2024-09-13 RX ORDER — HYDRALAZINE HYDROCHLORIDE 100 MG/1
100 TABLET, FILM COATED ORAL EVERY 8 HOURS SCHEDULED
Qty: 90 TABLET | Refills: 0 | Status: SHIPPED | OUTPATIENT
Start: 2024-09-13

## 2024-09-13 RX ORDER — TORSEMIDE 10 MG/1
10 TABLET ORAL DAILY
Qty: 30 TABLET | Refills: 0 | Status: SHIPPED | OUTPATIENT
Start: 2024-09-14

## 2024-09-13 RX ADMIN — APIXABAN 2.5 MG: 2.5 TABLET, FILM COATED ORAL at 20:53

## 2024-09-13 RX ADMIN — FERROUS SULFATE TAB EC 325 MG (65 MG FE EQUIVALENT) 325 MG: 325 (65 FE) TABLET DELAYED RESPONSE at 20:53

## 2024-09-13 RX ADMIN — SODIUM CHLORIDE, PRESERVATIVE FREE 10 ML: 5 INJECTION INTRAVENOUS at 20:54

## 2024-09-13 RX ADMIN — CARVEDILOL 37.5 MG: 25 TABLET, FILM COATED ORAL at 17:43

## 2024-09-13 RX ADMIN — TORSEMIDE 10 MG: 20 TABLET ORAL at 08:26

## 2024-09-13 RX ADMIN — FERROUS SULFATE TAB EC 325 MG (65 MG FE EQUIVALENT) 325 MG: 325 (65 FE) TABLET DELAYED RESPONSE at 08:26

## 2024-09-13 RX ADMIN — CARVEDILOL 37.5 MG: 25 TABLET, FILM COATED ORAL at 08:26

## 2024-09-13 RX ADMIN — CEPHALEXIN 250 MG: 250 CAPSULE ORAL at 13:50

## 2024-09-13 RX ADMIN — NIFEDIPINE 60 MG: 60 TABLET, EXTENDED RELEASE ORAL at 08:26

## 2024-09-13 RX ADMIN — SODIUM CHLORIDE, PRESERVATIVE FREE 10 ML: 5 INJECTION INTRAVENOUS at 08:27

## 2024-09-13 RX ADMIN — CEPHALEXIN 250 MG: 250 CAPSULE ORAL at 17:43

## 2024-09-13 RX ADMIN — BUDESONIDE 3 MG: 3 CAPSULE, COATED PELLETS ORAL at 08:26

## 2024-09-13 RX ADMIN — TROSPIUM CHLORIDE 20 MG: 20 TABLET, FILM COATED ORAL at 08:26

## 2024-09-13 RX ADMIN — APIXABAN 2.5 MG: 2.5 TABLET, FILM COATED ORAL at 08:26

## 2024-09-13 RX ADMIN — ARIPIPRAZOLE 15 MG: 15 TABLET ORAL at 08:26

## 2024-09-14 LAB
ANION GAP SERPL CALCULATED.3IONS-SCNC: 9 MMOL/L (ref 9–16)
BASOPHILS # BLD: 0.03 K/UL (ref 0–0.2)
BASOPHILS NFR BLD: 0 % (ref 0–2)
BUN SERPL-MCNC: 44 MG/DL (ref 6–20)
CALCIUM SERPL-MCNC: 8.6 MG/DL (ref 8.6–10.4)
CHLORIDE SERPL-SCNC: 109 MMOL/L (ref 98–107)
CO2 SERPL-SCNC: 25 MMOL/L (ref 20–31)
CREAT SERPL-MCNC: 2.3 MG/DL (ref 0.7–1.2)
EOSINOPHIL # BLD: 0.39 K/UL (ref 0–0.44)
EOSINOPHILS RELATIVE PERCENT: 6 % (ref 1–4)
ERYTHROCYTE [DISTWIDTH] IN BLOOD BY AUTOMATED COUNT: 15.1 % (ref 11.8–14.4)
GFR, ESTIMATED: 34 ML/MIN/1.73M2
GLUCOSE SERPL-MCNC: 112 MG/DL (ref 74–99)
HCT VFR BLD AUTO: 33.7 % (ref 40.7–50.3)
HGB BLD-MCNC: 10.3 G/DL (ref 13–17)
IMM GRANULOCYTES # BLD AUTO: 0.03 K/UL (ref 0–0.3)
IMM GRANULOCYTES NFR BLD: 0 %
LYMPHOCYTES NFR BLD: 0.81 K/UL (ref 1.1–3.7)
LYMPHOCYTES RELATIVE PERCENT: 12 % (ref 24–43)
MCH RBC QN AUTO: 29 PG (ref 25.2–33.5)
MCHC RBC AUTO-ENTMCNC: 30.6 G/DL (ref 28.4–34.8)
MCV RBC AUTO: 94.9 FL (ref 82.6–102.9)
MICROORGANISM SPEC CULT: NORMAL
MICROORGANISM SPEC CULT: NORMAL
MONOCYTES NFR BLD: 0.6 K/UL (ref 0.1–1.2)
MONOCYTES NFR BLD: 9 % (ref 3–12)
NEUTROPHILS NFR BLD: 72 % (ref 36–65)
NEUTS SEG NFR BLD: 4.84 K/UL (ref 1.5–8.1)
NRBC BLD-RTO: 0 PER 100 WBC
PLATELET # BLD AUTO: 168 K/UL (ref 138–453)
PMV BLD AUTO: 9.3 FL (ref 8.1–13.5)
POTASSIUM SERPL-SCNC: 4.5 MMOL/L (ref 3.7–5.3)
RBC # BLD AUTO: 3.55 M/UL (ref 4.21–5.77)
RBC # BLD: ABNORMAL 10*6/UL
SERVICE CMNT-IMP: NORMAL
SERVICE CMNT-IMP: NORMAL
SODIUM SERPL-SCNC: 143 MMOL/L (ref 136–145)
SPECIMEN DESCRIPTION: NORMAL
SPECIMEN DESCRIPTION: NORMAL
WBC OTHER # BLD: 6.7 K/UL (ref 3.5–11.3)

## 2024-09-14 PROCEDURE — 84165 PROTEIN E-PHORESIS SERUM: CPT

## 2024-09-14 PROCEDURE — 6370000000 HC RX 637 (ALT 250 FOR IP): Performed by: STUDENT IN AN ORGANIZED HEALTH CARE EDUCATION/TRAINING PROGRAM

## 2024-09-14 PROCEDURE — 99232 SBSQ HOSP IP/OBS MODERATE 35: CPT | Performed by: INTERNAL MEDICINE

## 2024-09-14 PROCEDURE — 80048 BASIC METABOLIC PNL TOTAL CA: CPT

## 2024-09-14 PROCEDURE — 6370000000 HC RX 637 (ALT 250 FOR IP): Performed by: NURSE PRACTITIONER

## 2024-09-14 PROCEDURE — 86225 DNA ANTIBODY NATIVE: CPT

## 2024-09-14 PROCEDURE — 1200000000 HC SEMI PRIVATE

## 2024-09-14 PROCEDURE — 6370000000 HC RX 637 (ALT 250 FOR IP): Performed by: INTERNAL MEDICINE

## 2024-09-14 PROCEDURE — 6370000000 HC RX 637 (ALT 250 FOR IP): Performed by: PHYSICIAN ASSISTANT

## 2024-09-14 PROCEDURE — 85025 COMPLETE CBC W/AUTO DIFF WBC: CPT

## 2024-09-14 PROCEDURE — 86334 IMMUNOFIX E-PHORESIS SERUM: CPT

## 2024-09-14 PROCEDURE — 84155 ASSAY OF PROTEIN SERUM: CPT

## 2024-09-14 PROCEDURE — 36415 COLL VENOUS BLD VENIPUNCTURE: CPT

## 2024-09-14 PROCEDURE — 83516 IMMUNOASSAY NONANTIBODY: CPT

## 2024-09-14 PROCEDURE — 86038 ANTINUCLEAR ANTIBODIES: CPT

## 2024-09-14 PROCEDURE — 2580000003 HC RX 258: Performed by: PHYSICIAN ASSISTANT

## 2024-09-14 RX ORDER — TORSEMIDE 20 MG/1
5 TABLET ORAL DAILY
Status: DISCONTINUED | OUTPATIENT
Start: 2024-09-15 | End: 2024-09-20 | Stop reason: HOSPADM

## 2024-09-14 RX ADMIN — BUDESONIDE 3 MG: 3 CAPSULE, COATED PELLETS ORAL at 09:15

## 2024-09-14 RX ADMIN — CEPHALEXIN 250 MG: 250 CAPSULE ORAL at 11:26

## 2024-09-14 RX ADMIN — TORSEMIDE 10 MG: 20 TABLET ORAL at 09:15

## 2024-09-14 RX ADMIN — APIXABAN 2.5 MG: 2.5 TABLET, FILM COATED ORAL at 09:15

## 2024-09-14 RX ADMIN — APIXABAN 2.5 MG: 2.5 TABLET, FILM COATED ORAL at 21:33

## 2024-09-14 RX ADMIN — FERROUS SULFATE TAB EC 325 MG (65 MG FE EQUIVALENT) 325 MG: 325 (65 FE) TABLET DELAYED RESPONSE at 09:17

## 2024-09-14 RX ADMIN — CARVEDILOL 37.5 MG: 25 TABLET, FILM COATED ORAL at 16:58

## 2024-09-14 RX ADMIN — ARIPIPRAZOLE 15 MG: 15 TABLET ORAL at 09:15

## 2024-09-14 RX ADMIN — TROSPIUM CHLORIDE 20 MG: 20 TABLET, FILM COATED ORAL at 09:15

## 2024-09-14 RX ADMIN — CEPHALEXIN 250 MG: 250 CAPSULE ORAL at 16:58

## 2024-09-14 RX ADMIN — CARVEDILOL 37.5 MG: 25 TABLET, FILM COATED ORAL at 09:17

## 2024-09-14 RX ADMIN — FERROUS SULFATE TAB EC 325 MG (65 MG FE EQUIVALENT) 325 MG: 325 (65 FE) TABLET DELAYED RESPONSE at 21:33

## 2024-09-14 RX ADMIN — NIFEDIPINE 60 MG: 60 TABLET, EXTENDED RELEASE ORAL at 09:15

## 2024-09-14 RX ADMIN — SODIUM CHLORIDE, PRESERVATIVE FREE 10 ML: 5 INJECTION INTRAVENOUS at 09:17

## 2024-09-14 RX ADMIN — CEPHALEXIN 250 MG: 250 CAPSULE ORAL at 06:36

## 2024-09-14 RX ADMIN — CEPHALEXIN 250 MG: 250 CAPSULE ORAL at 00:17

## 2024-09-14 ASSESSMENT — PAIN SCALES - GENERAL
PAINLEVEL_OUTOF10: 2

## 2024-09-15 LAB
ANION GAP SERPL CALCULATED.3IONS-SCNC: 10 MMOL/L (ref 9–16)
BASOPHILS # BLD: 0.03 K/UL (ref 0–0.2)
BASOPHILS NFR BLD: 0 % (ref 0–2)
BUN SERPL-MCNC: 38 MG/DL (ref 6–20)
CALCIUM SERPL-MCNC: 8.5 MG/DL (ref 8.6–10.4)
CHLORIDE SERPL-SCNC: 106 MMOL/L (ref 98–107)
CO2 SERPL-SCNC: 24 MMOL/L (ref 20–31)
CREAT SERPL-MCNC: 2.3 MG/DL (ref 0.7–1.2)
EOSINOPHIL # BLD: 0.29 K/UL (ref 0–0.44)
EOSINOPHILS RELATIVE PERCENT: 4 % (ref 1–4)
ERYTHROCYTE [DISTWIDTH] IN BLOOD BY AUTOMATED COUNT: 14.9 % (ref 11.8–14.4)
GFR, ESTIMATED: 33 ML/MIN/1.73M2
GLUCOSE SERPL-MCNC: 124 MG/DL (ref 74–99)
HCT VFR BLD AUTO: 34.7 % (ref 40.7–50.3)
HGB BLD-MCNC: 10.4 G/DL (ref 13–17)
IMM GRANULOCYTES # BLD AUTO: 0.03 K/UL (ref 0–0.3)
IMM GRANULOCYTES NFR BLD: 0 %
LYMPHOCYTES NFR BLD: 0.77 K/UL (ref 1.1–3.7)
LYMPHOCYTES RELATIVE PERCENT: 12 % (ref 24–43)
MCH RBC QN AUTO: 29.4 PG (ref 25.2–33.5)
MCHC RBC AUTO-ENTMCNC: 30 G/DL (ref 28.4–34.8)
MCV RBC AUTO: 98 FL (ref 82.6–102.9)
MONOCYTES NFR BLD: 0.45 K/UL (ref 0.1–1.2)
MONOCYTES NFR BLD: 7 % (ref 3–12)
NEUTROPHILS NFR BLD: 77 % (ref 36–65)
NEUTS SEG NFR BLD: 5.1 K/UL (ref 1.5–8.1)
NRBC BLD-RTO: 0 PER 100 WBC
PLATELET # BLD AUTO: 153 K/UL (ref 138–453)
PMV BLD AUTO: 9.5 FL (ref 8.1–13.5)
POTASSIUM SERPL-SCNC: 4.3 MMOL/L (ref 3.7–5.3)
RBC # BLD AUTO: 3.54 M/UL (ref 4.21–5.77)
RBC # BLD: ABNORMAL 10*6/UL
SODIUM SERPL-SCNC: 140 MMOL/L (ref 136–145)
WBC OTHER # BLD: 6.7 K/UL (ref 3.5–11.3)

## 2024-09-15 PROCEDURE — 80048 BASIC METABOLIC PNL TOTAL CA: CPT

## 2024-09-15 PROCEDURE — 99231 SBSQ HOSP IP/OBS SF/LOW 25: CPT | Performed by: INTERNAL MEDICINE

## 2024-09-15 PROCEDURE — 85025 COMPLETE CBC W/AUTO DIFF WBC: CPT

## 2024-09-15 PROCEDURE — 6370000000 HC RX 637 (ALT 250 FOR IP): Performed by: INTERNAL MEDICINE

## 2024-09-15 PROCEDURE — 6370000000 HC RX 637 (ALT 250 FOR IP): Performed by: STUDENT IN AN ORGANIZED HEALTH CARE EDUCATION/TRAINING PROGRAM

## 2024-09-15 PROCEDURE — 6370000000 HC RX 637 (ALT 250 FOR IP): Performed by: PHYSICIAN ASSISTANT

## 2024-09-15 PROCEDURE — 2580000003 HC RX 258: Performed by: PHYSICIAN ASSISTANT

## 2024-09-15 PROCEDURE — 99232 SBSQ HOSP IP/OBS MODERATE 35: CPT | Performed by: INTERNAL MEDICINE

## 2024-09-15 PROCEDURE — 36415 COLL VENOUS BLD VENIPUNCTURE: CPT

## 2024-09-15 PROCEDURE — 6370000000 HC RX 637 (ALT 250 FOR IP): Performed by: NURSE PRACTITIONER

## 2024-09-15 PROCEDURE — 1200000000 HC SEMI PRIVATE

## 2024-09-15 RX ADMIN — APIXABAN 2.5 MG: 2.5 TABLET, FILM COATED ORAL at 08:35

## 2024-09-15 RX ADMIN — CEPHALEXIN 250 MG: 250 CAPSULE ORAL at 05:40

## 2024-09-15 RX ADMIN — CARVEDILOL 37.5 MG: 25 TABLET, FILM COATED ORAL at 08:36

## 2024-09-15 RX ADMIN — ACETAMINOPHEN 650 MG: 325 TABLET ORAL at 20:49

## 2024-09-15 RX ADMIN — FERROUS SULFATE TAB EC 325 MG (65 MG FE EQUIVALENT) 325 MG: 325 (65 FE) TABLET DELAYED RESPONSE at 08:36

## 2024-09-15 RX ADMIN — APIXABAN 2.5 MG: 2.5 TABLET, FILM COATED ORAL at 20:46

## 2024-09-15 RX ADMIN — SODIUM CHLORIDE, PRESERVATIVE FREE 10 ML: 5 INJECTION INTRAVENOUS at 08:41

## 2024-09-15 RX ADMIN — FERROUS SULFATE TAB EC 325 MG (65 MG FE EQUIVALENT) 325 MG: 325 (65 FE) TABLET DELAYED RESPONSE at 20:46

## 2024-09-15 RX ADMIN — TROSPIUM CHLORIDE 20 MG: 20 TABLET, FILM COATED ORAL at 08:38

## 2024-09-15 RX ADMIN — CEPHALEXIN 250 MG: 250 CAPSULE ORAL at 12:09

## 2024-09-15 RX ADMIN — CARVEDILOL 37.5 MG: 25 TABLET, FILM COATED ORAL at 17:58

## 2024-09-15 RX ADMIN — CEPHALEXIN 250 MG: 250 CAPSULE ORAL at 00:44

## 2024-09-15 RX ADMIN — CEPHALEXIN 250 MG: 250 CAPSULE ORAL at 17:58

## 2024-09-15 RX ADMIN — TORSEMIDE 5 MG: 20 TABLET ORAL at 08:36

## 2024-09-15 RX ADMIN — ARIPIPRAZOLE 15 MG: 15 TABLET ORAL at 08:38

## 2024-09-15 RX ADMIN — SODIUM CHLORIDE, PRESERVATIVE FREE 10 ML: 5 INJECTION INTRAVENOUS at 20:46

## 2024-09-15 RX ADMIN — BUDESONIDE 3 MG: 3 CAPSULE, COATED PELLETS ORAL at 08:36

## 2024-09-15 RX ADMIN — NIFEDIPINE 60 MG: 60 TABLET, EXTENDED RELEASE ORAL at 08:35

## 2024-09-15 ASSESSMENT — PAIN SCALES - GENERAL: PAINLEVEL_OUTOF10: 5

## 2024-09-15 ASSESSMENT — PAIN DESCRIPTION - LOCATION: LOCATION: LEG

## 2024-09-15 ASSESSMENT — PAIN DESCRIPTION - ORIENTATION: ORIENTATION: LEFT

## 2024-09-15 ASSESSMENT — PAIN SCALES - WONG BAKER: WONGBAKER_NUMERICALRESPONSE: HURTS LITTLE MORE

## 2024-09-16 PROCEDURE — 99231 SBSQ HOSP IP/OBS SF/LOW 25: CPT | Performed by: INTERNAL MEDICINE

## 2024-09-16 PROCEDURE — 6370000000 HC RX 637 (ALT 250 FOR IP): Performed by: INTERNAL MEDICINE

## 2024-09-16 PROCEDURE — 2580000003 HC RX 258: Performed by: PHYSICIAN ASSISTANT

## 2024-09-16 PROCEDURE — 99212 OFFICE O/P EST SF 10 MIN: CPT

## 2024-09-16 PROCEDURE — 99232 SBSQ HOSP IP/OBS MODERATE 35: CPT | Performed by: STUDENT IN AN ORGANIZED HEALTH CARE EDUCATION/TRAINING PROGRAM

## 2024-09-16 PROCEDURE — 6370000000 HC RX 637 (ALT 250 FOR IP): Performed by: PHYSICIAN ASSISTANT

## 2024-09-16 PROCEDURE — 1200000000 HC SEMI PRIVATE

## 2024-09-16 PROCEDURE — 6370000000 HC RX 637 (ALT 250 FOR IP): Performed by: STUDENT IN AN ORGANIZED HEALTH CARE EDUCATION/TRAINING PROGRAM

## 2024-09-16 PROCEDURE — 6370000000 HC RX 637 (ALT 250 FOR IP): Performed by: NURSE PRACTITIONER

## 2024-09-16 RX ADMIN — ARIPIPRAZOLE 15 MG: 15 TABLET ORAL at 09:47

## 2024-09-16 RX ADMIN — CEPHALEXIN 250 MG: 250 CAPSULE ORAL at 00:09

## 2024-09-16 RX ADMIN — TROSPIUM CHLORIDE 20 MG: 20 TABLET, FILM COATED ORAL at 09:47

## 2024-09-16 RX ADMIN — BUDESONIDE 3 MG: 3 CAPSULE, COATED PELLETS ORAL at 09:47

## 2024-09-16 RX ADMIN — APIXABAN 2.5 MG: 2.5 TABLET, FILM COATED ORAL at 09:47

## 2024-09-16 RX ADMIN — FERROUS SULFATE TAB EC 325 MG (65 MG FE EQUIVALENT) 325 MG: 325 (65 FE) TABLET DELAYED RESPONSE at 09:47

## 2024-09-16 RX ADMIN — ACETAMINOPHEN 650 MG: 325 TABLET ORAL at 06:00

## 2024-09-16 RX ADMIN — NIFEDIPINE 60 MG: 60 TABLET, EXTENDED RELEASE ORAL at 09:46

## 2024-09-16 RX ADMIN — CARVEDILOL 37.5 MG: 25 TABLET, FILM COATED ORAL at 09:46

## 2024-09-16 RX ADMIN — APIXABAN 2.5 MG: 2.5 TABLET, FILM COATED ORAL at 20:52

## 2024-09-16 RX ADMIN — CEPHALEXIN 250 MG: 250 CAPSULE ORAL at 13:27

## 2024-09-16 RX ADMIN — TORSEMIDE 5 MG: 20 TABLET ORAL at 09:47

## 2024-09-16 RX ADMIN — CARVEDILOL 37.5 MG: 25 TABLET, FILM COATED ORAL at 17:47

## 2024-09-16 RX ADMIN — FERROUS SULFATE TAB EC 325 MG (65 MG FE EQUIVALENT) 325 MG: 325 (65 FE) TABLET DELAYED RESPONSE at 20:52

## 2024-09-16 RX ADMIN — SODIUM CHLORIDE, PRESERVATIVE FREE 10 ML: 5 INJECTION INTRAVENOUS at 20:52

## 2024-09-16 RX ADMIN — CEPHALEXIN 250 MG: 250 CAPSULE ORAL at 06:00

## 2024-09-16 ASSESSMENT — PAIN SCALES - GENERAL: PAINLEVEL_OUTOF10: 2

## 2024-09-16 ASSESSMENT — PAIN DESCRIPTION - LOCATION: LOCATION: LEG

## 2024-09-16 ASSESSMENT — PAIN DESCRIPTION - ORIENTATION: ORIENTATION: RIGHT;LEFT

## 2024-09-16 ASSESSMENT — PAIN DESCRIPTION - DESCRIPTORS: DESCRIPTORS: ACHING

## 2024-09-17 LAB
ALBUMIN PERCENT: 48 % (ref 45–65)
ALBUMIN SERPL-MCNC: 3 G/DL (ref 3.2–5.2)
ALPHA 2 PERCENT: 12 % (ref 6–13)
ALPHA1 GLOB SERPL ELPH-MCNC: 0.3 G/DL (ref 0.1–0.4)
ALPHA1 GLOB SERPL ELPH-MCNC: 5 % (ref 3–6)
ALPHA2 GLOB SERPL ELPH-MCNC: 0.7 G/DL (ref 0.5–0.9)
ANA SER QL IA: NEGATIVE
ANCA MYELOPEROXIDASE: <0.3 AU/ML (ref 0–3.5)
ANCA PROTEINASE 3: <0.7 AU/ML (ref 0–2)
B-GLOBULIN SERPL ELPH-MCNC: 0.8 G/DL (ref 0.5–1.1)
B-GLOBULIN SERPL ELPH-MCNC: 12 % (ref 11–19)
DSDNA IGG SER QL IA: 1.3 IU/ML
GAMMA GLOB SERPL ELPH-MCNC: 1.4 G/DL (ref 0.5–1.5)
GAMMA GLOBULIN %: 23 % (ref 9–20)
ITYP INTERPRETATION: NORMAL
NUCLEAR IGG SER IA-RTO: 0.4 U/ML
PATH REV: NORMAL
PATHOLOGIST: ABNORMAL
PROT PATTERN SERPL ELPH-IMP: ABNORMAL
PROT SERPL-MCNC: 6.2 G/DL (ref 6.6–8.7)
TOTAL PROT. SUM,%: 100 % (ref 98–102)
TOTAL PROT. SUM: 6.2 G/DL (ref 6.3–8.2)

## 2024-09-17 PROCEDURE — 97535 SELF CARE MNGMENT TRAINING: CPT

## 2024-09-17 PROCEDURE — 97530 THERAPEUTIC ACTIVITIES: CPT

## 2024-09-17 PROCEDURE — 6370000000 HC RX 637 (ALT 250 FOR IP): Performed by: PHYSICIAN ASSISTANT

## 2024-09-17 PROCEDURE — 2580000003 HC RX 258: Performed by: PHYSICIAN ASSISTANT

## 2024-09-17 PROCEDURE — 99231 SBSQ HOSP IP/OBS SF/LOW 25: CPT | Performed by: STUDENT IN AN ORGANIZED HEALTH CARE EDUCATION/TRAINING PROGRAM

## 2024-09-17 PROCEDURE — 6370000000 HC RX 637 (ALT 250 FOR IP): Performed by: STUDENT IN AN ORGANIZED HEALTH CARE EDUCATION/TRAINING PROGRAM

## 2024-09-17 PROCEDURE — 97110 THERAPEUTIC EXERCISES: CPT

## 2024-09-17 PROCEDURE — 6370000000 HC RX 637 (ALT 250 FOR IP): Performed by: INTERNAL MEDICINE

## 2024-09-17 PROCEDURE — 99232 SBSQ HOSP IP/OBS MODERATE 35: CPT | Performed by: INTERNAL MEDICINE

## 2024-09-17 PROCEDURE — 1200000000 HC SEMI PRIVATE

## 2024-09-17 RX ADMIN — APIXABAN 2.5 MG: 2.5 TABLET, FILM COATED ORAL at 20:47

## 2024-09-17 RX ADMIN — NIFEDIPINE 60 MG: 60 TABLET, EXTENDED RELEASE ORAL at 09:46

## 2024-09-17 RX ADMIN — TORSEMIDE 5 MG: 20 TABLET ORAL at 09:46

## 2024-09-17 RX ADMIN — SODIUM CHLORIDE, PRESERVATIVE FREE 10 ML: 5 INJECTION INTRAVENOUS at 20:47

## 2024-09-17 RX ADMIN — FERROUS SULFATE TAB EC 325 MG (65 MG FE EQUIVALENT) 325 MG: 325 (65 FE) TABLET DELAYED RESPONSE at 20:47

## 2024-09-17 RX ADMIN — BUDESONIDE 3 MG: 3 CAPSULE, COATED PELLETS ORAL at 09:47

## 2024-09-17 RX ADMIN — CARVEDILOL 37.5 MG: 25 TABLET, FILM COATED ORAL at 17:25

## 2024-09-17 RX ADMIN — FERROUS SULFATE TAB EC 325 MG (65 MG FE EQUIVALENT) 325 MG: 325 (65 FE) TABLET DELAYED RESPONSE at 09:47

## 2024-09-17 RX ADMIN — APIXABAN 2.5 MG: 2.5 TABLET, FILM COATED ORAL at 09:47

## 2024-09-17 RX ADMIN — CARVEDILOL 37.5 MG: 25 TABLET, FILM COATED ORAL at 09:47

## 2024-09-17 RX ADMIN — TROSPIUM CHLORIDE 20 MG: 20 TABLET, FILM COATED ORAL at 09:47

## 2024-09-17 RX ADMIN — ARIPIPRAZOLE 15 MG: 15 TABLET ORAL at 09:47

## 2024-09-17 RX ADMIN — ACETAMINOPHEN 650 MG: 325 TABLET ORAL at 04:08

## 2024-09-17 RX ADMIN — SODIUM CHLORIDE, PRESERVATIVE FREE 10 ML: 5 INJECTION INTRAVENOUS at 09:47

## 2024-09-17 ASSESSMENT — PAIN SCALES - GENERAL
PAINLEVEL_OUTOF10: 4
PAINLEVEL_OUTOF10: 0

## 2024-09-18 PROCEDURE — 99232 SBSQ HOSP IP/OBS MODERATE 35: CPT | Performed by: STUDENT IN AN ORGANIZED HEALTH CARE EDUCATION/TRAINING PROGRAM

## 2024-09-18 PROCEDURE — 2580000003 HC RX 258: Performed by: PHYSICIAN ASSISTANT

## 2024-09-18 PROCEDURE — 6370000000 HC RX 637 (ALT 250 FOR IP): Performed by: INTERNAL MEDICINE

## 2024-09-18 PROCEDURE — 99231 SBSQ HOSP IP/OBS SF/LOW 25: CPT | Performed by: STUDENT IN AN ORGANIZED HEALTH CARE EDUCATION/TRAINING PROGRAM

## 2024-09-18 PROCEDURE — 6370000000 HC RX 637 (ALT 250 FOR IP): Performed by: STUDENT IN AN ORGANIZED HEALTH CARE EDUCATION/TRAINING PROGRAM

## 2024-09-18 PROCEDURE — 1200000000 HC SEMI PRIVATE

## 2024-09-18 PROCEDURE — 6370000000 HC RX 637 (ALT 250 FOR IP): Performed by: PHYSICIAN ASSISTANT

## 2024-09-18 RX ADMIN — APIXABAN 2.5 MG: 2.5 TABLET, FILM COATED ORAL at 21:43

## 2024-09-18 RX ADMIN — SODIUM CHLORIDE, PRESERVATIVE FREE 10 ML: 5 INJECTION INTRAVENOUS at 21:43

## 2024-09-18 RX ADMIN — ARIPIPRAZOLE 15 MG: 15 TABLET ORAL at 09:16

## 2024-09-18 RX ADMIN — TORSEMIDE 5 MG: 20 TABLET ORAL at 09:16

## 2024-09-18 RX ADMIN — APIXABAN 2.5 MG: 2.5 TABLET, FILM COATED ORAL at 09:16

## 2024-09-18 RX ADMIN — TROSPIUM CHLORIDE 20 MG: 20 TABLET, FILM COATED ORAL at 09:16

## 2024-09-18 RX ADMIN — FERROUS SULFATE TAB EC 325 MG (65 MG FE EQUIVALENT) 325 MG: 325 (65 FE) TABLET DELAYED RESPONSE at 09:16

## 2024-09-18 RX ADMIN — FERROUS SULFATE TAB EC 325 MG (65 MG FE EQUIVALENT) 325 MG: 325 (65 FE) TABLET DELAYED RESPONSE at 21:43

## 2024-09-18 RX ADMIN — CARVEDILOL 37.5 MG: 25 TABLET, FILM COATED ORAL at 09:15

## 2024-09-18 RX ADMIN — NIFEDIPINE 60 MG: 60 TABLET, EXTENDED RELEASE ORAL at 09:16

## 2024-09-18 RX ADMIN — BUDESONIDE 3 MG: 3 CAPSULE, COATED PELLETS ORAL at 09:16

## 2024-09-18 RX ADMIN — SODIUM CHLORIDE, PRESERVATIVE FREE 10 ML: 5 INJECTION INTRAVENOUS at 09:16

## 2024-09-18 RX ADMIN — ACETAMINOPHEN 650 MG: 325 TABLET ORAL at 21:43

## 2024-09-18 RX ADMIN — CARVEDILOL 37.5 MG: 25 TABLET, FILM COATED ORAL at 18:15

## 2024-09-18 ASSESSMENT — PAIN DESCRIPTION - LOCATION: LOCATION: LEG

## 2024-09-18 ASSESSMENT — PAIN DESCRIPTION - DESCRIPTORS: DESCRIPTORS: ACHING;SORE

## 2024-09-18 ASSESSMENT — PAIN SCALES - GENERAL
PAINLEVEL_OUTOF10: 4
PAINLEVEL_OUTOF10: 0
PAINLEVEL_OUTOF10: 2

## 2024-09-18 ASSESSMENT — PAIN DESCRIPTION - ORIENTATION: ORIENTATION: RIGHT;LEFT

## 2024-09-19 PROCEDURE — 1200000000 HC SEMI PRIVATE

## 2024-09-19 PROCEDURE — 6370000000 HC RX 637 (ALT 250 FOR IP): Performed by: PHYSICIAN ASSISTANT

## 2024-09-19 PROCEDURE — 99232 SBSQ HOSP IP/OBS MODERATE 35: CPT | Performed by: STUDENT IN AN ORGANIZED HEALTH CARE EDUCATION/TRAINING PROGRAM

## 2024-09-19 PROCEDURE — 6370000000 HC RX 637 (ALT 250 FOR IP): Performed by: STUDENT IN AN ORGANIZED HEALTH CARE EDUCATION/TRAINING PROGRAM

## 2024-09-19 PROCEDURE — 6370000000 HC RX 637 (ALT 250 FOR IP): Performed by: INTERNAL MEDICINE

## 2024-09-19 PROCEDURE — 97530 THERAPEUTIC ACTIVITIES: CPT

## 2024-09-19 PROCEDURE — 97110 THERAPEUTIC EXERCISES: CPT

## 2024-09-19 PROCEDURE — 99231 SBSQ HOSP IP/OBS SF/LOW 25: CPT | Performed by: STUDENT IN AN ORGANIZED HEALTH CARE EDUCATION/TRAINING PROGRAM

## 2024-09-19 PROCEDURE — 2580000003 HC RX 258: Performed by: PHYSICIAN ASSISTANT

## 2024-09-19 RX ADMIN — CARVEDILOL 37.5 MG: 25 TABLET, FILM COATED ORAL at 18:19

## 2024-09-19 RX ADMIN — APIXABAN 2.5 MG: 2.5 TABLET, FILM COATED ORAL at 09:26

## 2024-09-19 RX ADMIN — APIXABAN 2.5 MG: 2.5 TABLET, FILM COATED ORAL at 20:19

## 2024-09-19 RX ADMIN — SODIUM CHLORIDE, PRESERVATIVE FREE 10 ML: 5 INJECTION INTRAVENOUS at 09:30

## 2024-09-19 RX ADMIN — CARVEDILOL 37.5 MG: 25 TABLET, FILM COATED ORAL at 09:26

## 2024-09-19 RX ADMIN — TORSEMIDE 5 MG: 20 TABLET ORAL at 09:27

## 2024-09-19 RX ADMIN — TROSPIUM CHLORIDE 20 MG: 20 TABLET, FILM COATED ORAL at 09:27

## 2024-09-19 RX ADMIN — BUDESONIDE 3 MG: 3 CAPSULE, COATED PELLETS ORAL at 09:27

## 2024-09-19 RX ADMIN — ARIPIPRAZOLE 15 MG: 15 TABLET ORAL at 09:27

## 2024-09-19 RX ADMIN — FERROUS SULFATE TAB EC 325 MG (65 MG FE EQUIVALENT) 325 MG: 325 (65 FE) TABLET DELAYED RESPONSE at 09:27

## 2024-09-19 RX ADMIN — FERROUS SULFATE TAB EC 325 MG (65 MG FE EQUIVALENT) 325 MG: 325 (65 FE) TABLET DELAYED RESPONSE at 20:19

## 2024-09-19 RX ADMIN — NIFEDIPINE 60 MG: 60 TABLET, EXTENDED RELEASE ORAL at 09:26

## 2024-09-19 ASSESSMENT — PAIN SCALES - GENERAL: PAINLEVEL_OUTOF10: 8

## 2024-09-19 ASSESSMENT — PAIN DESCRIPTION - DESCRIPTORS: DESCRIPTORS: STABBING

## 2024-09-19 ASSESSMENT — PAIN DESCRIPTION - LOCATION: LOCATION: ABDOMEN;BACK

## 2024-09-19 ASSESSMENT — PAIN DESCRIPTION - ORIENTATION: ORIENTATION: ANTERIOR;POSTERIOR

## 2024-09-20 VITALS
WEIGHT: 315 LBS | HEIGHT: 73 IN | RESPIRATION RATE: 16 BRPM | DIASTOLIC BLOOD PRESSURE: 103 MMHG | TEMPERATURE: 97.5 F | OXYGEN SATURATION: 96 % | HEART RATE: 67 BPM | BODY MASS INDEX: 41.75 KG/M2 | SYSTOLIC BLOOD PRESSURE: 173 MMHG

## 2024-09-20 PROCEDURE — 6370000000 HC RX 637 (ALT 250 FOR IP): Performed by: INTERNAL MEDICINE

## 2024-09-20 PROCEDURE — 6370000000 HC RX 637 (ALT 250 FOR IP): Performed by: STUDENT IN AN ORGANIZED HEALTH CARE EDUCATION/TRAINING PROGRAM

## 2024-09-20 PROCEDURE — 99232 SBSQ HOSP IP/OBS MODERATE 35: CPT | Performed by: STUDENT IN AN ORGANIZED HEALTH CARE EDUCATION/TRAINING PROGRAM

## 2024-09-20 PROCEDURE — 6370000000 HC RX 637 (ALT 250 FOR IP): Performed by: PHYSICIAN ASSISTANT

## 2024-09-20 RX ADMIN — NIFEDIPINE 60 MG: 60 TABLET, EXTENDED RELEASE ORAL at 07:53

## 2024-09-20 RX ADMIN — ARIPIPRAZOLE 15 MG: 15 TABLET ORAL at 07:54

## 2024-09-20 RX ADMIN — CARVEDILOL 37.5 MG: 25 TABLET, FILM COATED ORAL at 10:28

## 2024-09-20 RX ADMIN — FERROUS SULFATE TAB EC 325 MG (65 MG FE EQUIVALENT) 325 MG: 325 (65 FE) TABLET DELAYED RESPONSE at 07:57

## 2024-09-20 RX ADMIN — CARVEDILOL 37.5 MG: 25 TABLET, FILM COATED ORAL at 07:53

## 2024-09-20 RX ADMIN — TORSEMIDE 5 MG: 20 TABLET ORAL at 07:53

## 2024-09-20 RX ADMIN — APIXABAN 2.5 MG: 2.5 TABLET, FILM COATED ORAL at 07:53

## 2024-09-20 RX ADMIN — TROSPIUM CHLORIDE 20 MG: 20 TABLET, FILM COATED ORAL at 07:53

## 2024-09-20 RX ADMIN — BUDESONIDE 3 MG: 3 CAPSULE, COATED PELLETS ORAL at 07:53

## 2024-09-20 ASSESSMENT — PAIN SCALES - GENERAL: PAINLEVEL_OUTOF10: 2

## 2024-09-27 ENCOUNTER — HOSPITAL ENCOUNTER (OUTPATIENT)
Age: 54
Setting detail: SPECIMEN
Discharge: HOME | End: 2024-09-27

## 2024-09-27 LAB
ANION GAP SERPL CALCULATED.3IONS-SCNC: 10 MMOL/L (ref 9–16)
BASOPHILS # BLD: 0.05 K/UL (ref 0–0.2)
BASOPHILS NFR BLD: 1 % (ref 0–2)
BUN SERPL-MCNC: 45 MG/DL (ref 6–20)
CALCIUM SERPL-MCNC: 8.3 MG/DL (ref 8.6–10.4)
CHLORIDE SERPL-SCNC: 107 MMOL/L (ref 98–107)
CO2 SERPL-SCNC: 24 MMOL/L (ref 20–31)
CREAT SERPL-MCNC: 2.6 MG/DL (ref 0.7–1.2)
EOSINOPHIL # BLD: 0.49 K/UL (ref 0–0.44)
EOSINOPHILS RELATIVE PERCENT: 7 % (ref 1–4)
ERYTHROCYTE [DISTWIDTH] IN BLOOD BY AUTOMATED COUNT: 15 % (ref 11.8–14.4)
GFR, ESTIMATED: 29 ML/MIN/1.73M2
GLUCOSE SERPL-MCNC: 105 MG/DL (ref 74–99)
HCT VFR BLD AUTO: 37.1 % (ref 40.7–50.3)
HGB BLD-MCNC: 11.1 G/DL (ref 13–17)
IMM GRANULOCYTES # BLD AUTO: 0.05 K/UL (ref 0–0.3)
IMM GRANULOCYTES NFR BLD: 1 %
LYMPHOCYTES NFR BLD: 0.96 K/UL (ref 1.1–3.7)
LYMPHOCYTES RELATIVE PERCENT: 13 % (ref 24–43)
MCH RBC QN AUTO: 28.8 PG (ref 25.2–33.5)
MCHC RBC AUTO-ENTMCNC: 29.9 G/DL (ref 28.4–34.8)
MCV RBC AUTO: 96.1 FL (ref 82.6–102.9)
MONOCYTES NFR BLD: 0.76 K/UL (ref 0.1–1.2)
MONOCYTES NFR BLD: 10 % (ref 3–12)
NEUTROPHILS NFR BLD: 68 % (ref 36–65)
NEUTS SEG NFR BLD: 5.28 K/UL (ref 1.5–8.1)
NRBC BLD-RTO: 0 PER 100 WBC
PLATELET # BLD AUTO: 134 K/UL (ref 138–453)
PMV BLD AUTO: 10.7 FL (ref 8.1–13.5)
POTASSIUM SERPL-SCNC: 4.7 MMOL/L (ref 3.7–5.3)
RBC # BLD AUTO: 3.86 M/UL (ref 4.21–5.77)
RBC # BLD: ABNORMAL 10*6/UL
SODIUM SERPL-SCNC: 141 MMOL/L (ref 136–145)
WBC OTHER # BLD: 7.6 K/UL (ref 3.5–11.3)

## 2024-09-27 PROCEDURE — 80048 BASIC METABOLIC PNL TOTAL CA: CPT

## 2024-09-27 PROCEDURE — 36415 COLL VENOUS BLD VENIPUNCTURE: CPT

## 2024-09-27 PROCEDURE — 85025 COMPLETE CBC W/AUTO DIFF WBC: CPT

## 2024-09-27 PROCEDURE — P9603 ONE-WAY ALLOW PRORATED MILES: HCPCS

## 2024-10-03 ENCOUNTER — HOSPITAL ENCOUNTER (OUTPATIENT)
Age: 54
Setting detail: SPECIMEN
Discharge: HOME | End: 2024-10-03

## 2024-10-03 ENCOUNTER — HOSPITAL ENCOUNTER (EMERGENCY)
Age: 54
Discharge: ANOTHER HOSPITAL | End: 2024-10-04
Attending: EMERGENCY MEDICINE
Payer: COMMERCIAL

## 2024-10-03 DIAGNOSIS — A41.9 SEPTICEMIA (HCC): ICD-10-CM

## 2024-10-03 DIAGNOSIS — R41.82 ALTERED MENTAL STATUS, UNSPECIFIED ALTERED MENTAL STATUS TYPE: Primary | ICD-10-CM

## 2024-10-03 LAB
ALBUMIN SERPL-MCNC: 3 G/DL (ref 3.5–5.2)
ALP SERPL-CCNC: 52 U/L (ref 40–129)
ALT SERPL-CCNC: 14 U/L (ref 5–41)
AMMONIA PLAS-SCNC: 43 UMOL/L (ref 16–60)
ANION GAP SERPL CALCULATED.3IONS-SCNC: 10 MMOL/L (ref 9–16)
ANION GAP SERPL CALCULATED.3IONS-SCNC: 8 MMOL/L (ref 9–17)
APAP SERPL-MCNC: <5 UG/ML (ref 10–30)
AST SERPL-CCNC: 22 U/L
BASOPHILS # BLD: 0 K/UL (ref 0–0.2)
BASOPHILS NFR BLD: 0 % (ref 0–2)
BILIRUB SERPL-MCNC: 0.4 MG/DL (ref 0.3–1.2)
BUN SERPL-MCNC: 45 MG/DL (ref 6–20)
BUN SERPL-MCNC: 56 MG/DL (ref 6–20)
CALCIUM SERPL-MCNC: 8.2 MG/DL (ref 8.6–10.4)
CALCIUM SERPL-MCNC: 8.8 MG/DL (ref 8.6–10.4)
CHLORIDE SERPL-SCNC: 108 MMOL/L (ref 98–107)
CHLORIDE SERPL-SCNC: 110 MMOL/L (ref 98–107)
CO2 SERPL-SCNC: 24 MMOL/L (ref 20–31)
CO2 SERPL-SCNC: 24 MMOL/L (ref 20–31)
CREAT SERPL-MCNC: 2.9 MG/DL (ref 0.7–1.2)
CREAT SERPL-MCNC: 4.5 MG/DL (ref 0.7–1.2)
EOSINOPHIL # BLD: 0.15 K/UL (ref 0–0.4)
EOSINOPHILS RELATIVE PERCENT: 1 % (ref 1–4)
ERYTHROCYTE [DISTWIDTH] IN BLOOD BY AUTOMATED COUNT: 15.3 % (ref 11.8–14.4)
ETHANOL PERCENT: <0.01 %
ETHANOLAMINE SERPL-MCNC: <10 MG/DL (ref 0–0.08)
GFR, ESTIMATED: 15 ML/MIN/1.73M2
GFR, ESTIMATED: 25 ML/MIN/1.73M2
GLUCOSE SERPL-MCNC: 113 MG/DL (ref 70–99)
GLUCOSE SERPL-MCNC: 113 MG/DL (ref 74–99)
HCT VFR BLD AUTO: 37.2 % (ref 40.7–50.3)
HGB BLD-MCNC: 11.1 G/DL (ref 13–17)
IMM GRANULOCYTES # BLD AUTO: 0 K/UL (ref 0–0.3)
IMM GRANULOCYTES NFR BLD: 0 %
LYMPHOCYTES NFR BLD: 0.45 K/UL (ref 1–4.8)
LYMPHOCYTES RELATIVE PERCENT: 3 % (ref 24–44)
MAGNESIUM SERPL-MCNC: 2.2 MG/DL (ref 1.6–2.6)
MCH RBC QN AUTO: 29.2 PG (ref 25.2–33.5)
MCHC RBC AUTO-ENTMCNC: 29.8 G/DL (ref 28.4–34.8)
MCV RBC AUTO: 97.9 FL (ref 82.6–102.9)
MONOCYTES NFR BLD: 0.3 K/UL (ref 0.1–0.8)
MONOCYTES NFR BLD: 2 % (ref 1–7)
MORPHOLOGY: ABNORMAL
NEUTROPHILS NFR BLD: 94 % (ref 36–66)
NEUTS SEG NFR BLD: 14.2 K/UL (ref 1.8–7.7)
NRBC BLD-RTO: 0 PER 100 WBC
PHOSPHATE SERPL-MCNC: 3.8 MG/DL (ref 2.5–4.5)
PLATELET # BLD AUTO: 190 K/UL (ref 138–453)
PMV BLD AUTO: 10.3 FL (ref 8.1–13.5)
POTASSIUM SERPL-SCNC: 5.1 MMOL/L (ref 3.7–5.3)
POTASSIUM SERPL-SCNC: 5.7 MMOL/L (ref 3.7–5.3)
PROT SERPL-MCNC: 6.5 G/DL (ref 6.4–8.3)
RBC # BLD AUTO: 3.8 M/UL (ref 4.21–5.77)
RBC # BLD: ABNORMAL 10*6/UL
SALICYLATES SERPL-MCNC: <1 MG/DL (ref 3–10)
SODIUM SERPL-SCNC: 142 MMOL/L (ref 135–144)
SODIUM SERPL-SCNC: 142 MMOL/L (ref 136–145)
TROPONIN I SERPL HS-MCNC: 106 NG/L (ref 0–22)
WBC OTHER # BLD: 15.1 K/UL (ref 3.5–11.3)

## 2024-10-03 PROCEDURE — 2580000003 HC RX 258

## 2024-10-03 PROCEDURE — 93005 ELECTROCARDIOGRAM TRACING: CPT

## 2024-10-03 PROCEDURE — 82140 ASSAY OF AMMONIA: CPT

## 2024-10-03 PROCEDURE — 83605 ASSAY OF LACTIC ACID: CPT

## 2024-10-03 PROCEDURE — 80179 DRUG ASSAY SALICYLATE: CPT

## 2024-10-03 PROCEDURE — 36415 COLL VENOUS BLD VENIPUNCTURE: CPT

## 2024-10-03 PROCEDURE — G0480 DRUG TEST DEF 1-7 CLASSES: HCPCS

## 2024-10-03 PROCEDURE — 80143 DRUG ASSAY ACETAMINOPHEN: CPT

## 2024-10-03 PROCEDURE — 84100 ASSAY OF PHOSPHORUS: CPT

## 2024-10-03 PROCEDURE — 83735 ASSAY OF MAGNESIUM: CPT

## 2024-10-03 PROCEDURE — 80048 BASIC METABOLIC PNL TOTAL CA: CPT

## 2024-10-03 PROCEDURE — P9603 ONE-WAY ALLOW PRORATED MILES: HCPCS

## 2024-10-03 PROCEDURE — 96366 THER/PROPH/DIAG IV INF ADDON: CPT

## 2024-10-03 PROCEDURE — 85025 COMPLETE CBC W/AUTO DIFF WBC: CPT

## 2024-10-03 PROCEDURE — 80053 COMPREHEN METABOLIC PANEL: CPT

## 2024-10-03 PROCEDURE — 99285 EMERGENCY DEPT VISIT HI MDM: CPT

## 2024-10-03 PROCEDURE — 84484 ASSAY OF TROPONIN QUANT: CPT

## 2024-10-03 PROCEDURE — 82805 BLOOD GASES W/O2 SATURATION: CPT

## 2024-10-03 RX ORDER — 0.9 % SODIUM CHLORIDE 0.9 %
30 INTRAVENOUS SOLUTION INTRAVENOUS ONCE
Status: COMPLETED | OUTPATIENT
Start: 2024-10-03 | End: 2024-10-04

## 2024-10-03 RX ADMIN — SODIUM CHLORIDE 2385 ML: 9 INJECTION, SOLUTION INTRAVENOUS at 23:14

## 2024-10-04 ENCOUNTER — APPOINTMENT (OUTPATIENT)
Dept: GENERAL RADIOLOGY | Age: 54
End: 2024-10-04
Payer: COMMERCIAL

## 2024-10-04 ENCOUNTER — APPOINTMENT (OUTPATIENT)
Dept: CT IMAGING | Age: 54
End: 2024-10-04
Payer: COMMERCIAL

## 2024-10-04 VITALS
WEIGHT: 315 LBS | RESPIRATION RATE: 20 BRPM | BODY MASS INDEX: 60.3 KG/M2 | HEART RATE: 112 BPM | OXYGEN SATURATION: 97 % | TEMPERATURE: 102.4 F | SYSTOLIC BLOOD PRESSURE: 100 MMHG | DIASTOLIC BLOOD PRESSURE: 66 MMHG

## 2024-10-04 LAB
ABSOLUTE BANDS: 2.84 K/UL (ref 0–1)
ARTERIAL PATENCY WRIST A: NORMAL
BACTERIA URNS QL MICRO: ABNORMAL
BANDS: 12 % (ref 0–10)
BASOPHILS # BLD: 0 K/UL (ref 0–0.2)
BASOPHILS NFR BLD: 0 % (ref 0–2)
BILIRUB UR QL STRIP: NEGATIVE
CASTS #/AREA URNS LPF: ABNORMAL /LPF
CLARITY UR: CLEAR
COHGB MFR BLD: 2.3 % (ref 0–5)
COLOR UR: ABNORMAL
EOSINOPHIL # BLD: 0 K/UL (ref 0–0.4)
EOSINOPHILS RELATIVE PERCENT: 0 % (ref 0–4)
EPI CELLS #/AREA URNS HPF: ABNORMAL /HPF
ERYTHROCYTE [DISTWIDTH] IN BLOOD BY AUTOMATED COUNT: 16.9 % (ref 11.5–14.9)
FLUAV RNA RESP QL NAA+PROBE: NOT DETECTED
FLUBV RNA RESP QL NAA+PROBE: NOT DETECTED
GAS FLOW.O2 O2 DELIVERY SYS: NORMAL L/MIN
GLUCOSE UR STRIP-MCNC: NEGATIVE MG/DL
HCO3 VENOUS: 25.5 MMOL/L (ref 24–30)
HCT VFR BLD AUTO: 33.9 % (ref 41–53)
HGB BLD-MCNC: 10.8 G/DL (ref 13.5–17.5)
HGB UR QL STRIP.AUTO: NEGATIVE
KETONES UR STRIP-MCNC: ABNORMAL MG/DL
LACTATE BLDV-SCNC: 2.9 MMOL/L (ref 0.5–2.2)
LACTATE BLDV-SCNC: 3 MMOL/L (ref 0.5–2.2)
LEUKOCYTE ESTERASE UR QL STRIP: NEGATIVE
LYMPHOCYTES NFR BLD: 1.66 K/UL (ref 1–4.8)
LYMPHOCYTES RELATIVE PERCENT: 7 % (ref 24–44)
MCH RBC QN AUTO: 29.9 PG (ref 26–34)
MCHC RBC AUTO-ENTMCNC: 32 G/DL (ref 31–37)
MCV RBC AUTO: 93.6 FL (ref 80–100)
METAMYELOCYTES ABSOLUTE COUNT: 0.47 K/UL
METAMYELOCYTES: 2 %
METHEMOGLOBIN: 0.4 % (ref 0–1.9)
MONOCYTES NFR BLD: 0.47 K/UL (ref 0.1–1.3)
MONOCYTES NFR BLD: 2 % (ref 1–7)
MORPHOLOGY: NORMAL
NEGATIVE BASE EXCESS, VEN: 0.2 MMOL/L (ref 0–2)
NEUTROPHILS NFR BLD: 77 % (ref 36–66)
NEUTS SEG NFR BLD: 18.26 K/UL (ref 1.3–9.1)
NITRITE UR QL STRIP: NEGATIVE
O2 SAT, VEN: 73.7 % (ref 60–85)
PCO2 VENOUS: 46.7 MM HG (ref 39–55)
PH UR STRIP: 5 [PH] (ref 5–8)
PH VENOUS: 7.34 (ref 7.32–7.42)
PLATELET # BLD AUTO: 184 K/UL (ref 150–450)
PMV BLD AUTO: 7.6 FL (ref 6–12)
PO2 VENOUS: 39.4 MM HG (ref 30–50)
PROT UR STRIP-MCNC: ABNORMAL MG/DL
RBC # BLD AUTO: 3.62 M/UL (ref 4.5–5.9)
RBC #/AREA URNS HPF: ABNORMAL /HPF
SARS-COV-2 RNA RESP QL NAA+PROBE: NOT DETECTED
SOURCE: NORMAL
SP GR UR STRIP: 1.02 (ref 1–1.03)
SPECIMEN DESCRIPTION: NORMAL
TEXT FOR RESPIRATORY: NORMAL
TRICYCLIC ANTIDEPRESSANTS, UR: NEGATIVE
TROPONIN I SERPL HS-MCNC: 99 NG/L (ref 0–22)
UROBILINOGEN UR STRIP-ACNC: NORMAL EU/DL (ref 0–1)
WBC #/AREA URNS HPF: ABNORMAL /HPF
WBC OTHER # BLD: 23.7 K/UL (ref 3.5–11)

## 2024-10-04 PROCEDURE — 96366 THER/PROPH/DIAG IV INF ADDON: CPT

## 2024-10-04 PROCEDURE — 96365 THER/PROPH/DIAG IV INF INIT: CPT

## 2024-10-04 PROCEDURE — 94761 N-INVAS EAR/PLS OXIMETRY MLT: CPT

## 2024-10-04 PROCEDURE — 84484 ASSAY OF TROPONIN QUANT: CPT

## 2024-10-04 PROCEDURE — 2580000003 HC RX 258

## 2024-10-04 PROCEDURE — 6370000000 HC RX 637 (ALT 250 FOR IP): Performed by: EMERGENCY MEDICINE

## 2024-10-04 PROCEDURE — 81001 URINALYSIS AUTO W/SCOPE: CPT

## 2024-10-04 PROCEDURE — 36415 COLL VENOUS BLD VENIPUNCTURE: CPT

## 2024-10-04 PROCEDURE — 87040 BLOOD CULTURE FOR BACTERIA: CPT

## 2024-10-04 PROCEDURE — 80307 DRUG TEST PRSMV CHEM ANLYZR: CPT

## 2024-10-04 PROCEDURE — 82800 BLOOD PH: CPT

## 2024-10-04 PROCEDURE — 83605 ASSAY OF LACTIC ACID: CPT

## 2024-10-04 PROCEDURE — 71045 X-RAY EXAM CHEST 1 VIEW: CPT

## 2024-10-04 PROCEDURE — 6360000002 HC RX W HCPCS

## 2024-10-04 PROCEDURE — 2700000000 HC OXYGEN THERAPY PER DAY

## 2024-10-04 PROCEDURE — 2580000003 HC RX 258: Performed by: EMERGENCY MEDICINE

## 2024-10-04 PROCEDURE — 87636 SARSCOV2 & INF A&B AMP PRB: CPT

## 2024-10-04 RX ORDER — ACETAMINOPHEN 650 MG/1
650 SUPPOSITORY RECTAL ONCE
Status: COMPLETED | OUTPATIENT
Start: 2024-10-04 | End: 2024-10-04

## 2024-10-04 RX ORDER — SODIUM CHLORIDE 9 MG/ML
INJECTION, SOLUTION INTRAVENOUS CONTINUOUS
Status: DISCONTINUED | OUTPATIENT
Start: 2024-10-04 | End: 2024-10-04 | Stop reason: HOSPADM

## 2024-10-04 RX ADMIN — VANCOMYCIN HYDROCHLORIDE 2000 MG: 1 INJECTION, POWDER, LYOPHILIZED, FOR SOLUTION INTRAVENOUS at 01:13

## 2024-10-04 RX ADMIN — ACETAMINOPHEN 650 MG: 650 SUPPOSITORY RECTAL at 04:15

## 2024-10-04 RX ADMIN — PIPERACILLIN AND TAZOBACTAM 4500 MG: 4; .5 INJECTION, POWDER, LYOPHILIZED, FOR SOLUTION INTRAVENOUS at 00:22

## 2024-10-04 RX ADMIN — SODIUM CHLORIDE: 9 INJECTION, SOLUTION INTRAVENOUS at 03:42

## 2024-10-04 RX ADMIN — ACETAMINOPHEN 650 MG: 650 SUPPOSITORY RECTAL at 00:53

## 2024-10-04 NOTE — ED NOTES
Report given to GLORIA Pearson from [facility].   Report method by phone   The following was reviewed with receiving RN:   Current vital signs:  BP (!) 123/58   Pulse (!) 119   Temp (!) 102.4 °F (39.1 °C)   Resp 22   Wt (!) 206.4 kg (455 lb)   SpO2 95%   BMI 60.30 kg/m²                      Any medication or safety alerts were reviewed. Any pending diagnostics and notifications were also reviewed, as well as any safety concerns or issues, abnormal labs, abnormal imaging, and abnormal assessment findings. Questions were answered.

## 2024-10-04 NOTE — ED NOTES
Pt responsive to painful stimuli and loudly shouting his name. VBG shows adequate ventilation, pulse ox shows adequate oxygenation on 3LNC. Pt has sonorous breathing. RT placed a 30Fr nasal trumpet in the pt's right nare for airway protection when asleep, snoring stopped. Dr. Rosales updated.

## 2024-10-04 NOTE — ED NOTES
Report given to GLORIA Oliva from ER.   Report method in person   The following was reviewed with receiving RN:   Current vital signs:  /66   Pulse (!) 112   Temp (!) 102.4 °F (39.1 °C)   Resp 20   Wt (!) 206.4 kg (455 lb)   SpO2 97%   BMI 60.30 kg/m²                      Any medication or safety alerts were reviewed. Any pending diagnostics and notifications were also reviewed, as well as any safety concerns or issues, abnormal labs, abnormal imaging, and abnormal assessment findings. Questions were answered.

## 2024-10-04 NOTE — ED NOTES
Writer called lab for blood culture draw and spoke with kriss Oquendo. Writer informed that they only have one phlebotomist tonight. Writer then attempted to call phleb direct # at [phone] and received no answer.

## 2024-10-04 NOTE — ED PROVIDER NOTES
[facility] ED  Emergency Department Encounter  Emergency Medicine Resident     Pt Name:Stevo Avilez  MRN: [id number]  Birthdate 1970  Date of evaluation: 10/3/24  PCP:  Michael Godinez APRN - CNP  Note Started: 11:02 PM EDT      CHIEF COMPLAINT       Chief Complaint   Patient presents with    Loss of Consciousness    Fever       HISTORY OF PRESENT ILLNESS  (Location/Symptom, Timing/Onset, Context/Setting, Quality, Duration, Modifying Factors, Severity.)      Stevo Avilez is a 54 y.o. male who presents with altered mental status and decreased mentation as well as fever.  Patient is from [facility] and was thought to be sleeping most of the day.  Only responsive to painful stimuli.  Patient has had decreased responsiveness since 7 AM, 19 reassessed patient and was concerned and transferred him for assessment.  Noted to be febrile at the time.  Patient placed on oxygen for desaturation to the 80s.    PAST MEDICAL / SURGICAL / SOCIAL / FAMILY HISTORY      has a past medical history of Bipolar 1 disorder (HCC), Hypertension, IgA nephropathy, Iron deficiency anemia, Obesity (BMI 35.0-39.9 without comorbidity), PRIMO (obstructive sleep apnea), Prediabetes, Stage 3b chronic kidney disease (HCC), and Varicose veins of legs.     has no past surgical history on file.      Social History     Socioeconomic History    Marital status:      Spouse name: Not on file    Number of children: Not on file    Years of education: Not on file    Highest education level: Not on file   Occupational History    Not on file   Tobacco Use    Smoking status: Never    Smokeless tobacco: Never   Substance and Sexual Activity    Alcohol use: Yes    Drug use: No    Sexual activity: Not on file   Other Topics Concern    Not on file   Social History Narrative    Not on file     Social Determinants of Health     Financial Resource Strain: Medium Risk (2/18/2024)    [facility]     CBC, CMP, ammonia, troponins, EKG, chest x-ray, will plan for CT of the head and possibly right thigh.  Urinalysis.  Will initiate IV fluids at the 30 cc/kg of ideal body weight as well as vancomycin and Zosyn given unknown source.    Patient's VBG with pH of 7.36 and CO2 47.  Given stable VBG patient GCS of 10 will hold off on intubation at this time.  Low threshold for intubation if patient becomes increasingly drowsy or unresponsive.    Amount and/or Complexity of Data Reviewed  Labs: ordered.  Radiology: ordered.  ECG/medicine tests: ordered.    Risk  OTC drugs.  Prescription drug management.        EKG  EKG Interpretation    Interpreted by emergency department physician    Rhythm: normal sinus   Rate: normal  Axis: left  Ectopy: none  Conduction: normal,   ST Segments: no acute change  T Waves: no acute change  Q Waves: none    Clinical Impression: no acute changes and non-specific EKG    Courtney Menard MD      All EKG's are interpreted by the Emergency Department Physician who either signs or Co-signs this chart in the absence of a cardiologist.    EMERGENCY DEPARTMENT COURSE:      ED Course as of 10/04/24 0343   Fri Oct 04, 2024   0010 Patient's labs significant for WBC 23.7.  Lactate of 3.  Troponins elevated at 104 however patient with worsening JONNA on CKD with creatinine of 4.5, BUN elevated at 56, sodium 142 and potassium 5.7.  No significant changes on EKG.  Will plan for fluids and repeat to see if patient requires correction of potassium. [NS]   0130 Patient went for CT but was unable to have CT completed given his weight.  Patient will also not fit into the CT scanner to [facility] and will require transfer to [facility].  Will initiate transfer.    Patient reviewed with Dr. Wright at [facility] believes patient would be better ICU admission with CT on route up.  Patient reviewed with Dr. Butterfield critical care you [facility] who is accepted the patient

## 2024-10-04 NOTE — ED NOTES
Writer spoke with Brandi, phlebotomist at 1652 and informed her of need for 2x sets of blood culture draws for pt.

## 2024-10-04 NOTE — ED TRIAGE NOTES
Mode of arrival (squad #, walk in, police, etc) : EMS        Chief complaint(s): Unconscious        Arrival Note (brief scenario, treatment PTA, etc).: Pt. Arrived to ER via EMS only responsive to painful stimulti. Per EMS report this patient was unresponsive since around 7AM . Pt. From [facility]. Pt. Has enlarged red and warm right upper leg. Pt. If febrile at 103.5. Pt. Has audible snoring upon arrival. Pt. Is now on 3L per NC.

## 2024-10-04 NOTE — ED NOTES
RN called [facility] for report. Per facility patient was acting appropriate at 2300 the night before. Facility stated that he was lethargic at 7am when they did morning rounds. Pt then did not wake up for breakfast or lunch. New staff came on at 1500 and stated that pt was not able to be arousal . Facility stated that they tried to do multiple sternal rubs throughout the day/night. DON of facility came in and stated that patient needed to come into the ED.

## 2024-10-05 LAB
EKG ATRIAL RATE: 88 BPM
EKG P AXIS: 26 DEGREES
EKG P-R INTERVAL: 200 MS
EKG Q-T INTERVAL: 354 MS
EKG QRS DURATION: 106 MS
EKG QTC CALCULATION (BAZETT): 428 MS
EKG R AXIS: -49 DEGREES
EKG T AXIS: 68 DEGREES
EKG VENTRICULAR RATE: 88 BPM

## 2024-10-05 PROCEDURE — 93010 ELECTROCARDIOGRAM REPORT: CPT | Performed by: INTERNAL MEDICINE

## 2024-10-06 LAB
MICROORGANISM SPEC CULT: NORMAL
MICROORGANISM SPEC CULT: NORMAL
SERVICE CMNT-IMP: NORMAL
SERVICE CMNT-IMP: NORMAL
SPECIMEN DESCRIPTION: NORMAL
SPECIMEN DESCRIPTION: NORMAL

## 2024-10-10 ENCOUNTER — HOSPITAL ENCOUNTER (OUTPATIENT)
Age: 54
Setting detail: SPECIMEN
Discharge: HOME | End: 2024-10-10

## 2024-10-29 ENCOUNTER — HOSPITAL ENCOUNTER (OUTPATIENT)
Age: 54
Setting detail: SPECIMEN
Discharge: HOME | End: 2024-10-29

## 2024-10-29 LAB
ANION GAP SERPL CALCULATED.3IONS-SCNC: 14 MMOL/L (ref 9–16)
BASOPHILS # BLD: 0.13 K/UL (ref 0–0.2)
BASOPHILS NFR BLD: 1 % (ref 0–2)
BUN SERPL-MCNC: 40 MG/DL (ref 6–20)
CALCIUM SERPL-MCNC: 9.1 MG/DL (ref 8.6–10.4)
CHLORIDE SERPL-SCNC: 100 MMOL/L (ref 98–107)
CO2 SERPL-SCNC: 24 MMOL/L (ref 20–31)
CREAT SERPL-MCNC: 3.6 MG/DL (ref 0.7–1.2)
EOSINOPHIL # BLD: 1.36 K/UL (ref 0–0.44)
EOSINOPHILS RELATIVE PERCENT: 15 % (ref 1–4)
ERYTHROCYTE [DISTWIDTH] IN BLOOD BY AUTOMATED COUNT: 15.7 % (ref 11.8–14.4)
GFR, ESTIMATED: 20 ML/MIN/1.73M2
GLUCOSE SERPL-MCNC: 96 MG/DL (ref 74–99)
HCT VFR BLD AUTO: 36.5 % (ref 40.7–50.3)
HGB BLD-MCNC: 10.9 G/DL (ref 13–17)
IMM GRANULOCYTES # BLD AUTO: 0.14 K/UL (ref 0–0.3)
IMM GRANULOCYTES NFR BLD: 2 %
LYMPHOCYTES NFR BLD: 2.08 K/UL (ref 1.1–3.7)
LYMPHOCYTES RELATIVE PERCENT: 23 % (ref 24–43)
MCH RBC QN AUTO: 28.3 PG (ref 25.2–33.5)
MCHC RBC AUTO-ENTMCNC: 29.9 G/DL (ref 28.4–34.8)
MCV RBC AUTO: 94.8 FL (ref 82.6–102.9)
MONOCYTES NFR BLD: 0.82 K/UL (ref 0.1–1.2)
MONOCYTES NFR BLD: 9 % (ref 3–12)
NEUTROPHILS NFR BLD: 50 % (ref 36–65)
NEUTS SEG NFR BLD: 4.45 K/UL (ref 1.5–8.1)
NRBC BLD-RTO: 0 PER 100 WBC
PLATELET # BLD AUTO: 114 K/UL (ref 138–453)
PMV BLD AUTO: 10.3 FL (ref 8.1–13.5)
POTASSIUM SERPL-SCNC: 4.7 MMOL/L (ref 3.7–5.3)
RBC # BLD AUTO: 3.85 M/UL (ref 4.21–5.77)
RBC # BLD: ABNORMAL 10*6/UL
SODIUM SERPL-SCNC: 138 MMOL/L (ref 136–145)
WBC OTHER # BLD: 9 K/UL (ref 3.5–11.3)

## 2024-10-29 PROCEDURE — 80048 BASIC METABOLIC PNL TOTAL CA: CPT

## 2024-10-29 PROCEDURE — P9603 ONE-WAY ALLOW PRORATED MILES: HCPCS

## 2024-10-29 PROCEDURE — 85025 COMPLETE CBC W/AUTO DIFF WBC: CPT

## 2024-10-29 PROCEDURE — 36415 COLL VENOUS BLD VENIPUNCTURE: CPT

## 2024-10-31 ENCOUNTER — HOSPITAL ENCOUNTER (OUTPATIENT)
Age: 54
Setting detail: SPECIMEN
Discharge: HOME | End: 2024-10-31

## 2024-10-31 LAB
ANION GAP SERPL CALCULATED.3IONS-SCNC: 11 MMOL/L (ref 9–16)
BUN SERPL-MCNC: 40 MG/DL (ref 6–20)
CALCIUM SERPL-MCNC: 8.7 MG/DL (ref 8.6–10.4)
CHLORIDE SERPL-SCNC: 101 MMOL/L (ref 98–107)
CO2 SERPL-SCNC: 27 MMOL/L (ref 20–31)
CREAT SERPL-MCNC: 3.8 MG/DL (ref 0.7–1.2)
GFR, ESTIMATED: 18 ML/MIN/1.73M2
GLUCOSE SERPL-MCNC: 91 MG/DL (ref 74–99)
POTASSIUM SERPL-SCNC: 4.4 MMOL/L (ref 3.7–5.3)
SODIUM SERPL-SCNC: 139 MMOL/L (ref 136–145)

## 2024-10-31 PROCEDURE — P9603 ONE-WAY ALLOW PRORATED MILES: HCPCS

## 2024-10-31 PROCEDURE — 36415 COLL VENOUS BLD VENIPUNCTURE: CPT

## 2024-10-31 PROCEDURE — 80048 BASIC METABOLIC PNL TOTAL CA: CPT

## 2024-11-02 ENCOUNTER — HOSPITAL ENCOUNTER (OUTPATIENT)
Age: 54
Setting detail: SPECIMEN
Discharge: HOME | End: 2024-11-02

## 2024-11-02 LAB
ANION GAP SERPL CALCULATED.3IONS-SCNC: 13 MMOL/L (ref 9–17)
BUN SERPL-MCNC: 41 MG/DL (ref 6–20)
BUN/CREAT SERPL: 12 (ref 9–20)
CALCIUM SERPL-MCNC: 8.7 MG/DL (ref 8.6–10.4)
CHLORIDE SERPL-SCNC: 100 MMOL/L (ref 98–107)
CO2 SERPL-SCNC: 27 MMOL/L (ref 20–31)
CREAT SERPL-MCNC: 3.3 MG/DL (ref 0.7–1.2)
GFR, ESTIMATED: 22 ML/MIN/1.73M2
GLUCOSE SERPL-MCNC: 118 MG/DL (ref 70–99)
POTASSIUM SERPL-SCNC: 4.1 MMOL/L (ref 3.7–5.3)
SODIUM SERPL-SCNC: 140 MMOL/L (ref 135–144)

## 2024-11-02 PROCEDURE — 80048 BASIC METABOLIC PNL TOTAL CA: CPT

## 2024-11-02 PROCEDURE — P9603 ONE-WAY ALLOW PRORATED MILES: HCPCS

## 2024-11-02 PROCEDURE — 36415 COLL VENOUS BLD VENIPUNCTURE: CPT

## 2024-11-05 ENCOUNTER — HOSPITAL ENCOUNTER (OUTPATIENT)
Age: 54
Setting detail: SPECIMEN
Discharge: HOME | End: 2024-11-05

## 2024-11-05 LAB
ANION GAP SERPL CALCULATED.3IONS-SCNC: 13 MMOL/L (ref 9–16)
BASOPHILS # BLD: 0.11 K/UL (ref 0–0.2)
BASOPHILS NFR BLD: 1 % (ref 0–2)
BUN SERPL-MCNC: 40 MG/DL (ref 6–20)
CALCIUM SERPL-MCNC: 9 MG/DL (ref 8.6–10.4)
CHLORIDE SERPL-SCNC: 101 MMOL/L (ref 98–107)
CO2 SERPL-SCNC: 26 MMOL/L (ref 20–31)
CREAT SERPL-MCNC: 3.2 MG/DL (ref 0.7–1.2)
EOSINOPHIL # BLD: 1.76 K/UL (ref 0–0.44)
EOSINOPHILS RELATIVE PERCENT: 15 % (ref 1–4)
ERYTHROCYTE [DISTWIDTH] IN BLOOD BY AUTOMATED COUNT: 15.2 % (ref 11.8–14.4)
GFR, ESTIMATED: 22 ML/MIN/1.73M2
GLUCOSE SERPL-MCNC: 116 MG/DL (ref 74–99)
HCT VFR BLD AUTO: 36.4 % (ref 40.7–50.3)
HGB BLD-MCNC: 10.6 G/DL (ref 13–17)
IMM GRANULOCYTES # BLD AUTO: 0.13 K/UL (ref 0–0.3)
IMM GRANULOCYTES NFR BLD: 1 %
LYMPHOCYTES NFR BLD: 1.83 K/UL (ref 1.1–3.7)
LYMPHOCYTES RELATIVE PERCENT: 16 % (ref 24–43)
MCH RBC QN AUTO: 28.1 PG (ref 25.2–33.5)
MCHC RBC AUTO-ENTMCNC: 29.1 G/DL (ref 28.4–34.8)
MCV RBC AUTO: 96.6 FL (ref 82.6–102.9)
MONOCYTES NFR BLD: 1 K/UL (ref 0.1–1.2)
MONOCYTES NFR BLD: 9 % (ref 3–12)
NEUTROPHILS NFR BLD: 58 % (ref 36–65)
NEUTS SEG NFR BLD: 6.58 K/UL (ref 1.5–8.1)
NRBC BLD-RTO: 0 PER 100 WBC
PLATELET # BLD AUTO: 110 K/UL (ref 138–453)
PMV BLD AUTO: 11.9 FL (ref 8.1–13.5)
POTASSIUM SERPL-SCNC: 3.8 MMOL/L (ref 3.7–5.3)
RBC # BLD AUTO: 3.77 M/UL (ref 4.21–5.77)
RBC # BLD: ABNORMAL 10*6/UL
SODIUM SERPL-SCNC: 140 MMOL/L (ref 136–145)
WBC OTHER # BLD: 11.4 K/UL (ref 3.5–11.3)

## 2024-11-05 PROCEDURE — P9603 ONE-WAY ALLOW PRORATED MILES: HCPCS

## 2024-11-05 PROCEDURE — 80048 BASIC METABOLIC PNL TOTAL CA: CPT

## 2024-11-05 PROCEDURE — 85025 COMPLETE CBC W/AUTO DIFF WBC: CPT

## 2024-11-05 PROCEDURE — 36415 COLL VENOUS BLD VENIPUNCTURE: CPT

## 2024-11-12 ENCOUNTER — HOSPITAL ENCOUNTER (OUTPATIENT)
Age: 54
Setting detail: SPECIMEN
Discharge: HOME | End: 2024-11-12

## 2024-11-12 LAB
ANION GAP SERPL CALCULATED.3IONS-SCNC: 12 MMOL/L (ref 9–16)
BASOPHILS # BLD: 0.03 K/UL (ref 0–0.2)
BASOPHILS NFR BLD: 0 % (ref 0–2)
BUN SERPL-MCNC: 35 MG/DL (ref 6–20)
CALCIUM SERPL-MCNC: 8.5 MG/DL (ref 8.6–10.4)
CHLORIDE SERPL-SCNC: 102 MMOL/L (ref 98–107)
CO2 SERPL-SCNC: 28 MMOL/L (ref 20–31)
CREAT SERPL-MCNC: 2.2 MG/DL (ref 0.7–1.2)
EOSINOPHIL # BLD: 0.57 K/UL (ref 0–0.44)
EOSINOPHILS RELATIVE PERCENT: 5 % (ref 1–4)
ERYTHROCYTE [DISTWIDTH] IN BLOOD BY AUTOMATED COUNT: 14.8 % (ref 11.8–14.4)
GFR, ESTIMATED: 35 ML/MIN/1.73M2
GLUCOSE SERPL-MCNC: 127 MG/DL (ref 74–99)
HCT VFR BLD AUTO: 33.2 % (ref 40.7–50.3)
HGB BLD-MCNC: 10 G/DL (ref 13–17)
IMM GRANULOCYTES # BLD AUTO: 0.12 K/UL (ref 0–0.3)
IMM GRANULOCYTES NFR BLD: 1 %
LYMPHOCYTES NFR BLD: 1.69 K/UL (ref 1.1–3.7)
LYMPHOCYTES RELATIVE PERCENT: 14 % (ref 24–43)
MCH RBC QN AUTO: 28.7 PG (ref 25.2–33.5)
MCHC RBC AUTO-ENTMCNC: 30.1 G/DL (ref 28.4–34.8)
MCV RBC AUTO: 95.1 FL (ref 82.6–102.9)
MONOCYTES NFR BLD: 0.37 K/UL (ref 0.1–1.2)
MONOCYTES NFR BLD: 3 % (ref 3–12)
NEUTROPHILS NFR BLD: 77 % (ref 36–65)
NEUTS SEG NFR BLD: 9.1 K/UL (ref 1.5–8.1)
NRBC BLD-RTO: 0 PER 100 WBC
PLATELET # BLD AUTO: 96 K/UL (ref 138–453)
PMV BLD AUTO: 11.6 FL (ref 8.1–13.5)
POTASSIUM SERPL-SCNC: 3.8 MMOL/L (ref 3.7–5.3)
RBC # BLD AUTO: 3.49 M/UL (ref 4.21–5.77)
RBC # BLD: ABNORMAL 10*6/UL
SODIUM SERPL-SCNC: 142 MMOL/L (ref 136–145)
WBC OTHER # BLD: 11.9 K/UL (ref 3.5–11.3)

## 2024-11-12 PROCEDURE — 85025 COMPLETE CBC W/AUTO DIFF WBC: CPT

## 2024-11-12 PROCEDURE — 80048 BASIC METABOLIC PNL TOTAL CA: CPT

## 2024-11-12 PROCEDURE — P9603 ONE-WAY ALLOW PRORATED MILES: HCPCS

## 2024-11-12 PROCEDURE — 36415 COLL VENOUS BLD VENIPUNCTURE: CPT

## 2025-01-29 ENCOUNTER — HOSPITAL ENCOUNTER (OUTPATIENT)
Age: 55
Setting detail: SPECIMEN
Discharge: HOME | End: 2025-01-29
Payer: COMMERCIAL

## 2025-01-29 LAB
25(OH)D3 SERPL-MCNC: 26.1 NG/ML (ref 30–100)
ANION GAP SERPL CALCULATED.3IONS-SCNC: 12 MMOL/L (ref 9–16)
BASOPHILS # BLD: 0.05 K/UL (ref 0–0.2)
BASOPHILS NFR BLD: 1 % (ref 0–2)
BILIRUB UR QL STRIP: NEGATIVE
BUN SERPL-MCNC: 38 MG/DL (ref 6–20)
CALCIUM SERPL-MCNC: 9.1 MG/DL (ref 8.6–10.4)
CASTS #/AREA URNS LPF: ABNORMAL /LPF (ref 0–2)
CASTS #/AREA URNS LPF: ABNORMAL /LPF (ref 0–2)
CHLORIDE SERPL-SCNC: 103 MMOL/L (ref 98–107)
CLARITY UR: ABNORMAL
CO2 SERPL-SCNC: 26 MMOL/L (ref 20–31)
COLOR UR: YELLOW
CREAT SERPL-MCNC: 1.9 MG/DL (ref 0.7–1.2)
CREAT UR-MCNC: 104 MG/DL (ref 39–259)
CRYSTALS URNS MICRO: ABNORMAL /HPF
CRYSTALS URNS MICRO: ABNORMAL /HPF
EOSINOPHIL # BLD: 0.27 K/UL (ref 0–0.44)
EOSINOPHILS RELATIVE PERCENT: 3 % (ref 1–4)
EPI CELLS #/AREA URNS HPF: ABNORMAL /HPF (ref 0–5)
ERYTHROCYTE [DISTWIDTH] IN BLOOD BY AUTOMATED COUNT: 13.9 % (ref 11.8–14.4)
GFR, ESTIMATED: 42 ML/MIN/1.73M2
GLUCOSE SERPL-MCNC: 95 MG/DL (ref 74–99)
GLUCOSE UR STRIP-MCNC: NEGATIVE MG/DL
HCT VFR BLD AUTO: 44.9 % (ref 40.7–50.3)
HGB BLD-MCNC: 13.6 G/DL (ref 13–17)
HGB UR QL STRIP.AUTO: ABNORMAL
IMM GRANULOCYTES # BLD AUTO: 0.05 K/UL (ref 0–0.3)
IMM GRANULOCYTES NFR BLD: 1 %
KETONES UR STRIP-MCNC: NEGATIVE MG/DL
LEUKOCYTE ESTERASE UR QL STRIP: NEGATIVE
LYMPHOCYTES NFR BLD: 1.05 K/UL (ref 1.1–3.7)
LYMPHOCYTES RELATIVE PERCENT: 12 % (ref 24–43)
MAGNESIUM SERPL-MCNC: 2.4 MG/DL (ref 1.6–2.6)
MCH RBC QN AUTO: 27.1 PG (ref 25.2–33.5)
MCHC RBC AUTO-ENTMCNC: 30.3 G/DL (ref 28.4–34.8)
MCV RBC AUTO: 89.4 FL (ref 82.6–102.9)
MONOCYTES NFR BLD: 0.57 K/UL (ref 0.1–1.2)
MONOCYTES NFR BLD: 6 % (ref 3–12)
NEUTROPHILS NFR BLD: 77 % (ref 36–65)
NEUTS SEG NFR BLD: 7.06 K/UL (ref 1.5–8.1)
NITRITE UR QL STRIP: NEGATIVE
NRBC BLD-RTO: 0 PER 100 WBC
PH UR STRIP: 5.5 [PH] (ref 5–8)
PLATELET # BLD AUTO: 232 K/UL (ref 138–453)
PMV BLD AUTO: 10.3 FL (ref 8.1–13.5)
POTASSIUM SERPL-SCNC: 3.7 MMOL/L (ref 3.7–5.3)
PROT UR STRIP-MCNC: ABNORMAL MG/DL
RBC # BLD AUTO: 5.02 M/UL (ref 4.21–5.77)
RBC #/AREA URNS HPF: ABNORMAL /HPF (ref 0–2)
SODIUM SERPL-SCNC: 141 MMOL/L (ref 136–145)
SP GR UR STRIP: 1.02 (ref 1–1.03)
TOTAL PROTEIN, URINE: 135 MG/DL
URATE SERPL-MCNC: 6.5 MG/DL (ref 3.4–7)
URINE TOTAL PROTEIN CREATININE RATIO: 1.3 (ref 0–0.2)
UROBILINOGEN UR STRIP-ACNC: NORMAL EU/DL (ref 0–1)
WBC #/AREA URNS HPF: ABNORMAL /HPF (ref 0–5)
WBC OTHER # BLD: 9.1 K/UL (ref 3.5–11.3)

## 2025-01-29 PROCEDURE — 84550 ASSAY OF BLOOD/URIC ACID: CPT

## 2025-01-29 PROCEDURE — 83735 ASSAY OF MAGNESIUM: CPT

## 2025-01-29 PROCEDURE — 84156 ASSAY OF PROTEIN URINE: CPT

## 2025-01-29 PROCEDURE — 82570 ASSAY OF URINE CREATININE: CPT

## 2025-01-29 PROCEDURE — 82306 VITAMIN D 25 HYDROXY: CPT

## 2025-01-29 PROCEDURE — 87086 URINE CULTURE/COLONY COUNT: CPT

## 2025-01-29 PROCEDURE — 80048 BASIC METABOLIC PNL TOTAL CA: CPT

## 2025-01-29 PROCEDURE — 81001 URINALYSIS AUTO W/SCOPE: CPT

## 2025-01-29 PROCEDURE — 85025 COMPLETE CBC W/AUTO DIFF WBC: CPT

## 2025-01-30 LAB
MICROORGANISM SPEC CULT: NORMAL
SPECIMEN DESCRIPTION: NORMAL